# Patient Record
Sex: FEMALE | Race: WHITE | Employment: UNEMPLOYED | URBAN - NONMETROPOLITAN AREA
[De-identification: names, ages, dates, MRNs, and addresses within clinical notes are randomized per-mention and may not be internally consistent; named-entity substitution may affect disease eponyms.]

---

## 2017-03-28 ENCOUNTER — OFFICE VISIT (OUTPATIENT)
Dept: FAMILY MEDICINE CLINIC | Age: 37
End: 2017-03-28

## 2017-03-28 VITALS
WEIGHT: 211 LBS | HEIGHT: 67 IN | SYSTOLIC BLOOD PRESSURE: 154 MMHG | DIASTOLIC BLOOD PRESSURE: 108 MMHG | BODY MASS INDEX: 33.12 KG/M2 | HEART RATE: 72 BPM

## 2017-03-28 DIAGNOSIS — S39.012A LUMBAR STRAIN, INITIAL ENCOUNTER: Primary | ICD-10-CM

## 2017-03-28 PROCEDURE — 99213 OFFICE O/P EST LOW 20 MIN: CPT | Performed by: FAMILY MEDICINE

## 2017-03-28 RX ORDER — ORPHENADRINE CITRATE 100 MG/1
100 TABLET, EXTENDED RELEASE ORAL 2 TIMES DAILY
Qty: 30 TABLET | Refills: 1 | Status: SHIPPED | OUTPATIENT
Start: 2017-03-28 | End: 2018-09-13 | Stop reason: ALTCHOICE

## 2017-03-28 RX ORDER — PREDNISONE 10 MG/1
TABLET ORAL
Qty: 30 TABLET | Refills: 0 | Status: SHIPPED | OUTPATIENT
Start: 2017-03-28 | End: 2017-10-26 | Stop reason: ALTCHOICE

## 2017-03-28 ASSESSMENT — ENCOUNTER SYMPTOMS
GASTROINTESTINAL NEGATIVE: 1
RESPIRATORY NEGATIVE: 1
SHORTNESS OF BREATH: 0
BACK PAIN: 1

## 2017-10-26 ENCOUNTER — OFFICE VISIT (OUTPATIENT)
Dept: FAMILY MEDICINE CLINIC | Age: 37
End: 2017-10-26
Payer: COMMERCIAL

## 2017-10-26 ENCOUNTER — HOSPITAL ENCOUNTER (OUTPATIENT)
Age: 37
Discharge: HOME | End: 2017-10-26
Payer: COMMERCIAL

## 2017-10-26 VITALS
WEIGHT: 190.4 LBS | SYSTOLIC BLOOD PRESSURE: 172 MMHG | HEART RATE: 92 BPM | DIASTOLIC BLOOD PRESSURE: 100 MMHG | HEIGHT: 67 IN | BODY MASS INDEX: 29.88 KG/M2

## 2017-10-26 DIAGNOSIS — R63.4 WEIGHT LOSS: Primary | ICD-10-CM

## 2017-10-26 DIAGNOSIS — R53.83 FATIGUE, UNSPECIFIED TYPE: ICD-10-CM

## 2017-10-26 DIAGNOSIS — R63.4 WEIGHT LOSS: ICD-10-CM

## 2017-10-26 DIAGNOSIS — R25.1 TREMORS OF NERVOUS SYSTEM: ICD-10-CM

## 2017-10-26 LAB
ALBUMIN SERPL-MCNC: 4.8 G/DL (ref 3.5–5.1)
ALP BLD-CCNC: 104 U/L (ref 38–126)
ALT SERPL-CCNC: 49 U/L (ref 11–66)
ANION GAP SERPL CALCULATED.3IONS-SCNC: 19 MEQ/L (ref 8–16)
AST SERPL-CCNC: 44 U/L (ref 5–40)
BASOPHILS # BLD: 0.4 %
BASOPHILS ABSOLUTE: 0 THOU/MM3 (ref 0–0.1)
BILIRUB SERPL-MCNC: 0.7 MG/DL (ref 0.3–1.2)
BUN BLDV-MCNC: 8 MG/DL (ref 7–22)
CALCIUM SERPL-MCNC: 9.8 MG/DL (ref 8.5–10.5)
CHLORIDE BLD-SCNC: 96 MEQ/L (ref 98–111)
CO2: 24 MEQ/L (ref 23–33)
CREAT SERPL-MCNC: 0.7 MG/DL (ref 0.4–1.2)
EOSINOPHIL # BLD: 1 %
EOSINOPHILS ABSOLUTE: 0.1 THOU/MM3 (ref 0–0.4)
GFR SERPL CREATININE-BSD FRML MDRD: > 90 ML/MIN/1.73M2
GLUCOSE BLD-MCNC: 90 MG/DL (ref 70–108)
HCT VFR BLD CALC: 47.3 % (ref 37–47)
HEMOGLOBIN: 16.1 GM/DL (ref 12–16)
LYMPHOCYTES # BLD: 21 %
LYMPHOCYTES ABSOLUTE: 1.9 THOU/MM3 (ref 1–4.8)
MCH RBC QN AUTO: 31.6 PG (ref 27–31)
MCHC RBC AUTO-ENTMCNC: 34 GM/DL (ref 33–37)
MCV RBC AUTO: 92.8 FL (ref 81–99)
MONOCYTES # BLD: 6 %
MONOCYTES ABSOLUTE: 0.5 THOU/MM3 (ref 0.4–1.3)
NUCLEATED RED BLOOD CELLS: 0 /100 WBC
PDW BLD-RTO: 12.6 % (ref 11.5–14.5)
PLATELET # BLD: 260 THOU/MM3 (ref 130–400)
PMV BLD AUTO: 8.6 MCM (ref 7.4–10.4)
POTASSIUM SERPL-SCNC: 4.5 MEQ/L (ref 3.5–5.2)
RBC # BLD: 5.09 MILL/MM3 (ref 4.2–5.4)
SEG NEUTROPHILS: 71.6 %
SEGMENTED NEUTROPHILS ABSOLUTE COUNT: 6.4 THOU/MM3 (ref 1.8–7.7)
SODIUM BLD-SCNC: 139 MEQ/L (ref 135–145)
T3 TOTAL: 130 NG/DL (ref 72–181)
TOTAL PROTEIN: 7.4 G/DL (ref 6.1–8)
TSH SERPL DL<=0.05 MIU/L-ACNC: 3.17 UIU/ML (ref 0.4–4.2)
WBC # BLD: 9 THOU/MM3 (ref 4.8–10.8)

## 2017-10-26 PROCEDURE — 84436 ASSAY OF TOTAL THYROXINE: CPT

## 2017-10-26 PROCEDURE — 99213 OFFICE O/P EST LOW 20 MIN: CPT | Performed by: FAMILY MEDICINE

## 2017-10-26 PROCEDURE — 80050 GENERAL HEALTH PANEL: CPT

## 2017-10-26 PROCEDURE — 36415 COLL VENOUS BLD VENIPUNCTURE: CPT

## 2017-10-26 PROCEDURE — 84480 ASSAY TRIIODOTHYRONINE (T3): CPT

## 2017-10-26 RX ORDER — ASPIRIN/CALCIUM/MAG/ALUMINUM 325 MG
1 TABLET ORAL DAILY
COMMUNITY
End: 2020-12-16

## 2017-10-26 ASSESSMENT — PATIENT HEALTH QUESTIONNAIRE - PHQ9
2. FEELING DOWN, DEPRESSED OR HOPELESS: 1
SUM OF ALL RESPONSES TO PHQ9 QUESTIONS 1 & 2: 1
1. LITTLE INTEREST OR PLEASURE IN DOING THINGS: 0
SUM OF ALL RESPONSES TO PHQ QUESTIONS 1-9: 1

## 2017-10-27 ENCOUNTER — TELEPHONE (OUTPATIENT)
Dept: FAMILY MEDICINE CLINIC | Age: 37
End: 2017-10-27

## 2017-10-27 DIAGNOSIS — R25.1 TREMORS OF NERVOUS SYSTEM: ICD-10-CM

## 2017-10-27 DIAGNOSIS — R63.4 WEIGHT LOSS: Primary | ICD-10-CM

## 2017-10-27 DIAGNOSIS — R58 ECCHYMOSIS: ICD-10-CM

## 2017-10-27 LAB — THYROXINE (T4): 6.8 UG/DL (ref 4.5–12)

## 2017-10-27 NOTE — PROGRESS NOTES
Name:  Kassidy Durán  :    1980    Chief Complaint   Patient presents with    Weight Loss    Fatigue     several weeks    Menstrual Problem     irregular periods       HPI:  Weeks of multiple symptoms. Weight loss for no reason. Tremors. Mild diarrhea. Psych is good. Menses longer, but regular. No fever. Physical Exam:      BP (!) 172/100   Pulse 92   Ht 5' 7\" (1.702 m)   Wt 190 lb 6.4 oz (86.4 kg)   LMP 10/10/2017 (Within Days)   BMI 29.82 kg/m²     Not ill. Weight down. Fine tremor. Lungs are clear. Heart in RRR with no murmur. Neck with normal thyroid. Abd is soft and non tender. Assessment/Plan:      Looks like thyroid disease. Check labs. Kelsey Jones was seen today for weight loss, fatigue and menstrual problem. Diagnoses and all orders for this visit:    Weight loss  -     CBC Auto Differential; Future  -     Comprehensive Metabolic Panel; Future  -     TSH without Reflex; Future  -     T4; Future  -     T3; Future    Tremors of nervous system  -     CBC Auto Differential; Future  -     Comprehensive Metabolic Panel; Future  -     TSH without Reflex; Future  -     T4; Future  -     T3; Future    Fatigue, unspecified type  -     CBC Auto Differential; Future  -     Comprehensive Metabolic Panel; Future  -     TSH without Reflex;  Future  -     T4; Future  -     T3; Future

## 2017-10-27 NOTE — TELEPHONE ENCOUNTER
Patient notified. I will run the orders over to the [facility]. She will stop by Monday for the results of the previous labs.

## 2017-10-30 ENCOUNTER — HOSPITAL ENCOUNTER (OUTPATIENT)
Age: 37
Discharge: HOME | End: 2017-10-30
Payer: COMMERCIAL

## 2017-10-30 DIAGNOSIS — R25.1 TREMORS OF NERVOUS SYSTEM: ICD-10-CM

## 2017-10-30 DIAGNOSIS — R63.4 WEIGHT LOSS: ICD-10-CM

## 2017-10-30 DIAGNOSIS — R58 ECCHYMOSIS: ICD-10-CM

## 2017-10-30 LAB
CORTISOL COLLECTION INFO: NORMAL
CORTISOL: 6.04 UG/DL

## 2017-10-30 PROCEDURE — 82024 ASSAY OF ACTH: CPT

## 2017-10-30 PROCEDURE — 82533 TOTAL CORTISOL: CPT

## 2017-10-30 PROCEDURE — 83525 ASSAY OF INSULIN: CPT

## 2017-10-30 PROCEDURE — 36415 COLL VENOUS BLD VENIPUNCTURE: CPT

## 2017-10-30 PROCEDURE — 82384 ASSAY THREE CATECHOLAMINES: CPT

## 2017-10-31 LAB — INSULIN, RANDOM OR FASTING: NORMAL

## 2017-11-01 LAB — ACTH: 11 PG/ML (ref 6–58)

## 2017-11-03 LAB — CATECHOLAMINES FRACT FREE PLASMA: NORMAL

## 2017-11-06 ENCOUNTER — TELEPHONE (OUTPATIENT)
Dept: FAMILY MEDICINE CLINIC | Age: 37
End: 2017-11-06

## 2017-11-06 NOTE — TELEPHONE ENCOUNTER
Spoke with patient regarding the labs done  Patient wants to wait on the Toprol and will call the office back when she decides what she would like to do for the symptoms

## 2017-11-06 NOTE — TELEPHONE ENCOUNTER
----- Message from Helena Pearce MD sent at 11/4/2017  5:45 AM EDT -----  Results do not show a clear cause for how she feels.   We could try Toprol if we did not already

## 2017-11-06 NOTE — TELEPHONE ENCOUNTER
----- Message from Debby Thornton MD sent at 11/4/2017  5:45 AM EDT -----  Results do not show a clear cause for how she feels.   We could try Toprol if we did not already

## 2018-06-21 ENCOUNTER — OFFICE VISIT (OUTPATIENT)
Dept: FAMILY MEDICINE CLINIC | Age: 38
End: 2018-06-21
Payer: COMMERCIAL

## 2018-06-21 VITALS
DIASTOLIC BLOOD PRESSURE: 86 MMHG | SYSTOLIC BLOOD PRESSURE: 138 MMHG | HEART RATE: 76 BPM | BODY MASS INDEX: 27.21 KG/M2 | WEIGHT: 173.4 LBS | HEIGHT: 67 IN

## 2018-06-21 DIAGNOSIS — K29.50 CHRONIC GASTRITIS WITHOUT BLEEDING, UNSPECIFIED GASTRITIS TYPE: ICD-10-CM

## 2018-06-21 DIAGNOSIS — I10 ESSENTIAL HYPERTENSION: Primary | Chronic | ICD-10-CM

## 2018-06-21 PROCEDURE — 99213 OFFICE O/P EST LOW 20 MIN: CPT | Performed by: FAMILY MEDICINE

## 2018-06-21 RX ORDER — LISINOPRIL 5 MG/1
5 TABLET ORAL DAILY
COMMUNITY
End: 2018-06-21 | Stop reason: SDUPTHER

## 2018-06-21 RX ORDER — PANTOPRAZOLE SODIUM 40 MG/1
40 TABLET, DELAYED RELEASE ORAL DAILY
Qty: 30 TABLET | Refills: 3 | Status: SHIPPED | OUTPATIENT
Start: 2018-06-21 | End: 2018-06-26 | Stop reason: SINTOL

## 2018-06-21 RX ORDER — LISINOPRIL 5 MG/1
5 TABLET ORAL DAILY
Qty: 90 TABLET | Refills: 1 | Status: SHIPPED | OUTPATIENT
Start: 2018-06-21 | End: 2018-09-13 | Stop reason: SDUPTHER

## 2018-06-22 ASSESSMENT — ENCOUNTER SYMPTOMS
ORTHOPNEA: 0
GASTROINTESTINAL NEGATIVE: 1
RESPIRATORY NEGATIVE: 1
SHORTNESS OF BREATH: 0

## 2018-06-26 ENCOUNTER — TELEPHONE (OUTPATIENT)
Dept: FAMILY MEDICINE CLINIC | Age: 38
End: 2018-06-26

## 2018-07-31 ENCOUNTER — TELEPHONE (OUTPATIENT)
Dept: FAMILY MEDICINE CLINIC | Age: 38
End: 2018-07-31

## 2018-07-31 NOTE — TELEPHONE ENCOUNTER
Patient states [facility] doesn't have her generic prevacid solutab that she has been rx'd. She is willing to go to another pharmacy but she is unaware who else would have. She is willing to go to [facility] or any [place] pharmacy. Please advise patient when complete. Thanks!

## 2018-07-31 NOTE — TELEPHONE ENCOUNTER
Spoke with the patient regarding the patient needing to check the pharmacy to see who has the Prevacid in stock   Patient will call back and let us know

## 2018-09-13 ENCOUNTER — OFFICE VISIT (OUTPATIENT)
Dept: FAMILY MEDICINE CLINIC | Age: 38
End: 2018-09-13
Payer: COMMERCIAL

## 2018-09-13 VITALS
DIASTOLIC BLOOD PRESSURE: 62 MMHG | HEART RATE: 92 BPM | WEIGHT: 172 LBS | SYSTOLIC BLOOD PRESSURE: 122 MMHG | BODY MASS INDEX: 27 KG/M2 | HEIGHT: 67 IN

## 2018-09-13 DIAGNOSIS — I10 ESSENTIAL HYPERTENSION: Chronic | ICD-10-CM

## 2018-09-13 PROCEDURE — 99213 OFFICE O/P EST LOW 20 MIN: CPT | Performed by: FAMILY MEDICINE

## 2018-09-13 RX ORDER — DESVENLAFAXINE 50 MG/1
50 TABLET, EXTENDED RELEASE ORAL DAILY
Qty: 90 TABLET | Refills: 0 | Status: SHIPPED | OUTPATIENT
Start: 2018-09-13 | End: 2018-12-13 | Stop reason: SDUPTHER

## 2018-09-13 RX ORDER — LISINOPRIL 5 MG/1
5 TABLET ORAL DAILY
Qty: 90 TABLET | Refills: 1 | Status: SHIPPED | OUTPATIENT
Start: 2018-09-13 | End: 2019-03-21 | Stop reason: SDUPTHER

## 2018-09-13 RX ORDER — FLUOXETINE HYDROCHLORIDE 20 MG/1
20 CAPSULE ORAL DAILY
Qty: 90 CAPSULE | Refills: 0 | Status: SHIPPED | OUTPATIENT
Start: 2018-09-13 | End: 2018-12-13 | Stop reason: SDUPTHER

## 2018-09-13 ASSESSMENT — PATIENT HEALTH QUESTIONNAIRE - PHQ9
SUM OF ALL RESPONSES TO PHQ QUESTIONS 1-9: 0
SUM OF ALL RESPONSES TO PHQ9 QUESTIONS 1 & 2: 0
1. LITTLE INTEREST OR PLEASURE IN DOING THINGS: 0
2. FEELING DOWN, DEPRESSED OR HOPELESS: 0
SUM OF ALL RESPONSES TO PHQ QUESTIONS 1-9: 0

## 2018-09-13 ASSESSMENT — ENCOUNTER SYMPTOMS
RESPIRATORY NEGATIVE: 1
ORTHOPNEA: 0
GASTROINTESTINAL NEGATIVE: 1
SHORTNESS OF BREATH: 0

## 2018-12-13 RX ORDER — FLUOXETINE HYDROCHLORIDE 20 MG/1
CAPSULE ORAL
Qty: 90 CAPSULE | Refills: 0 | Status: SHIPPED | OUTPATIENT
Start: 2018-12-13 | End: 2019-03-12 | Stop reason: SDUPTHER

## 2018-12-13 RX ORDER — DESVENLAFAXINE 50 MG/1
TABLET, EXTENDED RELEASE ORAL
Qty: 90 TABLET | Refills: 0 | Status: SHIPPED | OUTPATIENT
Start: 2018-12-13 | End: 2019-03-12 | Stop reason: SDUPTHER

## 2019-03-12 RX ORDER — FLUOXETINE HYDROCHLORIDE 20 MG/1
CAPSULE ORAL
Qty: 90 CAPSULE | Refills: 0 | Status: SHIPPED | OUTPATIENT
Start: 2019-03-12 | End: 2019-03-21 | Stop reason: SDUPTHER

## 2019-03-12 RX ORDER — DESVENLAFAXINE 50 MG/1
TABLET, EXTENDED RELEASE ORAL
Qty: 90 TABLET | Refills: 0 | Status: SHIPPED | OUTPATIENT
Start: 2019-03-12 | End: 2019-03-21 | Stop reason: SDUPTHER

## 2019-03-21 ENCOUNTER — OFFICE VISIT (OUTPATIENT)
Dept: FAMILY MEDICINE CLINIC | Age: 39
End: 2019-03-21
Payer: COMMERCIAL

## 2019-03-21 VITALS
SYSTOLIC BLOOD PRESSURE: 124 MMHG | WEIGHT: 182.8 LBS | DIASTOLIC BLOOD PRESSURE: 76 MMHG | HEART RATE: 84 BPM | HEIGHT: 67 IN | BODY MASS INDEX: 28.69 KG/M2

## 2019-03-21 DIAGNOSIS — Z00.00 WELL ADULT HEALTH CHECK: Primary | ICD-10-CM

## 2019-03-21 DIAGNOSIS — I10 ESSENTIAL HYPERTENSION: Chronic | ICD-10-CM

## 2019-03-21 PROCEDURE — 99213 OFFICE O/P EST LOW 20 MIN: CPT | Performed by: FAMILY MEDICINE

## 2019-03-21 RX ORDER — LISINOPRIL 5 MG/1
5 TABLET ORAL DAILY
Qty: 90 TABLET | Refills: 1 | Status: SHIPPED | OUTPATIENT
Start: 2019-03-21 | End: 2019-09-20 | Stop reason: SDUPTHER

## 2019-03-21 RX ORDER — DESVENLAFAXINE 50 MG/1
50 TABLET, EXTENDED RELEASE ORAL DAILY
Qty: 90 TABLET | Refills: 1 | Status: SHIPPED | OUTPATIENT
Start: 2019-03-21 | End: 2019-09-20 | Stop reason: SDUPTHER

## 2019-03-21 RX ORDER — FLUOXETINE HYDROCHLORIDE 20 MG/1
20 CAPSULE ORAL DAILY
Qty: 90 CAPSULE | Refills: 1 | Status: SHIPPED | OUTPATIENT
Start: 2019-03-21 | End: 2019-09-20 | Stop reason: SDUPTHER

## 2019-03-21 ASSESSMENT — ENCOUNTER SYMPTOMS
GASTROINTESTINAL NEGATIVE: 1
ORTHOPNEA: 0
RESPIRATORY NEGATIVE: 1

## 2019-03-27 ENCOUNTER — TELEPHONE (OUTPATIENT)
Dept: FAMILY MEDICINE CLINIC | Age: 39
End: 2019-03-27

## 2019-03-27 DIAGNOSIS — F43.0 ACUTE REACTION TO SITUATIONAL STRESS: Primary | ICD-10-CM

## 2019-03-28 RX ORDER — ALPRAZOLAM 0.5 MG/1
0.5 TABLET ORAL 3 TIMES DAILY PRN
Qty: 20 TABLET | Refills: 0 | Status: SHIPPED | OUTPATIENT
Start: 2019-03-28 | End: 2019-07-23 | Stop reason: SDUPTHER

## 2019-05-20 ENCOUNTER — OFFICE VISIT (OUTPATIENT)
Dept: FAMILY MEDICINE CLINIC | Age: 39
End: 2019-05-20
Payer: COMMERCIAL

## 2019-05-20 ENCOUNTER — HOSPITAL ENCOUNTER (OUTPATIENT)
Dept: GENERAL RADIOLOGY | Age: 39
Discharge: HOME | End: 2019-05-20
Payer: COMMERCIAL

## 2019-05-20 VITALS
HEIGHT: 67 IN | SYSTOLIC BLOOD PRESSURE: 144 MMHG | DIASTOLIC BLOOD PRESSURE: 74 MMHG | HEART RATE: 80 BPM | WEIGHT: 186.6 LBS | BODY MASS INDEX: 29.29 KG/M2

## 2019-05-20 DIAGNOSIS — R55 SYNCOPE, UNSPECIFIED SYNCOPE TYPE: ICD-10-CM

## 2019-05-20 DIAGNOSIS — R55 SYNCOPE, UNSPECIFIED SYNCOPE TYPE: Primary | ICD-10-CM

## 2019-05-20 PROCEDURE — 93226 XTRNL ECG REC<48 HR SCAN A/R: CPT

## 2019-05-20 PROCEDURE — 99213 OFFICE O/P EST LOW 20 MIN: CPT | Performed by: FAMILY MEDICINE

## 2019-05-20 RX ORDER — HYDROXYZINE PAMOATE 25 MG/1
25 CAPSULE ORAL 3 TIMES DAILY PRN
COMMUNITY
End: 2019-05-20 | Stop reason: SDUPTHER

## 2019-05-20 RX ORDER — HYDROXYZINE PAMOATE 25 MG/1
25 CAPSULE ORAL 3 TIMES DAILY PRN
Qty: 100 CAPSULE | Refills: 3 | Status: SHIPPED | OUTPATIENT
Start: 2019-05-20 | End: 2019-09-16 | Stop reason: CLARIF

## 2019-05-20 RX ORDER — CEPHALEXIN 500 MG/1
1 CAPSULE ORAL EVERY 6 HOURS
Refills: 0 | COMMUNITY
Start: 2019-05-11 | End: 2019-07-01 | Stop reason: ALTCHOICE

## 2019-05-20 RX ORDER — IBUPROFEN 600 MG/1
600 TABLET ORAL
COMMUNITY
Start: 2019-01-30 | End: 2019-09-03 | Stop reason: ALTCHOICE

## 2019-05-25 LAB
ACQUISITION DURATION: NORMAL S
AVERAGE HEART RATE: 89 BPM
HOOKUP DATE: NORMAL
HOOKUP TIME: NORMAL
MAX HEART RATE TIME/DATE: NORMAL
MAX HEART RATE: 146 BPM
MIN HEART RATE TIME/DATE: NORMAL
MIN HEART RATE: 64 BPM
NUMBER OF QRS COMPLEXES: NORMAL
NUMBER OF SUPRAVENTRICULAR BEATS IN RUNS: 0
NUMBER OF SUPRAVENTRICULAR COUPLETS: 0
NUMBER OF SUPRAVENTRICULAR ECTOPICS: 0
NUMBER OF SUPRAVENTRICULAR ISOLATED BEATS: 0
NUMBER OF SUPRAVENTRICULAR RUNS: 0
NUMBER OF VENTRICULAR BEATS IN RUNS: 0
NUMBER OF VENTRICULAR BIGEMINAL CYCLES: 0
NUMBER OF VENTRICULAR COUPLETS: 0
NUMBER OF VENTRICULAR ECTOPICS: 3
NUMBER OF VENTRICULAR ISOLATED BEATS: 3
NUMBER OF VENTRICULAR RUNS: 0

## 2019-05-28 ENCOUNTER — TELEPHONE (OUTPATIENT)
Dept: FAMILY MEDICINE CLINIC | Age: 39
End: 2019-05-28

## 2019-06-10 DIAGNOSIS — R55 SYNCOPE, UNSPECIFIED SYNCOPE TYPE: ICD-10-CM

## 2019-06-13 ENCOUNTER — TELEPHONE (OUTPATIENT)
Dept: FAMILY MEDICINE CLINIC | Age: 39
End: 2019-06-13

## 2019-06-13 NOTE — TELEPHONE ENCOUNTER
Patient called in for the EEG results (done at [facility]). These were given and  Now patient wants to know what next. She says no more syncopal episodes. We are to call her back.

## 2019-07-01 ENCOUNTER — OFFICE VISIT (OUTPATIENT)
Dept: FAMILY MEDICINE CLINIC | Age: 39
End: 2019-07-01
Payer: COMMERCIAL

## 2019-07-01 VITALS
HEART RATE: 84 BPM | DIASTOLIC BLOOD PRESSURE: 86 MMHG | BODY MASS INDEX: 29.35 KG/M2 | SYSTOLIC BLOOD PRESSURE: 134 MMHG | HEIGHT: 67 IN | WEIGHT: 187 LBS

## 2019-07-01 DIAGNOSIS — R55 NEAR SYNCOPE: Primary | ICD-10-CM

## 2019-07-01 PROCEDURE — 99213 OFFICE O/P EST LOW 20 MIN: CPT | Performed by: FAMILY MEDICINE

## 2019-07-05 ENCOUNTER — HOSPITAL ENCOUNTER (OUTPATIENT)
Dept: NON INVASIVE DIAGNOSTICS | Age: 39
Discharge: HOME | End: 2019-07-05
Payer: COMMERCIAL

## 2019-07-05 DIAGNOSIS — R55 NEAR SYNCOPE: ICD-10-CM

## 2019-07-05 LAB
LV EF: 63 %
LVEF MODALITY: NORMAL

## 2019-07-05 PROCEDURE — 93306 TTE W/DOPPLER COMPLETE: CPT

## 2019-07-08 ENCOUNTER — TELEPHONE (OUTPATIENT)
Dept: FAMILY MEDICINE CLINIC | Age: 39
End: 2019-07-08

## 2019-07-11 ENCOUNTER — HOSPITAL ENCOUNTER (OUTPATIENT)
Age: 39
Discharge: HOME | End: 2019-07-11
Payer: COMMERCIAL

## 2019-07-11 DIAGNOSIS — R55 NEAR SYNCOPE: ICD-10-CM

## 2019-07-11 DIAGNOSIS — Z00.00 WELL ADULT HEALTH CHECK: ICD-10-CM

## 2019-07-11 LAB
ALBUMIN SERPL-MCNC: 4.3 G/DL (ref 3.5–5.1)
ALP BLD-CCNC: 77 U/L (ref 38–126)
ALT SERPL-CCNC: 20 U/L (ref 11–66)
ANION GAP SERPL CALCULATED.3IONS-SCNC: 16 MEQ/L (ref 8–16)
AST SERPL-CCNC: 30 U/L (ref 5–40)
BILIRUB SERPL-MCNC: 0.2 MG/DL (ref 0.3–1.2)
BUN BLDV-MCNC: 9 MG/DL (ref 7–22)
CALCIUM SERPL-MCNC: 9 MG/DL (ref 8.5–10.5)
CHLORIDE BLD-SCNC: 100 MEQ/L (ref 98–111)
CHOLESTEROL, TOTAL: 229 MG/DL (ref 100–199)
CO2: 22 MEQ/L (ref 23–33)
CREAT SERPL-MCNC: 0.5 MG/DL (ref 0.4–1.2)
GFR SERPL CREATININE-BSD FRML MDRD: > 90 ML/MIN/1.73M2
GLUCOSE BLD-MCNC: 80 MG/DL (ref 70–108)
HDLC SERPL-MCNC: 52 MG/DL
LDL CHOLESTEROL CALCULATED: ABNORMAL MG/DL
POTASSIUM SERPL-SCNC: 4.1 MEQ/L (ref 3.5–5.2)
SODIUM BLD-SCNC: 138 MEQ/L (ref 135–145)
TOTAL PROTEIN: 6.6 G/DL (ref 6.1–8)
TRIGL SERPL-MCNC: 731 MG/DL (ref 0–199)
TSH SERPL DL<=0.05 MIU/L-ACNC: 2.37 UIU/ML (ref 0.4–4.2)

## 2019-07-11 PROCEDURE — 80053 COMPREHEN METABOLIC PANEL: CPT

## 2019-07-11 PROCEDURE — 36415 COLL VENOUS BLD VENIPUNCTURE: CPT

## 2019-07-11 PROCEDURE — 84443 ASSAY THYROID STIM HORMONE: CPT

## 2019-07-11 PROCEDURE — 80061 LIPID PANEL: CPT

## 2019-07-15 ENCOUNTER — TELEPHONE (OUTPATIENT)
Dept: FAMILY MEDICINE CLINIC | Age: 39
End: 2019-07-15

## 2019-07-23 ENCOUNTER — TELEPHONE (OUTPATIENT)
Dept: FAMILY MEDICINE CLINIC | Age: 39
End: 2019-07-23

## 2019-07-23 DIAGNOSIS — F43.0 ACUTE REACTION TO SITUATIONAL STRESS: ICD-10-CM

## 2019-07-23 RX ORDER — ALPRAZOLAM 0.5 MG/1
0.5 TABLET ORAL 3 TIMES DAILY PRN
Qty: 20 TABLET | Refills: 0 | Status: SHIPPED | OUTPATIENT
Start: 2019-07-23 | End: 2019-10-31 | Stop reason: SDUPTHER

## 2019-08-27 LAB
ALBUMIN SERPL-MCNC: NORMAL G/DL
ALP BLD-CCNC: NORMAL U/L
ALT SERPL-CCNC: NORMAL U/L
ANION GAP SERPL CALCULATED.3IONS-SCNC: NORMAL MMOL/L
AST SERPL-CCNC: NORMAL U/L
BASOPHILS ABSOLUTE: NORMAL /ΜL
BASOPHILS RELATIVE PERCENT: NORMAL %
BILIRUB SERPL-MCNC: NORMAL MG/DL (ref 0.1–1.4)
BUN BLDV-MCNC: NORMAL MG/DL
CALCIUM SERPL-MCNC: NORMAL MG/DL
CHLORIDE BLD-SCNC: NORMAL MMOL/L
CO2: NORMAL MMOL/L
CREAT SERPL-MCNC: NORMAL MG/DL
EOSINOPHILS ABSOLUTE: NORMAL /ΜL
EOSINOPHILS RELATIVE PERCENT: NORMAL %
GFR CALCULATED: NORMAL
GLUCOSE BLD-MCNC: NORMAL MG/DL
HCT VFR BLD CALC: NORMAL % (ref 36–46)
HEMOGLOBIN: NORMAL G/DL (ref 12–16)
LYMPHOCYTES ABSOLUTE: NORMAL /ΜL
LYMPHOCYTES RELATIVE PERCENT: NORMAL %
MCH RBC QN AUTO: NORMAL PG
MCHC RBC AUTO-ENTMCNC: NORMAL G/DL
MCV RBC AUTO: NORMAL FL
MONOCYTES ABSOLUTE: NORMAL /ΜL
MONOCYTES RELATIVE PERCENT: NORMAL %
NEUTROPHILS ABSOLUTE: NORMAL /ΜL
NEUTROPHILS RELATIVE PERCENT: NORMAL %
PDW BLD-RTO: NORMAL %
PLATELET # BLD: NORMAL K/ΜL
PMV BLD AUTO: NORMAL FL
POTASSIUM SERPL-SCNC: NORMAL MMOL/L
RBC # BLD: NORMAL 10^6/ΜL
SODIUM BLD-SCNC: NORMAL MMOL/L
TOTAL PROTEIN: NORMAL
WBC # BLD: NORMAL 10^3/ML

## 2019-09-03 ENCOUNTER — OFFICE VISIT (OUTPATIENT)
Dept: FAMILY MEDICINE CLINIC | Age: 39
End: 2019-09-03
Payer: COMMERCIAL

## 2019-09-03 VITALS
BODY MASS INDEX: 29.35 KG/M2 | SYSTOLIC BLOOD PRESSURE: 122 MMHG | HEIGHT: 67 IN | HEART RATE: 88 BPM | WEIGHT: 187 LBS | DIASTOLIC BLOOD PRESSURE: 82 MMHG

## 2019-09-03 DIAGNOSIS — R74.8 ELEVATED LIVER ENZYMES: Primary | ICD-10-CM

## 2019-09-03 PROCEDURE — 99213 OFFICE O/P EST LOW 20 MIN: CPT | Performed by: FAMILY MEDICINE

## 2019-09-04 NOTE — PROGRESS NOTES
Name:  Radha Kincaid  :    1980    Chief Complaint   Patient presents with    Other     [facility] ER 19-go over test results       HPI:  [facility] was to [facility] [place] [facility] after feeling very weak and dizzy. Sweats and chills. She admits to drinking about a 1/2 gal of 190 proof alcohol daily. She has not tried to quit and is not sure about withdrawal.  Treated for dehydration after lab testing done. Has bee well since. Physical Exam:      /82 (Site: Left Upper Arm, Position: Sitting, Cuff Size: Medium Adult)   Pulse 88   Ht 5' 7\" (1.702 m)   Wt 187 lb (84.8 kg)   BMI 29.29 kg/m²     Not ill. Review of testing shows very mild LFT elevation at about 2-3 times normal.  Psych is good without depression or anxiety. Neuro normal.  Neck normal.   Lungs are clear. Heart in RRR with no murmur. No edema. Assessment/Plan:      Elevated LFT  Alcoholism    We will repeat labs and also get a BAL. Ar[place]us Ring We talked about quitting drinking and she is not interested. Worried about withdrawal.  Strong FHx of Alcoholism including death related. [facility] was seen today for other. Diagnoses and all orders for this visit:    Elevated liver enzymes  -     Lipid Panel; Future  -     Hepatic Function Panel; Future  -     Ethanol;  Future

## 2019-09-16 DIAGNOSIS — F41.9 ANXIETY: Primary | ICD-10-CM

## 2019-09-16 RX ORDER — HYDROXYZINE HYDROCHLORIDE 25 MG/1
25 TABLET, FILM COATED ORAL 3 TIMES DAILY PRN
COMMUNITY
End: 2019-09-16 | Stop reason: SDUPTHER

## 2019-09-16 RX ORDER — HYDROXYZINE HYDROCHLORIDE 25 MG/1
25 TABLET, FILM COATED ORAL 3 TIMES DAILY PRN
Qty: 100 TABLET | Refills: 3 | Status: SHIPPED | OUTPATIENT
Start: 2019-09-16 | End: 2019-10-31 | Stop reason: SDUPTHER

## 2019-09-16 RX ORDER — HYDROXYZINE HYDROCHLORIDE 25 MG/1
25 TABLET, FILM COATED ORAL 3 TIMES DAILY PRN
Qty: 42 TABLET | Refills: 0 | Status: SHIPPED | OUTPATIENT
Start: 2019-09-16 | End: 2019-09-20 | Stop reason: ALTCHOICE

## 2019-09-17 ENCOUNTER — TELEPHONE (OUTPATIENT)
Dept: FAMILY MEDICINE CLINIC | Age: 39
End: 2019-09-17

## 2019-09-17 ENCOUNTER — HOSPITAL ENCOUNTER (OUTPATIENT)
Age: 39
Discharge: HOME | End: 2019-09-17
Payer: COMMERCIAL

## 2019-09-17 DIAGNOSIS — R74.8 ELEVATED LIVER ENZYMES: ICD-10-CM

## 2019-09-17 LAB
ALBUMIN SERPL-MCNC: 4.6 G/DL (ref 3.5–5.1)
ALP BLD-CCNC: 93 U/L (ref 38–126)
ALT SERPL-CCNC: 43 U/L (ref 11–66)
AST SERPL-CCNC: 54 U/L (ref 5–40)
BILIRUB SERPL-MCNC: 0.5 MG/DL (ref 0.3–1.2)
BILIRUBIN DIRECT: < 0.2 MG/DL (ref 0–0.3)
CHOLESTEROL, TOTAL: 209 MG/DL (ref 100–199)
ETHYL ALCOHOL, SERUM: < 0.01 %
HDLC SERPL-MCNC: 65 MG/DL
LDL CHOLESTEROL CALCULATED: 91 MG/DL
TOTAL PROTEIN: 7.1 G/DL (ref 6.1–8)
TRIGL SERPL-MCNC: 267 MG/DL (ref 0–199)

## 2019-09-17 PROCEDURE — 36415 COLL VENOUS BLD VENIPUNCTURE: CPT

## 2019-09-17 PROCEDURE — G0480 DRUG TEST DEF 1-7 CLASSES: HCPCS

## 2019-09-17 PROCEDURE — 80076 HEPATIC FUNCTION PANEL: CPT

## 2019-09-17 PROCEDURE — 80061 LIPID PANEL: CPT

## 2019-09-17 NOTE — TELEPHONE ENCOUNTER
From yesterday's note:  Second: She states that she has been having a Menstrual spotting on and off since August 24th. She is having labs done for you on Friday and wonders if she should have other labs drawn with this. .    We are to call her back      I think she had her labs drawn already today, so I guess this is not needed.

## 2019-09-18 ENCOUNTER — TELEPHONE (OUTPATIENT)
Dept: FAMILY MEDICINE CLINIC | Age: 39
End: 2019-09-18

## 2019-09-18 NOTE — TELEPHONE ENCOUNTER
----- Message from Edson Sheriff MD sent at 9/18/2019  6:09 AM EDT -----  Alcohol level is zero. Liver testing almost normal now.

## 2019-09-20 ENCOUNTER — OFFICE VISIT (OUTPATIENT)
Dept: FAMILY MEDICINE CLINIC | Age: 39
End: 2019-09-20
Payer: COMMERCIAL

## 2019-09-20 VITALS
WEIGHT: 189.2 LBS | DIASTOLIC BLOOD PRESSURE: 78 MMHG | HEIGHT: 67 IN | BODY MASS INDEX: 29.7 KG/M2 | SYSTOLIC BLOOD PRESSURE: 122 MMHG | HEART RATE: 80 BPM

## 2019-09-20 DIAGNOSIS — I10 ESSENTIAL HYPERTENSION: Primary | Chronic | ICD-10-CM

## 2019-09-20 DIAGNOSIS — N92.0 MENORRHAGIA WITH REGULAR CYCLE: ICD-10-CM

## 2019-09-20 PROCEDURE — 99213 OFFICE O/P EST LOW 20 MIN: CPT | Performed by: FAMILY MEDICINE

## 2019-09-20 RX ORDER — LISINOPRIL 5 MG/1
5 TABLET ORAL DAILY
Qty: 90 TABLET | Refills: 1 | Status: SHIPPED | OUTPATIENT
Start: 2019-09-20 | End: 2019-10-31 | Stop reason: DRUGHIGH

## 2019-09-20 RX ORDER — MEDROXYPROGESTERONE ACETATE 10 MG/1
10 TABLET ORAL DAILY
Qty: 10 TABLET | Refills: 0 | Status: SHIPPED | OUTPATIENT
Start: 2019-09-20 | End: 2019-10-01 | Stop reason: ALTCHOICE

## 2019-09-20 RX ORDER — DESVENLAFAXINE 50 MG/1
50 TABLET, EXTENDED RELEASE ORAL DAILY
Qty: 90 TABLET | Refills: 1 | Status: SHIPPED | OUTPATIENT
Start: 2019-09-20 | End: 2020-02-10 | Stop reason: SDUPTHER

## 2019-09-20 RX ORDER — FLUOXETINE HYDROCHLORIDE 20 MG/1
20 CAPSULE ORAL DAILY
Qty: 90 CAPSULE | Refills: 1 | Status: SHIPPED | OUTPATIENT
Start: 2019-09-20 | End: 2020-03-17 | Stop reason: SDUPTHER

## 2019-09-20 ASSESSMENT — ENCOUNTER SYMPTOMS
ORTHOPNEA: 0
GASTROINTESTINAL NEGATIVE: 1
RESPIRATORY NEGATIVE: 1

## 2019-09-20 NOTE — PROGRESS NOTES
[facility]ESSION[facility]  [facility]  [address] [facility]  Dept: [phone]  Dept Fax: [phone]: [phone]    Visit Date: 9/20/2019    Kostas Maldonado is a 44 y. o.female who presents today for:   Chief Complaint   Patient presents with    6 Month Follow-Up     Recent labs    HPI:      Annalisa bray her labs done after not drinking for over 12 hours. No withdrawal.  ETOH was zero. Liver enzymes and triglycerides better. Psych meds have been good. This menstrual cycle has been very heavy and lasting long. UCG was negative at ER visit. No dizziness or weakness. Hypertension   This is a chronic problem. The current episode started more than 1 year ago. The problem has been resolved since onset. The problem is controlled. Pertinent negatives include no chest pain, orthopnea or palpitations. Risk factors for coronary artery disease include family history and stress. Past treatments include ACE inhibitors. The current treatment provides significant improvement. Compliance problems include psychosocial issues. There is no history of kidney disease, CAD/MI or CVA. There is no history of chronic renal disease or a thyroid problem.        Current Outpatient Medications   Medication Sig Dispense Refill    medroxyPROGESTERone (PROVERA) 10 MG tablet Take 1 tablet by mouth daily for 10 days 10 tablet 0    lansoprazole (PREVACID SOLUTAB) 30 MG disintegrating tablet Take 1 tablet by mouth daily 90 tablet 1    desvenlafaxine succinate (PRISTIQ) 50 MG TB24 extended release tablet Take 1 tablet by mouth daily 90 tablet 1    FLUoxetine (PROZAC) 20 MG capsule Take 1 capsule by mouth daily 90 capsule 1    lisinopril (PRINIVIL;ZESTRIL) 5 MG tablet Take 1 tablet by mouth daily 90 tablet 1    hydrOXYzine (ATARAX) 25 MG tablet Take 1 tablet by mouth 3 times daily as needed for Anxiety 100 tablet 3    Cetirizine HCl (ZYRTEC PO) Take 1 tablet by mouth

## 2019-09-30 ENCOUNTER — TELEPHONE (OUTPATIENT)
Dept: FAMILY MEDICINE CLINIC | Age: 39
End: 2019-09-30

## 2019-09-30 RX ORDER — NORGESTIMATE AND ETHINYL ESTRADIOL 0.25-0.035
KIT ORAL
Qty: 1 PACKET | Refills: 0 | Status: CANCELLED | OUTPATIENT
Start: 2019-09-30

## 2019-09-30 NOTE — TELEPHONE ENCOUNTER
We could try stopping the Provera and use Birth   Control pill and take 2 a day. Pended.    Pharmacy pending

## 2019-09-30 NOTE — TELEPHONE ENCOUNTER
Spoke with Glenda Shi- she is currently in the ER and states that they are going to refer her to GYN.

## 2019-10-01 ENCOUNTER — TELEPHONE (OUTPATIENT)
Dept: FAMILY MEDICINE CLINIC | Age: 39
End: 2019-10-01

## 2019-10-01 RX ORDER — NORGESTIMATE AND ETHINYL ESTRADIOL 0.25-0.035
1 KIT ORAL 2 TIMES DAILY
Qty: 1 PACKET | Refills: 0 | Status: SHIPPED | OUTPATIENT
Start: 2019-10-01 | End: 2020-12-16

## 2019-10-31 ENCOUNTER — OFFICE VISIT (OUTPATIENT)
Dept: FAMILY MEDICINE CLINIC | Age: 39
End: 2019-10-31
Payer: COMMERCIAL

## 2019-10-31 VITALS — SYSTOLIC BLOOD PRESSURE: 154 MMHG | WEIGHT: 188.2 LBS | DIASTOLIC BLOOD PRESSURE: 90 MMHG | BODY MASS INDEX: 29.48 KG/M2

## 2019-10-31 DIAGNOSIS — F43.0 ACUTE REACTION TO SITUATIONAL STRESS: ICD-10-CM

## 2019-10-31 DIAGNOSIS — I10 ESSENTIAL HYPERTENSION: Primary | ICD-10-CM

## 2019-10-31 DIAGNOSIS — F41.9 ANXIETY: ICD-10-CM

## 2019-10-31 PROCEDURE — 99213 OFFICE O/P EST LOW 20 MIN: CPT | Performed by: FAMILY MEDICINE

## 2019-10-31 RX ORDER — LISINOPRIL AND HYDROCHLOROTHIAZIDE 12.5; 1 MG/1; MG/1
1 TABLET ORAL DAILY
Qty: 90 TABLET | Refills: 1 | Status: SHIPPED | OUTPATIENT
Start: 2019-10-31 | End: 2020-02-25 | Stop reason: SDUPTHER

## 2019-10-31 RX ORDER — ALPRAZOLAM 0.5 MG/1
1 TABLET ORAL 3 TIMES DAILY PRN
COMMUNITY
Start: 2019-07-23 | End: 2020-10-16 | Stop reason: SDUPTHER

## 2019-10-31 RX ORDER — HYDROXYZINE HYDROCHLORIDE 25 MG/1
25 TABLET, FILM COATED ORAL 3 TIMES DAILY PRN
Qty: 100 TABLET | Refills: 3 | Status: SHIPPED | OUTPATIENT
Start: 2019-10-31 | End: 2020-02-04 | Stop reason: SDUPTHER

## 2019-10-31 RX ORDER — ALPRAZOLAM 0.5 MG/1
0.5 TABLET ORAL 3 TIMES DAILY PRN
Qty: 20 TABLET | Refills: 0 | Status: SHIPPED | OUTPATIENT
Start: 2019-10-31 | End: 2020-02-04 | Stop reason: SDUPTHER

## 2019-11-05 ENCOUNTER — TELEPHONE (OUTPATIENT)
Dept: FAMILY MEDICINE CLINIC | Age: 39
End: 2019-11-05

## 2020-02-04 ENCOUNTER — OFFICE VISIT (OUTPATIENT)
Dept: FAMILY MEDICINE CLINIC | Age: 40
End: 2020-02-04
Payer: COMMERCIAL

## 2020-02-04 VITALS
HEIGHT: 67 IN | DIASTOLIC BLOOD PRESSURE: 82 MMHG | WEIGHT: 190.4 LBS | HEART RATE: 82 BPM | SYSTOLIC BLOOD PRESSURE: 126 MMHG | BODY MASS INDEX: 29.88 KG/M2

## 2020-02-04 PROCEDURE — 99213 OFFICE O/P EST LOW 20 MIN: CPT | Performed by: FAMILY MEDICINE

## 2020-02-04 RX ORDER — HYDROXYZINE HYDROCHLORIDE 25 MG/1
25 TABLET, FILM COATED ORAL 4 TIMES DAILY PRN
Qty: 360 TABLET | Refills: 3 | Status: SHIPPED | OUTPATIENT
Start: 2020-02-04 | End: 2021-03-17 | Stop reason: SDUPTHER

## 2020-02-04 RX ORDER — ALPRAZOLAM 0.5 MG/1
0.5 TABLET ORAL 3 TIMES DAILY PRN
Qty: 20 TABLET | Refills: 0 | Status: SHIPPED | OUTPATIENT
Start: 2020-02-04 | End: 2020-03-05

## 2020-02-05 NOTE — PROGRESS NOTES
Name:  Leona German  :    1980    Chief Complaint   Patient presents with    Anxiety    Depression       HPI:  Isidro Velazquez continues to struggle with psych issues. Drinking 4 shots of 190 proof liquor nightly. No clear withdrawal.  More anxiety and panic. Not leaving house much. Previously unhappy with psychiatric care. More periods of depression and crying. No manic episodes. Very anxious about any change of meds. Physical Exam:      /82 (Site: Left Upper Arm, Position: Sitting, Cuff Size: Large Adult)   Pulse 82   Ht 5' 7\" (1.702 m)   Wt 190 lb 6.4 oz (86.4 kg)   BMI 29.82 kg/m²     Tearful. Interactive and logical.  Thyroid is normal.   Lungs are clear. Heart in RRR with no murmur. Tremors as usual.  Ambulatory. Assessment/Plan:      Unclear if some issue may be withdrawal.  Depression and anxiety and panic. We ill increase Pristiq to 100mg  Refill rescue Xanax with education for use. Isidro Velazquez was seen today for anxiety and depression. Diagnoses and all orders for this visit:    Acute reaction to situational stress  -     ALPRAZolam (XANAX) 0.5 MG tablet; Take 1 tablet by mouth 3 times daily as needed for Sleep or Anxiety for up to 30 days. Anxiety  -     hydrOXYzine (ATARAX) 25 MG tablet;  Take 1 tablet by mouth 4 times daily as needed for Anxiety

## 2020-02-10 RX ORDER — DESVENLAFAXINE 100 MG/1
100 TABLET, EXTENDED RELEASE ORAL DAILY
Qty: 90 TABLET | Refills: 3 | Status: SHIPPED | OUTPATIENT
Start: 2020-02-10 | End: 2020-10-16 | Stop reason: SDUPTHER

## 2020-02-10 NOTE — TELEPHONE ENCOUNTER
Rachid Barrios called and says she only has 6 day worth of Pristiq 50 mg ER since it was upped to 100mg. She says it is helping some what. . [facility]     Pili Harrison called requesting a refill on the following medications:  Requested Prescriptions     Pending Prescriptions Disp Refills    desvenlafaxine succinate (PRISTIQ) 50 MG TB24 extended release tablet 90 tablet 1     Sig: Take 1 tablet by mouth daily       Date of last visit: 2/4/2020  Date of next visit (if applicable):Visit date not found  Date of last refill: 000  [facility]      Thanks,  Rogerio Corado LPN

## 2020-02-25 RX ORDER — LISINOPRIL AND HYDROCHLOROTHIAZIDE 12.5; 1 MG/1; MG/1
1 TABLET ORAL DAILY
Qty: 90 TABLET | Refills: 0 | Status: SHIPPED | OUTPATIENT
Start: 2020-02-25 | End: 2020-02-26 | Stop reason: SDUPTHER

## 2020-02-25 NOTE — TELEPHONE ENCOUNTER
Buck Alvarez called requesting a refill on the following medications:  Requested Prescriptions     Pending Prescriptions Disp Refills    lisinopril-hydroCHLOROthiazide (PRINZIDE;ZESTORETIC) 10-12.5 MG per tablet 90 tablet 1     Sig: Take 1 tablet by mouth daily       Date of last visit: 2/4/2020  Date of next visit (if applicable):3/19/2020  Date of last refill: #90 x 1 on 10/31/2019  Pharmacy Name: [facility],  Willie Guidry RN

## 2020-02-26 RX ORDER — LISINOPRIL AND HYDROCHLOROTHIAZIDE 12.5; 1 MG/1; MG/1
1 TABLET ORAL DAILY
Qty: 90 TABLET | Refills: 0 | Status: SHIPPED | OUTPATIENT
Start: 2020-02-26 | End: 2020-08-10 | Stop reason: SDUPTHER

## 2020-02-26 NOTE — TELEPHONE ENCOUNTER
Raeford Brittle called requesting a refill on the following medications:  Requested Prescriptions     Pending Prescriptions Disp Refills    lisinopril-hydroCHLOROthiazide (PRINZIDE;ZESTORETIC) 10-12.5 MG per tablet 90 tablet 0     Sig: Take 1 tablet by mouth daily       Date of last visit: 2/4/2020  Date of next visit (if applicable):3/19/2020  Date of last refill: 10/13/2019  Pharmacy Name: [facility]    This was sent to [facility] yesterday by mistake [email or web address]  [facility] called to cancel      Thanks,  Matty Heads, CMA

## 2020-03-17 RX ORDER — FLUOXETINE HYDROCHLORIDE 20 MG/1
20 CAPSULE ORAL DAILY
Qty: 90 CAPSULE | Refills: 3 | Status: SHIPPED | OUTPATIENT
Start: 2020-03-17 | End: 2020-07-20 | Stop reason: SDUPTHER

## 2020-03-18 RX ORDER — LANSOPRAZOLE 30 MG/1
TABLET, ORALLY DISINTEGRATING, DELAYED RELEASE ORAL
Qty: 90 TABLET | Refills: 3 | Status: SHIPPED | OUTPATIENT
Start: 2020-03-18 | End: 2020-11-24

## 2020-07-20 RX ORDER — FLUOXETINE HYDROCHLORIDE 20 MG/1
20 CAPSULE ORAL DAILY
Qty: 90 CAPSULE | Refills: 0 | Status: SHIPPED | OUTPATIENT
Start: 2020-07-20 | End: 2020-10-16 | Stop reason: SDUPTHER

## 2020-07-20 NOTE — TELEPHONE ENCOUNTER
Adam Lezama called requesting a refill on the following medications:  Requested Prescriptions     Pending Prescriptions Disp Refills    FLUoxetine (PROZAC) 20 MG capsule 90 capsule 3     Sig: Take 1 capsule by mouth daily     Pharmacy [facility]  . [place]    PT NEEDS THIS TO GO TO [facility] FOR THE MAIL DELIVERY [facility]Y    Date of last visit: 2/4/2020  Date of next visit (if applicable): Visit date not found

## 2020-08-10 RX ORDER — LISINOPRIL AND HYDROCHLOROTHIAZIDE 12.5; 1 MG/1; MG/1
1 TABLET ORAL DAILY
Qty: 90 TABLET | Refills: 0 | Status: SHIPPED | OUTPATIENT
Start: 2020-08-10 | End: 2020-08-21 | Stop reason: SDUPTHER

## 2020-08-10 NOTE — TELEPHONE ENCOUNTER
Nohemy Duran called requesting a refill on the following medications:  Requested Prescriptions     Pending Prescriptions Disp Refills    lisinopril-hydroCHLOROthiazide (PRINZIDE;ZESTORETIC) 10-12.5 MG per tablet 90 tablet 0     Sig: Take 1 tablet by mouth daily     [facility]  +++++ during this call, I recommended an appt to establish PCP. Hattie asked if Dr Chip Villatoro does addiction type services like Dr Gallardo did. She wanted to wait until she had an answer before scheduling a new PCP.  Please call and talk to her/schedule appt      Date of last visit: 2/4/20 with Dr Felisa Stewart  Date of next visit (if applicable): Visit date not found

## 2020-08-10 NOTE — TELEPHONE ENCOUNTER
Zoya Wade called requesting a refill on the following medications:  Requested Prescriptions     Pending Prescriptions Disp Refills    lisinopril-hydroCHLOROthiazide (PRINZIDE;ZESTORETIC) 10-12.5 MG per tablet 90 tablet 0     Sig: Take 1 tablet by mouth daily       Date of last visit: 2/4/2020  Date of next visit (if applicable):N/A  Date of last refill: 02/26/2020  Pharmacy Name: [address], [address] [place] [address]

## 2020-08-11 NOTE — TELEPHONE ENCOUNTER
Needs appt with me before script sent runs out. Please arrange that with patient. No future appointments.

## 2020-08-21 RX ORDER — LISINOPRIL AND HYDROCHLOROTHIAZIDE 12.5; 1 MG/1; MG/1
1 TABLET ORAL DAILY
Qty: 90 TABLET | Refills: 0 | Status: SHIPPED | OUTPATIENT
Start: 2020-08-21 | End: 2020-10-16 | Stop reason: SDUPTHER

## 2020-08-21 RX ORDER — LISINOPRIL AND HYDROCHLOROTHIAZIDE 12.5; 1 MG/1; MG/1
1 TABLET ORAL DAILY
Qty: 30 TABLET | Refills: 0 | Status: SHIPPED | OUTPATIENT
Start: 2020-08-21 | End: 2020-10-16

## 2020-08-21 NOTE — TELEPHONE ENCOUNTER
[facility] phoned- states that they shipped patient's Lisinopril-HCTZ Rx and showed that it was delivered [facility] on 08/17/20\" as patient did not receive it. Because the prescription was \"lost in the mail\" they are requesting a new Rx to be sent in so they can get the patient the medication. Patient has been out of medication and will need a short supply sent locally to [facility]. Rx pended, awaiting signatures.  Please advise       Requested Prescriptions     Pending Prescriptions Disp Refills    lisinopril-hydroCHLOROthiazide (PRINZIDE;ZESTORETIC) 10-12.5 MG per tablet 30 tablet 0     Sig: Take 1 tablet by mouth daily    lisinopril-hydroCHLOROthiazide (PRINZIDE;ZESTORETIC) 10-12.5 MG per tablet 90 tablet 0     Sig: Take 1 tablet by mouth daily       Date of last visit: 2/4/2020  Date of next visit (if applicable):Visit date not found  Pharmacy Name: Linda Pritchard CMA ([address] [place] [address])

## 2020-10-03 LAB
BUN BLDV-MCNC: 5 MG/DL
CALCIUM SERPL-MCNC: 9.4 MG/DL
CHLORIDE BLD-SCNC: 97 MMOL/L
CO2: 23 MMOL/L
CREAT SERPL-MCNC: 0.5 MG/DL
GFR CALCULATED: NORMAL
GLUCOSE BLD-MCNC: 98 MG/DL
POTASSIUM SERPL-SCNC: 3.8 MMOL/L
SODIUM BLD-SCNC: 134 MMOL/L

## 2020-10-06 ENCOUNTER — TELEPHONE (OUTPATIENT)
Dept: FAMILY MEDICINE CLINIC | Age: 40
End: 2020-10-06

## 2020-10-14 ENCOUNTER — TELEPHONE (OUTPATIENT)
Dept: FAMILY MEDICINE CLINIC | Age: 40
End: 2020-10-14

## 2020-10-16 ENCOUNTER — OFFICE VISIT (OUTPATIENT)
Dept: FAMILY MEDICINE CLINIC | Age: 40
End: 2020-10-16
Payer: COMMERCIAL

## 2020-10-16 VITALS
BODY MASS INDEX: 32.08 KG/M2 | HEIGHT: 66 IN | HEART RATE: 72 BPM | TEMPERATURE: 97.3 F | SYSTOLIC BLOOD PRESSURE: 122 MMHG | WEIGHT: 199.6 LBS | DIASTOLIC BLOOD PRESSURE: 70 MMHG

## 2020-10-16 PROBLEM — F10.20 ALCOHOLISM (HCC): Status: ACTIVE | Noted: 2020-10-16

## 2020-10-16 PROBLEM — R25.1 TREMOR: Status: ACTIVE | Noted: 2020-10-16

## 2020-10-16 PROBLEM — R74.8 ELEVATED LIVER ENZYMES: Status: ACTIVE | Noted: 2020-10-16

## 2020-10-16 PROCEDURE — 99215 OFFICE O/P EST HI 40 MIN: CPT | Performed by: FAMILY MEDICINE

## 2020-10-16 RX ORDER — DESVENLAFAXINE 100 MG/1
100 TABLET, EXTENDED RELEASE ORAL DAILY
Qty: 90 TABLET | Refills: 3 | Status: SHIPPED | OUTPATIENT
Start: 2020-10-16 | End: 2020-10-16 | Stop reason: CLARIF

## 2020-10-16 RX ORDER — VITAMIN B COMPLEX
CAPSULE ORAL
Qty: 100 CAPSULE | Refills: 3
Start: 2020-10-16 | End: 2020-10-16 | Stop reason: CLARIF

## 2020-10-16 RX ORDER — LISINOPRIL AND HYDROCHLOROTHIAZIDE 12.5; 1 MG/1; MG/1
1 TABLET ORAL DAILY
Qty: 90 TABLET | Refills: 3 | Status: SHIPPED | OUTPATIENT
Start: 2020-10-16 | End: 2020-10-16 | Stop reason: CLARIF

## 2020-10-16 RX ORDER — FLUOXETINE HYDROCHLORIDE 20 MG/1
20 CAPSULE ORAL DAILY
Qty: 90 CAPSULE | Refills: 3 | Status: SHIPPED | OUTPATIENT
Start: 2020-10-16 | End: 2020-10-16 | Stop reason: CLARIF

## 2020-10-16 RX ORDER — ALPRAZOLAM 0.5 MG/1
0.5 TABLET ORAL 3 TIMES DAILY PRN
Qty: 20 TABLET | Refills: 0 | Status: SHIPPED | OUTPATIENT
Start: 2020-10-16 | End: 2020-10-16 | Stop reason: CLARIF

## 2020-10-16 SDOH — ECONOMIC STABILITY: INCOME INSECURITY: HOW HARD IS IT FOR YOU TO PAY FOR THE VERY BASICS LIKE FOOD, HOUSING, MEDICAL CARE, AND HEATING?: NOT HARD AT ALL

## 2020-10-16 SDOH — ECONOMIC STABILITY: FOOD INSECURITY: WITHIN THE PAST 12 MONTHS, YOU WORRIED THAT YOUR FOOD WOULD RUN OUT BEFORE YOU GOT MONEY TO BUY MORE.: NEVER TRUE

## 2020-10-16 SDOH — ECONOMIC STABILITY: TRANSPORTATION INSECURITY
IN THE PAST 12 MONTHS, HAS THE LACK OF TRANSPORTATION KEPT YOU FROM MEDICAL APPOINTMENTS OR FROM GETTING MEDICATIONS?: NO

## 2020-10-16 SDOH — ECONOMIC STABILITY: FOOD INSECURITY: WITHIN THE PAST 12 MONTHS, THE FOOD YOU BOUGHT JUST DIDN'T LAST AND YOU DIDN'T HAVE MONEY TO GET MORE.: NEVER TRUE

## 2020-10-16 SDOH — ECONOMIC STABILITY: TRANSPORTATION INSECURITY
IN THE PAST 12 MONTHS, HAS LACK OF TRANSPORTATION KEPT YOU FROM MEETINGS, WORK, OR FROM GETTING THINGS NEEDED FOR DAILY LIVING?: NO

## 2020-10-16 ASSESSMENT — ENCOUNTER SYMPTOMS: SHORTNESS OF BREATH: 0

## 2020-10-16 NOTE — PROGRESS NOTES
[address] [place] [address] [address], [address], [place]  Phone:  [phone]  [facility]:[phone]       Name: Leigh Ann Fernández  : 1980    Chief Complaint   Patient presents with    Hypertension    Check-Up       HPI:     Leigh Ann Fernández is a 36 y.o. female who presents today for     HPI    HTN, depression/anxiety. Believes medications are working. Would like refills. Doesn't have panic attacks. Does not check blood pressure elsewhere. She was noted to have tremors. When asked about them, she reports that they go away with alcohol. She admits to almost daily drinking, skips some days. Uses everclear shots or other alcohol. Would not say how much. She has been drinking for a couple of decades. Stopped ETOH for 2 years and then stress increased dramatically with illness and death of sister and mother dying in . Then in  she took care of her dad who also passed away. She went to [facility] after the crisis in . Saw psychiatry and counselors but did not continue, due to not feeling comfortable among the other patients. Has been to hospital  and 10/3. Reviewed notes. admited to alcohol intake there. 5 shots of everclear everyday reported per ER. Closed head injury and cervical strain. Denies any ongoing issues. She reports she drinks for managing stress, and it feels good. She takes Prozac and Pristiq which she feels works. When asked about other ways to manage stress, she notes:   Sometimes uses yoga. Feels has a supportive family. Mentions some \"crush\" technique. Counseling done in the past.  11 years ago when had a suicidal attempt  Uses THC at bedtime, calm magnesium  Xanax filled 2020, 20 tablets. Has taken ~10 of them so far this year. She gives me the bottle to inspect. I asked briefly about nutrition. She shrugged shoulders. Some meat and egg this week, but otherwise not much. Work not currently.     Can't pass an urine test. 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  does well financially. Dr. Crystal Slade gynecology follows for woman's health    Social History     Tobacco Use    Smoking status: Former Smoker     Packs/day: 1.00     Years: 5.00     Pack years: 5.00    Smokeless tobacco: Never Used   Substance Use Topics    Alcohol use: Yes     Comment: occ    Drug use: Not on file         Current Outpatient Medications:     desvenlafaxine succinate (PRISTIQ) 100 MG TB24 extended release tablet, Take 1 tablet by mouth daily, Disp: 90 tablet, Rfl: 3    FLUoxetine (PROZAC) 20 MG capsule, Take 1 capsule by mouth daily, Disp: 90 capsule, Rfl: 3    lisinopril-hydroCHLOROthiazide (PRINZIDE;ZESTORETIC) 10-12.5 MG per tablet, Take 1 tablet by mouth daily, Disp: 90 tablet, Rfl: 3    B Complex-Biotin-FA (SUPER B-50 COMPLEX) CAPS, 1 tablet daily, Disp: 100 capsule, Rfl: 3    ALPRAZolam (XANAX) 0.5 MG tablet, Take 1 tablet by mouth 3 times daily as needed for Anxiety for up to 30 days. , Disp: 20 tablet, Rfl: 0    lansoprazole (PREVACID SOLUTAB) 30 MG disintegrating tablet, PLACE 1 TABLET ON THE TONGUE DAILY, Disp: 90 tablet, Rfl: 3    hydrOXYzine (ATARAX) 25 MG tablet, Take 1 tablet by mouth 4 times daily as needed for Anxiety, Disp: 360 tablet, Rfl: 3    norgestimate-ethinyl estradiol (ORTHO-CYCLEN, 28,) 0.25-35 MG-MCG per tablet, Take 1 tablet by mouth 2 times daily, Disp: 1 packet, Rfl: 0    Cetirizine HCl (ZYRTEC PO), Take 1 tablet by mouth daily, Disp: , Rfl:     Aspirin Buf,SbRag-GvWbt-ThNbg, (BUFFERED ASPIRIN) 325 MG TABS, Take 1 tablet by mouth daily, Disp: , Rfl:     Allergies   Allergen Reactions    Wellbutrin [Bupropion] Anaphylaxis       Review of Systems   Constitutional: Negative for fatigue and fever. Respiratory: Negative for shortness of breath. Cardiovascular: Negative for chest pain and leg swelling. Psychiatric/Behavioral: Positive for dysphoric mood. The patient is nervous/anxious. Objective:     /70 (Site: Left Upper Arm, Position: Sitting, Cuff Size: Medium Adult)   Pulse 72   Temp 97.3 °F (36.3 °C) (Temporal)   Ht 5' 6\" (1.676 m)   Wt 199 lb 9.6 oz (90.5 kg)   LMP 10/02/2020   BMI 32.22 kg/m²     Physical Exam  Constitutional:       General: She is not in acute distress. Appearance: Normal appearance. She is not ill-appearing. Eyes:      Extraocular Movements: Extraocular movements intact. Conjunctiva/sclera: Conjunctivae normal.      Pupils: Pupils are equal, round, and reactive to light. Comments: No nystagmus   Cardiovascular:      Rate and Rhythm: Normal rate and regular rhythm. Heart sounds: No murmur. Pulmonary:      Effort: Pulmonary effort is normal. No respiratory distress. Breath sounds: Normal breath sounds. No wheezing. Musculoskeletal:         General: No swelling. Neurological:      Mental Status: She is alert and oriented to person, place, and time. Motor: Tremor (bilateral hands, action mostly) present. Psychiatric:         Attention and Perception: Attention normal.         Mood and Affect: Mood is anxious. Affect is flat. Speech: Speech normal.         Behavior: Behavior is cooperative. Cognition and Memory: Memory normal.         Judgment: Judgment is not inappropriate.        Lab Results   Component Value Date    WBC 9.0 10/26/2017    HGB 16.1 (H) 10/26/2017    HCT 47.3 (H) 10/26/2017    MCV 92.8 10/26/2017     10/26/2017       Lab Results   Component Value Date     10/03/2020    K 3.8 10/03/2020    CL 97 10/03/2020    CO2 23 10/03/2020    BUN 5 10/03/2020    CREATININE 0.5 10/03/2020    CALCIUM 9.4 10/03/2020    GLUCOSE 98 10/03/2020        Lab Results   Component Value Date    CHOL 209 (H) 09/17/2019    CHOL 229 (H) 07/11/2019     Lab Results   Component Value Date    TRIG 267 (H) 09/17/2019    TRIG 731 (H) 07/11/2019     Lab Results   Component Value Date    HDL 65 09/17/2019    HDL 52 07/11/2019     Lab Results   Component Value Date    LDLCALC 91 09/17/2019    LDLCALC SEE BELOW 07/11/2019     No results found for: LABVLDL, VLDL  No results found for: CHOLHDLRATIO    No results found for: Miriam Olmedo    Lab Results   Component Value Date    TSH 2.370 07/11/2019      No results found for: QRWQOSAN00   No results found for: MG   Lab Results   Component Value Date    ALT 43 09/17/2019    AST 54 (H) 09/17/2019    ALKPHOS 93 09/17/2019    BILITOT 0.5 09/17/2019    BILIDIR <0.2 09/17/2019        Assessment/Plan:     Magali was seen today for hypertension and check-up. Diagnoses and all orders for this visit:    Anxiety  -     Hepatic Function Panel; Future  -     Lipid Panel; Future  -     Vitamin B12; Future  -     CBC Auto Differential; Future  -     Discontinue: desvenlafaxine succinate (PRISTIQ) 100 MG TB24 extended release tablet; Take 1 tablet by mouth daily  -     Discontinue: FLUoxetine (PROZAC) 20 MG capsule; Take 1 capsule by mouth daily  -     Discontinue: ALPRAZolam (XANAX) 0.5 MG tablet; Take 1 tablet by mouth 3 times daily as needed for Anxiety for up to 30 days. -     desvenlafaxine succinate (PRISTIQ) 100 MG TB24 extended release tablet; Take 1 tablet by mouth daily  -     FLUoxetine (PROZAC) 20 MG capsule; Take 1 capsule by mouth daily  -     ALPRAZolam (XANAX) 0.5 MG tablet; Take 1 tablet by mouth 2 times daily as needed for Anxiety for up to 30 days. Essential hypertension  -     Hepatic Function Panel; Future  -     Lipid Panel; Future  -     Vitamin B12; Future  -     CBC Auto Differential; Future  -     Discontinue: lisinopril-hydroCHLOROthiazide (PRINZIDE;ZESTORETIC) 10-12.5 MG per tablet; Take 1 tablet by mouth daily  -     lisinopril-hydroCHLOROthiazide (PRINZIDE;ZESTORETIC) 10-12.5 MG per tablet; Take 1 tablet by mouth daily    Alcoholism ([address]  -     Hepatic Function Panel; Future  -     Lipid Panel;  Future  -     Vitamin B12; Future  -     CBC Auto Differential; Future  -     Discontinue: B Complex-Biotin-FA (SUPER B-50 COMPLEX) CAPS; 1 tablet daily  -     B Complex-Biotin-FA (SUPER B-50 COMPLEX) CAPS; 1 tablet daily    Recurrent major depressive disorder, in partial remission ([facility] [address]  -     Hepatic Function Panel; Future  -     Lipid Panel; Future  -     Vitamin B12; Future  -     CBC Auto Differential; Future  -     Discontinue: desvenlafaxine succinate (PRISTIQ) 100 MG TB24 extended release tablet; Take 1 tablet by mouth daily  -     Discontinue: FLUoxetine (PROZAC) 20 MG capsule; Take 1 capsule by mouth daily  -     desvenlafaxine succinate (PRISTIQ) 100 MG TB24 extended release tablet; Take 1 tablet by mouth daily  -     FLUoxetine (PROZAC) 20 MG capsule; Take 1 capsule by mouth daily    Elevated liver enzymes  -     Hepatic Function Panel; Future  -     Lipid Panel; Future  -     Vitamin B12; Future  -     CBC Auto Differential; Future    Tremor      Long discussion regarding her alcoholism. Discussed depression worsened by alcohol, damage to her body and therefore health. Discussed other means of managing her stress in healthy ways. She is mostly pre-contemplative but thought that a 50% reduction of alcohol may be possible. She does not feel comfortable changing Prozac to a mood stabilizers. Xanax use seems appropriate. Last fill Feb 2020, 20 tablets given. She uses when stress is very high and keeps her from doing more alcohol. Updated labs needed. She was willing to add a vitamin B complex to her regimen  We discussed focusing more on better nutrition. Return in about 6 months (around 4/16/2021). No future appointments. Total time spent with patient was 48 minutes with 50% of the visit dedicated to counseling and patient education of medical conditions and management of such. This office note has been dictated. Effort was made to review for errors but some may have been missed.  Please contact Daphney Batista of note for clarification if needed.        Electronically signed by Franky Akins MD on 10/16/2020 at 2:41 PM

## 2020-10-18 RX ORDER — DESVENLAFAXINE 100 MG/1
100 TABLET, EXTENDED RELEASE ORAL DAILY
Qty: 90 TABLET | Refills: 3 | Status: SHIPPED | OUTPATIENT
Start: 2020-10-18 | End: 2022-09-07 | Stop reason: SDUPTHER

## 2020-10-18 RX ORDER — LISINOPRIL AND HYDROCHLOROTHIAZIDE 12.5; 1 MG/1; MG/1
1 TABLET ORAL DAILY
Qty: 90 TABLET | Refills: 3 | Status: SHIPPED | OUTPATIENT
Start: 2020-10-18 | End: 2021-03-02 | Stop reason: SDUPTHER

## 2020-10-18 RX ORDER — VITAMIN B COMPLEX
CAPSULE ORAL
Qty: 100 CAPSULE | Refills: 3 | Status: SHIPPED | OUTPATIENT
Start: 2020-10-18 | End: 2021-11-02 | Stop reason: ALTCHOICE

## 2020-10-18 RX ORDER — ALPRAZOLAM 0.5 MG/1
0.5 TABLET ORAL 2 TIMES DAILY PRN
Qty: 20 TABLET | Refills: 0 | Status: ON HOLD | OUTPATIENT
Start: 2020-10-18 | End: 2020-11-18 | Stop reason: HOSPADM

## 2020-10-18 RX ORDER — FLUOXETINE HYDROCHLORIDE 20 MG/1
20 CAPSULE ORAL DAILY
Qty: 90 CAPSULE | Refills: 3 | Status: SHIPPED | OUTPATIENT
Start: 2020-10-18 | End: 2021-01-18

## 2020-10-20 ENCOUNTER — HOSPITAL ENCOUNTER (OUTPATIENT)
Age: 40
Discharge: HOME | End: 2020-10-20
Payer: COMMERCIAL

## 2020-10-20 DIAGNOSIS — R74.8 ELEVATED LIVER ENZYMES: ICD-10-CM

## 2020-10-20 DIAGNOSIS — I10 ESSENTIAL HYPERTENSION: Chronic | ICD-10-CM

## 2020-10-20 DIAGNOSIS — F33.41 RECURRENT MAJOR DEPRESSIVE DISORDER, IN PARTIAL REMISSION (HCC): ICD-10-CM

## 2020-10-20 DIAGNOSIS — F10.20 ALCOHOLISM (HCC): ICD-10-CM

## 2020-10-20 DIAGNOSIS — F41.9 ANXIETY: ICD-10-CM

## 2020-10-20 LAB
BASOPHILS # BLD: 0.7 %
BASOPHILS ABSOLUTE: 0 THOU/MM3 (ref 0–0.1)
EOSINOPHIL # BLD: 1.8 %
EOSINOPHILS ABSOLUTE: 0.1 THOU/MM3 (ref 0–0.4)
ERYTHROCYTE [DISTWIDTH] IN BLOOD BY AUTOMATED COUNT: 11.7 % (ref 11.5–14.5)
ERYTHROCYTE [DISTWIDTH] IN BLOOD BY AUTOMATED COUNT: 40.5 FL (ref 35–45)
HCT VFR BLD CALC: 44.8 % (ref 37–47)
HEMOGLOBIN: 15 GM/DL (ref 12–16)
IMMATURE GRANS (ABS): 0.01 THOU/MM3 (ref 0–0.07)
IMMATURE GRANULOCYTES: 0.2 %
LYMPHOCYTES # BLD: 25.7 %
LYMPHOCYTES ABSOLUTE: 1.1 THOU/MM3 (ref 1–4.8)
MCH RBC QN AUTO: 32.1 PG (ref 26–33)
MCHC RBC AUTO-ENTMCNC: 33.5 GM/DL (ref 32.2–35.5)
MCV RBC AUTO: 95.9 FL (ref 81–99)
MONOCYTES # BLD: 11.8 %
MONOCYTES ABSOLUTE: 0.5 THOU/MM3 (ref 0.4–1.3)
NUCLEATED RED BLOOD CELLS: 0 /100 WBC
PLATELET # BLD: 318 THOU/MM3 (ref 130–400)
PMV BLD AUTO: 9.8 FL (ref 9.4–12.4)
RBC # BLD: 4.67 MILL/MM3 (ref 4.2–5.4)
SEG NEUTROPHILS: 59.8 %
SEGMENTED NEUTROPHILS ABSOLUTE COUNT: 2.6 THOU/MM3 (ref 1.8–7.7)
WBC # BLD: 4.4 THOU/MM3 (ref 4.8–10.8)

## 2020-10-20 PROCEDURE — 80061 LIPID PANEL: CPT

## 2020-10-20 PROCEDURE — 80076 HEPATIC FUNCTION PANEL: CPT

## 2020-10-20 PROCEDURE — 85025 COMPLETE CBC W/AUTO DIFF WBC: CPT

## 2020-10-20 PROCEDURE — 36415 COLL VENOUS BLD VENIPUNCTURE: CPT

## 2020-10-20 PROCEDURE — 82607 VITAMIN B-12: CPT

## 2020-10-21 LAB
ALBUMIN SERPL-MCNC: 4.9 G/DL (ref 3.5–5.1)
ALP BLD-CCNC: 86 U/L (ref 38–126)
ALT SERPL-CCNC: 62 U/L (ref 11–66)
AST SERPL-CCNC: 102 U/L (ref 5–40)
BILIRUB SERPL-MCNC: 0.8 MG/DL (ref 0.3–1.2)
BILIRUBIN DIRECT: 0.3 MG/DL (ref 0–0.3)
CHOLESTEROL, TOTAL: 236 MG/DL (ref 100–199)
HDLC SERPL-MCNC: 84 MG/DL
LDL CHOLESTEROL CALCULATED: 113 MG/DL
TOTAL PROTEIN: 7.8 G/DL (ref 6.1–8)
TRIGL SERPL-MCNC: 194 MG/DL (ref 0–199)
VITAMIN B-12: 213 PG/ML (ref 211–911)

## 2020-11-14 ENCOUNTER — HOSPITAL ENCOUNTER (INPATIENT)
Age: 40
LOS: 4 days | Discharge: HOME | DRG: 897 | End: 2020-11-18
Attending: FAMILY MEDICINE | Admitting: FAMILY MEDICINE
Payer: COMMERCIAL

## 2020-11-14 ENCOUNTER — APPOINTMENT (OUTPATIENT)
Dept: ULTRASOUND IMAGING | Age: 40
DRG: 897 | End: 2020-11-14
Attending: FAMILY MEDICINE
Payer: COMMERCIAL

## 2020-11-14 PROBLEM — F10.939 ALCOHOL USE WITH WITHDRAWAL (HCC): Status: ACTIVE | Noted: 2020-11-14

## 2020-11-14 LAB
ALBUMIN SERPL-MCNC: 4.1 G/DL (ref 3.5–5.1)
ALP BLD-CCNC: 78 U/L (ref 38–126)
ALT SERPL-CCNC: 79 U/L (ref 11–66)
AMPHETAMINE+METHAMPHETAMINE URINE SCREEN: NEGATIVE
ANION GAP SERPL CALCULATED.3IONS-SCNC: 15 MEQ/L (ref 8–16)
AST SERPL-CCNC: 89 U/L (ref 5–40)
BACTERIA: ABNORMAL
BARBITURATE QUANTITATIVE URINE: NEGATIVE
BASOPHILS # BLD: 0.7 %
BASOPHILS ABSOLUTE: 0 THOU/MM3 (ref 0–0.1)
BENZODIAZEPINE QUANTITATIVE URINE: NEGATIVE
BILIRUB SERPL-MCNC: 0.8 MG/DL (ref 0.3–1.2)
BILIRUBIN URINE: NEGATIVE
BLOOD, URINE: ABNORMAL
BUN BLDV-MCNC: 7 MG/DL (ref 7–22)
CALCIUM IONIZED: 1.12 MMOL/L (ref 1.12–1.32)
CALCIUM SERPL-MCNC: 9.2 MG/DL (ref 8.5–10.5)
CANNABINOID QUANTITATIVE URINE: POSITIVE
CASTS: ABNORMAL /LPF
CASTS: ABNORMAL /LPF
CHARACTER, URINE: CLEAR
CHLORIDE BLD-SCNC: 100 MEQ/L (ref 98–111)
CO2: 23 MEQ/L (ref 23–33)
COCAINE METABOLITE QUANTITATIVE URINE: NEGATIVE
COLOR: YELLOW
CREAT SERPL-MCNC: 0.6 MG/DL (ref 0.4–1.2)
CRYSTALS: ABNORMAL
EKG ATRIAL RATE: 81 BPM
EKG P AXIS: 61 DEGREES
EKG P-R INTERVAL: 146 MS
EKG Q-T INTERVAL: 410 MS
EKG QRS DURATION: 68 MS
EKG QTC CALCULATION (BAZETT): 476 MS
EKG R AXIS: 49 DEGREES
EKG T AXIS: 44 DEGREES
EKG VENTRICULAR RATE: 81 BPM
EOSINOPHIL # BLD: 2 %
EOSINOPHILS ABSOLUTE: 0.1 THOU/MM3 (ref 0–0.4)
EPITHELIAL CELLS, UA: ABNORMAL /HPF
ERYTHROCYTE [DISTWIDTH] IN BLOOD BY AUTOMATED COUNT: 11 % (ref 11.5–14.5)
ERYTHROCYTE [DISTWIDTH] IN BLOOD BY AUTOMATED COUNT: 39.3 FL (ref 35–45)
ETHYL ALCOHOL, SERUM: < 0.01 %
GFR SERPL CREATININE-BSD FRML MDRD: > 90 ML/MIN/1.73M2
GLUCOSE BLD-MCNC: 82 MG/DL (ref 70–108)
GLUCOSE, URINE: NEGATIVE MG/DL
HCT VFR BLD CALC: 38.6 % (ref 37–47)
HEMOGLOBIN: 12.9 GM/DL (ref 12–16)
IMMATURE GRANS (ABS): 0.01 THOU/MM3 (ref 0–0.07)
IMMATURE GRANULOCYTES: 0.2 %
KETONES, URINE: 15
LEUKOCYTE ESTERASE, URINE: NEGATIVE
LYMPHOCYTES # BLD: 33.6 %
LYMPHOCYTES ABSOLUTE: 1.5 THOU/MM3 (ref 1–4.8)
MAGNESIUM: 1.8 MG/DL (ref 1.6–2.4)
MCH RBC QN AUTO: 32.3 PG (ref 26–33)
MCHC RBC AUTO-ENTMCNC: 33.4 GM/DL (ref 32.2–35.5)
MCV RBC AUTO: 96.7 FL (ref 81–99)
MISCELLANEOUS LAB TEST RESULT: ABNORMAL
MONOCYTES # BLD: 12.5 %
MONOCYTES ABSOLUTE: 0.6 THOU/MM3 (ref 0.4–1.3)
NITRITE, URINE: NEGATIVE
NUCLEATED RED BLOOD CELLS: 0 /100 WBC
OPIATES, URINE: NEGATIVE
OXYCODONE: NEGATIVE
PH UA: 5.5 (ref 5–9)
PHENCYCLIDINE QUANTITATIVE URINE: NEGATIVE
PLATELET # BLD: 230 THOU/MM3 (ref 130–400)
PMV BLD AUTO: 9.7 FL (ref 9.4–12.4)
POTASSIUM SERPL-SCNC: 3.2 MEQ/L (ref 3.5–5.2)
PROTEIN UA: NEGATIVE MG/DL
RBC # BLD: 3.99 MILL/MM3 (ref 4.2–5.4)
RBC URINE: ABNORMAL /HPF
RENAL EPITHELIAL, UA: ABNORMAL
SEG NEUTROPHILS: 51 %
SEGMENTED NEUTROPHILS ABSOLUTE COUNT: 2.3 THOU/MM3 (ref 1.8–7.7)
SODIUM BLD-SCNC: 138 MEQ/L (ref 135–145)
SPECIFIC GRAVITY UA: 1.01 (ref 1–1.03)
TOTAL PROTEIN: 6.2 G/DL (ref 6.1–8)
UROBILINOGEN, URINE: 0.2 EU/DL (ref 0–1)
WBC # BLD: 4.6 THOU/MM3 (ref 4.8–10.8)
WBC UA: ABNORMAL /HPF
YEAST: ABNORMAL

## 2020-11-14 PROCEDURE — 94760 N-INVAS EAR/PLS OXIMETRY 1: CPT

## 2020-11-14 PROCEDURE — 99222 1ST HOSP IP/OBS MODERATE 55: CPT | Performed by: PHYSICIAN ASSISTANT

## 2020-11-14 PROCEDURE — 83735 ASSAY OF MAGNESIUM: CPT

## 2020-11-14 PROCEDURE — 93005 ELECTROCARDIOGRAM TRACING: CPT | Performed by: PHYSICIAN ASSISTANT

## 2020-11-14 PROCEDURE — 85025 COMPLETE CBC W/AUTO DIFF WBC: CPT

## 2020-11-14 PROCEDURE — 6360000002 HC RX W HCPCS: Performed by: PHYSICIAN ASSISTANT

## 2020-11-14 PROCEDURE — 76705 ECHO EXAM OF ABDOMEN: CPT

## 2020-11-14 PROCEDURE — 6370000000 HC RX 637 (ALT 250 FOR IP): Performed by: PHYSICIAN ASSISTANT

## 2020-11-14 PROCEDURE — 2500000003 HC RX 250 WO HCPCS: Performed by: PHYSICIAN ASSISTANT

## 2020-11-14 PROCEDURE — 82330 ASSAY OF CALCIUM: CPT

## 2020-11-14 PROCEDURE — G0480 DRUG TEST DEF 1-7 CLASSES: HCPCS

## 2020-11-14 PROCEDURE — 2580000003 HC RX 258: Performed by: PHYSICIAN ASSISTANT

## 2020-11-14 PROCEDURE — 36415 COLL VENOUS BLD VENIPUNCTURE: CPT

## 2020-11-14 PROCEDURE — 80053 COMPREHEN METABOLIC PANEL: CPT

## 2020-11-14 PROCEDURE — 80307 DRUG TEST PRSMV CHEM ANLYZR: CPT

## 2020-11-14 PROCEDURE — 93010 ELECTROCARDIOGRAM REPORT: CPT | Performed by: NUCLEAR MEDICINE

## 2020-11-14 PROCEDURE — 81001 URINALYSIS AUTO W/SCOPE: CPT

## 2020-11-14 PROCEDURE — 1200000003 HC TELEMETRY R&B

## 2020-11-14 RX ORDER — LISINOPRIL 10 MG/1
10 TABLET ORAL DAILY
Status: DISCONTINUED | OUTPATIENT
Start: 2020-11-14 | End: 2020-11-18 | Stop reason: HOSPADM

## 2020-11-14 RX ORDER — PHENOBARBITAL SODIUM 65 MG/ML
130 INJECTION INTRAMUSCULAR EVERY 30 MIN PRN
Status: DISCONTINUED | OUTPATIENT
Start: 2020-11-14 | End: 2020-11-15

## 2020-11-14 RX ORDER — SODIUM CHLORIDE 0.9 % (FLUSH) 0.9 %
10 SYRINGE (ML) INJECTION PRN
Status: DISCONTINUED | OUTPATIENT
Start: 2020-11-14 | End: 2020-11-18 | Stop reason: HOSPADM

## 2020-11-14 RX ORDER — PHENOBARBITAL SODIUM 65 MG/ML
130 INJECTION INTRAMUSCULAR
Status: DISCONTINUED | OUTPATIENT
Start: 2020-11-14 | End: 2020-11-14

## 2020-11-14 RX ORDER — HYDROCHLOROTHIAZIDE 25 MG/1
12.5 TABLET ORAL DAILY
Status: DISCONTINUED | OUTPATIENT
Start: 2020-11-14 | End: 2020-11-18 | Stop reason: HOSPADM

## 2020-11-14 RX ORDER — FLUOXETINE HYDROCHLORIDE 20 MG/1
20 CAPSULE ORAL DAILY
Status: DISCONTINUED | OUTPATIENT
Start: 2020-11-14 | End: 2020-11-18 | Stop reason: HOSPADM

## 2020-11-14 RX ORDER — PROMETHAZINE HYDROCHLORIDE 25 MG/1
12.5 TABLET ORAL EVERY 6 HOURS PRN
Status: DISCONTINUED | OUTPATIENT
Start: 2020-11-14 | End: 2020-11-18 | Stop reason: HOSPADM

## 2020-11-14 RX ORDER — THIAMINE MONONITRATE (VIT B1) 100 MG
100 TABLET ORAL DAILY
Status: DISCONTINUED | OUTPATIENT
Start: 2020-11-14 | End: 2020-11-14

## 2020-11-14 RX ORDER — 0.9 % SODIUM CHLORIDE 0.9 %
1000 INTRAVENOUS SOLUTION INTRAVENOUS ONCE
Status: COMPLETED | OUTPATIENT
Start: 2020-11-14 | End: 2020-11-14

## 2020-11-14 RX ORDER — MULTIVITAMIN WITH IRON
1 TABLET ORAL DAILY
Status: DISCONTINUED | OUTPATIENT
Start: 2020-11-15 | End: 2020-11-18 | Stop reason: HOSPADM

## 2020-11-14 RX ORDER — LISINOPRIL AND HYDROCHLOROTHIAZIDE 12.5; 1 MG/1; MG/1
1 TABLET ORAL DAILY
Status: DISCONTINUED | OUTPATIENT
Start: 2020-11-14 | End: 2020-11-14

## 2020-11-14 RX ORDER — HYDROXYZINE HYDROCHLORIDE 25 MG/1
25 TABLET, FILM COATED ORAL 4 TIMES DAILY PRN
Status: DISCONTINUED | OUTPATIENT
Start: 2020-11-14 | End: 2020-11-18 | Stop reason: HOSPADM

## 2020-11-14 RX ORDER — PHENOBARBITAL SODIUM 65 MG/ML
260 INJECTION INTRAMUSCULAR
Status: DISCONTINUED | OUTPATIENT
Start: 2020-11-14 | End: 2020-11-15

## 2020-11-14 RX ORDER — FOLIC ACID 1 MG/1
1 TABLET ORAL DAILY
Status: DISCONTINUED | OUTPATIENT
Start: 2020-11-14 | End: 2020-11-14

## 2020-11-14 RX ORDER — MAGNESIUM SULFATE IN WATER 40 MG/ML
2 INJECTION, SOLUTION INTRAVENOUS PRN
Status: DISCONTINUED | OUTPATIENT
Start: 2020-11-14 | End: 2020-11-18 | Stop reason: HOSPADM

## 2020-11-14 RX ORDER — ACETAMINOPHEN 325 MG/1
650 TABLET ORAL EVERY 6 HOURS PRN
Status: DISCONTINUED | OUTPATIENT
Start: 2020-11-14 | End: 2020-11-18 | Stop reason: HOSPADM

## 2020-11-14 RX ORDER — POTASSIUM CHLORIDE 7.45 MG/ML
10 INJECTION INTRAVENOUS PRN
Status: DISCONTINUED | OUTPATIENT
Start: 2020-11-14 | End: 2020-11-18 | Stop reason: HOSPADM

## 2020-11-14 RX ORDER — DESVENLAFAXINE 100 MG/1
100 TABLET, EXTENDED RELEASE ORAL DAILY
Status: DISCONTINUED | OUTPATIENT
Start: 2020-11-14 | End: 2020-11-18 | Stop reason: HOSPADM

## 2020-11-14 RX ORDER — POLYETHYLENE GLYCOL 3350 17 G/17G
17 POWDER, FOR SOLUTION ORAL DAILY PRN
Status: DISCONTINUED | OUTPATIENT
Start: 2020-11-14 | End: 2020-11-18 | Stop reason: HOSPADM

## 2020-11-14 RX ORDER — ACETAMINOPHEN 650 MG/1
650 SUPPOSITORY RECTAL EVERY 6 HOURS PRN
Status: DISCONTINUED | OUTPATIENT
Start: 2020-11-14 | End: 2020-11-18 | Stop reason: HOSPADM

## 2020-11-14 RX ORDER — PANTOPRAZOLE SODIUM 40 MG/1
40 TABLET, DELAYED RELEASE ORAL
Status: DISCONTINUED | OUTPATIENT
Start: 2020-11-14 | End: 2020-11-18 | Stop reason: HOSPADM

## 2020-11-14 RX ORDER — THIAMINE MONONITRATE (VIT B1) 100 MG
100 TABLET ORAL DAILY
Status: DISCONTINUED | OUTPATIENT
Start: 2020-11-15 | End: 2020-11-18 | Stop reason: HOSPADM

## 2020-11-14 RX ORDER — SODIUM CHLORIDE 0.9 % (FLUSH) 0.9 %
10 SYRINGE (ML) INJECTION EVERY 12 HOURS SCHEDULED
Status: DISCONTINUED | OUTPATIENT
Start: 2020-11-14 | End: 2020-11-18 | Stop reason: HOSPADM

## 2020-11-14 RX ORDER — FOLIC ACID 1 MG/1
1 TABLET ORAL DAILY
Status: DISCONTINUED | OUTPATIENT
Start: 2020-11-15 | End: 2020-11-18 | Stop reason: HOSPADM

## 2020-11-14 RX ORDER — PHENOBARBITAL SODIUM 65 MG/ML
260 INJECTION INTRAMUSCULAR
Status: DISCONTINUED | OUTPATIENT
Start: 2020-11-14 | End: 2020-11-14

## 2020-11-14 RX ORDER — MULTIVITAMIN WITH IRON
1 TABLET ORAL DAILY
Status: DISCONTINUED | OUTPATIENT
Start: 2020-11-14 | End: 2020-11-14

## 2020-11-14 RX ORDER — ONDANSETRON 2 MG/ML
4 INJECTION INTRAMUSCULAR; INTRAVENOUS EVERY 6 HOURS PRN
Status: DISCONTINUED | OUTPATIENT
Start: 2020-11-14 | End: 2020-11-18 | Stop reason: HOSPADM

## 2020-11-14 RX ADMIN — SODIUM CHLORIDE, PRESERVATIVE FREE 10 ML: 5 INJECTION INTRAVENOUS at 21:24

## 2020-11-14 RX ADMIN — PHENOBARBITAL SODIUM 130 MG: 65 INJECTION INTRAMUSCULAR; INTRAVENOUS at 17:40

## 2020-11-14 RX ADMIN — FOLIC ACID: 5 INJECTION, SOLUTION INTRAMUSCULAR; INTRAVENOUS; SUBCUTANEOUS at 08:44

## 2020-11-14 RX ADMIN — ENOXAPARIN SODIUM 40 MG: 40 INJECTION SUBCUTANEOUS at 11:01

## 2020-11-14 RX ADMIN — PANTOPRAZOLE SODIUM 40 MG: 40 TABLET, DELAYED RELEASE ORAL at 06:55

## 2020-11-14 RX ADMIN — FLUOXETINE HYDROCHLORIDE 20 MG: 20 CAPSULE ORAL at 13:33

## 2020-11-14 RX ADMIN — DESVENLAFAXINE SUCCINATE 100 MG: 100 TABLET, FILM COATED, EXTENDED RELEASE ORAL at 08:14

## 2020-11-14 RX ADMIN — PHENOBARBITAL SODIUM 130 MG: 65 INJECTION INTRAMUSCULAR; INTRAVENOUS at 13:51

## 2020-11-14 RX ADMIN — ONDANSETRON 4 MG: 2 INJECTION INTRAMUSCULAR; INTRAVENOUS at 23:33

## 2020-11-14 RX ADMIN — ACETAMINOPHEN 650 MG: 325 TABLET ORAL at 07:01

## 2020-11-14 RX ADMIN — PHENOBARBITAL SODIUM 130 MG: 65 INJECTION INTRAMUSCULAR; INTRAVENOUS at 07:58

## 2020-11-14 RX ADMIN — ACETAMINOPHEN 650 MG: 325 TABLET ORAL at 23:33

## 2020-11-14 RX ADMIN — SODIUM CHLORIDE 1000 ML: 9 INJECTION, SOLUTION INTRAVENOUS at 06:55

## 2020-11-14 RX ADMIN — PHENOBARBITAL SODIUM 260 MG: 65 INJECTION INTRAMUSCULAR; INTRAVENOUS at 11:05

## 2020-11-14 ASSESSMENT — PAIN SCALES - GENERAL
PAINLEVEL_OUTOF10: 4
PAINLEVEL_OUTOF10: 3
PAINLEVEL_OUTOF10: 0
PAINLEVEL_OUTOF10: 0

## 2020-11-14 NOTE — PROGRESS NOTES
Utilize [facility] alcohol withdrawal scale (Based on [place] Modified Alcohol Withdrawal Scale). Tabulate score based on classifications including Tremor, Sweating, Hallucination, Orientation, and Agitation. Tremor: 2  Sweatin  Hallucinations: 0  Orientation: 0  Agitation: 0  Total Score: 3    Action perform as described below     Tremor:  No tremor is 0 points. Tremor on movement is 1 point. Tremor at rest is 2 points. Sweating: No Sweat 0 points. Moist is 1 point. Drenching sweats is 2 points. Hallucinations: No present 0 points. Dissuadable is 1 point. Not dissuadable is 2 points. Orientation: Oriented 0 points. Vague/detached 1 point. Disoriented/no contact 2 points. Agitation: Calm 0 points. Anxious 1 point. Panicky 2 points. Check scale every 2 hours. Discontinue scoring with 4 consecutive scorings of 0. Scale 0: No phenobarbital given. Re-assess every 60 minutes as needed. Scale 1-3: Phenobarbital 130 mg IV over 3 minutes. Re-assess every 60 minutes as needed. May administer every 60 minutes to a maximum dose of phenobarbital 1040 mg in 24 hours! Scale 4-8: Phenobarbital 260 mg IV over 5 minutes. Re-assess every 60 minutes as needed. May administer every 60 minutes to a maximum dose of phenobarbital 1040mg in 24 hours! Scale 9-10: Transfer to ICU (if not already in ICU). Administer 10mg/kg phenobarbital IV over 60 minutes. Maximum dose phenobarbital is 1040mg in 24 hours!

## 2020-11-14 NOTE — H&P
Per HPI  SHX:  Former smoker, 1ppd smoker on and off since she was 15. Drug use: marijuana, hx of xanax overdose. See HPI for EtOH use. FHX: Father: EtOH abuse, schizophrenia, CMP. Mother: Breast CA, Siblings: MI  Allergies: Wellbutrin (anaphylaxis)  Medications:       sodium chloride flush  10 mL Intravenous 2 times per day    enoxaparin  40 mg Subcutaneous Daily    thiamine  100 mg Oral Daily    multivitamin  1 tablet Oral Daily    desvenlafaxine succinate  100 mg Oral Daily    FLUoxetine  20 mg Oral Daily    pantoprazole  40 mg Oral QAM AC    lisinopril-hydroCHLOROthiazide  1 tablet Oral Daily    folic acid  1 mg Oral Daily       Vital Signs:   BP (!) 159/107   Pulse 99   Temp 98.2 °F (36.8 °C) (Oral)   Ht 5' 6\" (1.676 m)   BMI 32.22 kg/m²    No intake or output data in the 24 hours ending 11/14/20 0534     General:  White female well groomed, well-nourished, well-developed who appears stated age, in no acute distress lying in bed. Head: Normocephalic and atraumatic. EENT: No exophthalmos noted. No scleral or conjunctiva icterus, injection or pallor noted. Neck: Supple. Trachea midline. No thyromegaly. Thorax/Lungs: Thorax is symmetrical with good expansion. Breath sounds CTA and equal b/l without rales, wheezing, or rhonchi. No retractions or use of abdominal muscles. Cardiac: S1, S2, RRR without murmur, rub, or gallop. No JVD  Abdomen: Abdomen soft, nontender to palpation, without guarding or rigidity. Hypoactive BS. Peripheral Vasculature: Extremities warm, dry without edema, no varicosities or stasis changes, DP pulses 2+ b/l. Brisk capillary refill. Skin:  Skin warm and diaphoretic. No lesions, rash. Psych:  Alert and oriented x3. Affect appropriate  Neurologic: No focal deficits. No Seizures. Data:   Labs: Pending.      EKG:  Reviewed by me: pending    Case and Plan discussed with Chano Montez MD  Electronically signed by Rosario Robles on 11/14/2020 at 5:34 AM

## 2020-11-14 NOTE — PROGRESS NOTES
Utilize [facility] alcohol withdrawal scale (Based on [place] Modified Alcohol Withdrawal Scale). Tabulate score based on classifications including Tremor, Sweating, Hallucination, Orientation, and Agitation.     Tremor: 2  Sweatin  Hallucinations: 0  Orientation: 0  Agitation: 1  Total Score: 3     Action perform as described below      Tremor:  No tremor is 0 points.  Tremor on movement is 1 point.  Tremor at rest is 2 points. Sweating: No Sweat 0 points. Moist is 1 point.  Drenching sweats is 2 points. Hallucinations: No present 0 points. Dissuadable is 1 point. Not dissuadable is 2 points. Orientation: Oriented 0 points. Vague/detached 1 point. Disoriented/no contact 2 points. Agitation: Calm 0 points.  Anxious 1 point. Panicky 2 points.     Check scale every 2 hours.  Discontinue scoring with 4 consecutive scorings of 0. Scale 0: No phenobarbital given.  Re-assess every 60 minutes as needed. Scale 1-3: Phenobarbital 130 mg IV over 3 minutes. Re-assess every 60 minutes as needed.  May administer every 60 minutes to a maximum dose of phenobarbital 1040 mg in 24 hours! Scale 4-8: Phenobarbital 260 mg IV over 5 minutes.  Re-assess every 60 minutes as needed. May administer every 60 minutes to a maximum dose of phenobarbital 1040mg in 24 hours! Scale 9-10: Transfer to ICU (if not already in ICU).  Administer 10mg/kg phenobarbital IV over 60 minutes.  Maximum dose phenobarbital is 1040mg in 24 hours!

## 2020-11-14 NOTE — PROGRESS NOTES
Utilize [facility] alcohol withdrawal scale (Based on [place] Modified Alcohol Withdrawal Scale). Tabulate score based on classifications including Tremor, Sweating, Hallucination, Orientation, and Agitation.     Tremor: 2  Sweatin  Hallucinations: 0  Orientation: 0  Agitation: 0  Total Score: 3     Action perform as described below      Tremor:  No tremor is 0 points.  Tremor on movement is 1 point.  Tremor at rest is 2 points. Sweating: No Sweat 0 points. Moist is 1 point.  Drenching sweats is 2 points. Hallucinations: No present 0 points. Dissuadable is 1 point. Not dissuadable is 2 points. Orientation: Oriented 0 points. Vague/detached 1 point. Disoriented/no contact 2 points. Agitation: Calm 0 points.  Anxious 1 point. Panicky 2 points.     Check scale every 2 hours.  Discontinue scoring with 4 consecutive scorings of 0. Scale 0: No phenobarbital given.  Re-assess every 60 minutes as needed. Scale 1-3: Phenobarbital 130 mg IV over 3 minutes. Re-assess every 60 minutes as needed.  May administer every 60 minutes to a maximum dose of phenobarbital 1040 mg in 24 hours! Scale 4-8: Phenobarbital 260 mg IV over 5 minutes.  Re-assess every 60 minutes as needed. May administer every 60 minutes to a maximum dose of phenobarbital 1040mg in 24 hours! Scale 9-10: Transfer to ICU (if not already in ICU).  Administer 10mg/kg phenobarbital IV over 60 minutes.  Maximum dose phenobarbital is 1040mg in 24 hours!

## 2020-11-14 NOTE — PROGRESS NOTES
Utilize [facility] alcohol withdrawal scale (Based on [place] Modified Alcohol Withdrawal Scale). Tabulate score based on classifications including Tremor, Sweating, Hallucination, Orientation, and Agitation.     Tremor: 2  Sweatin  Hallucinations: 0  Orientation: 0  Agitation: 0  Total Score: 4     Action perform as described below      Tremor:  No tremor is 0 points. Tremor on movement is 1 point. Tremor at rest is 2 points. Sweating: No Sweat 0 points. Moist is 1 point. Drenching sweats is 2 points. Hallucinations: No present 0 points. Dissuadable is 1 point. Not dissuadable is 2 points. Orientation: Oriented 0 points. Vague/detached 1 point. Disoriented/no contact 2 points. Agitation: Calm 0 points. Anxious 1 point. Panicky 2 points.     Check scale every 2 hours. Discontinue scoring with 4 consecutive scorings of 0. Scale 0: No phenobarbital given. Re-assess every 60 minutes as needed. Scale 1-3: Phenobarbital 130 mg IV over 3 minutes. Re-assess every 60 minutes as needed. May administer every 60 minutes to a maximum dose of phenobarbital 1040 mg in 24 hours! Scale 4-8: Phenobarbital 260 mg IV over 5 minutes. Re-assess every 60 minutes as needed. May administer every 60 minutes to a maximum dose of phenobarbital 1040mg in 24 hours! Scale 9-10: Transfer to ICU (if not already in ICU). Administer 10mg/kg phenobarbital IV over 60 minutes.   Maximum dose phenobarbital is 1040mg in 24 hours!

## 2020-11-15 LAB
ANION GAP SERPL CALCULATED.3IONS-SCNC: 12 MEQ/L (ref 8–16)
BUN BLDV-MCNC: 3 MG/DL (ref 7–22)
CALCIUM SERPL-MCNC: 8.4 MG/DL (ref 8.5–10.5)
CHLORIDE BLD-SCNC: 105 MEQ/L (ref 98–111)
CO2: 25 MEQ/L (ref 23–33)
CREAT SERPL-MCNC: 0.5 MG/DL (ref 0.4–1.2)
ERYTHROCYTE [DISTWIDTH] IN BLOOD BY AUTOMATED COUNT: 11 % (ref 11.5–14.5)
ERYTHROCYTE [DISTWIDTH] IN BLOOD BY AUTOMATED COUNT: 38.3 FL (ref 35–45)
GFR SERPL CREATININE-BSD FRML MDRD: > 90 ML/MIN/1.73M2
GLUCOSE BLD-MCNC: 77 MG/DL (ref 70–108)
HCT VFR BLD CALC: 36.5 % (ref 37–47)
HEMOGLOBIN: 12.4 GM/DL (ref 12–16)
MAGNESIUM: 2 MG/DL (ref 1.6–2.4)
MCH RBC QN AUTO: 32.5 PG (ref 26–33)
MCHC RBC AUTO-ENTMCNC: 34 GM/DL (ref 32.2–35.5)
MCV RBC AUTO: 95.8 FL (ref 81–99)
PLATELET # BLD: 206 THOU/MM3 (ref 130–400)
PMV BLD AUTO: 9.9 FL (ref 9.4–12.4)
POTASSIUM SERPL-SCNC: 3.3 MEQ/L (ref 3.5–5.2)
RBC # BLD: 3.81 MILL/MM3 (ref 4.2–5.4)
SODIUM BLD-SCNC: 142 MEQ/L (ref 135–145)
WBC # BLD: 3.8 THOU/MM3 (ref 4.8–10.8)

## 2020-11-15 PROCEDURE — 2580000003 HC RX 258: Performed by: PHYSICIAN ASSISTANT

## 2020-11-15 PROCEDURE — 2580000003 HC RX 258: Performed by: INTERNAL MEDICINE

## 2020-11-15 PROCEDURE — 6360000002 HC RX W HCPCS: Performed by: PHYSICIAN ASSISTANT

## 2020-11-15 PROCEDURE — 6360000002 HC RX W HCPCS: Performed by: INTERNAL MEDICINE

## 2020-11-15 PROCEDURE — 36415 COLL VENOUS BLD VENIPUNCTURE: CPT

## 2020-11-15 PROCEDURE — 83735 ASSAY OF MAGNESIUM: CPT

## 2020-11-15 PROCEDURE — 6370000000 HC RX 637 (ALT 250 FOR IP): Performed by: PHYSICIAN ASSISTANT

## 2020-11-15 PROCEDURE — 99233 SBSQ HOSP IP/OBS HIGH 50: CPT | Performed by: INTERNAL MEDICINE

## 2020-11-15 PROCEDURE — 80048 BASIC METABOLIC PNL TOTAL CA: CPT

## 2020-11-15 PROCEDURE — 1200000003 HC TELEMETRY R&B

## 2020-11-15 PROCEDURE — 85027 COMPLETE CBC AUTOMATED: CPT

## 2020-11-15 RX ORDER — PHENOBARBITAL 32.4 MG/1
32.4 TABLET ORAL 2 TIMES DAILY
Status: COMPLETED | OUTPATIENT
Start: 2020-11-16 | End: 2020-11-17

## 2020-11-15 RX ORDER — PHENOBARBITAL 32.4 MG/1
64.8 TABLET ORAL 2 TIMES DAILY
Status: COMPLETED | OUTPATIENT
Start: 2020-11-16 | End: 2020-11-16

## 2020-11-15 RX ADMIN — THERA TABS 1 TABLET: TAB at 08:49

## 2020-11-15 RX ADMIN — Medication 100 MG: at 08:49

## 2020-11-15 RX ADMIN — SODIUM CHLORIDE, PRESERVATIVE FREE 10 ML: 5 INJECTION INTRAVENOUS at 21:32

## 2020-11-15 RX ADMIN — PHENOBARBITAL SODIUM 237.25 MG: 65 INJECTION INTRAMUSCULAR; INTRAVENOUS at 13:06

## 2020-11-15 RX ADMIN — FLUOXETINE HYDROCHLORIDE 20 MG: 20 CAPSULE ORAL at 08:49

## 2020-11-15 RX ADMIN — PHENOBARBITAL SODIUM 237.25 MG: 65 INJECTION INTRAMUSCULAR; INTRAVENOUS at 21:32

## 2020-11-15 RX ADMIN — SODIUM CHLORIDE, PRESERVATIVE FREE 10 ML: 5 INJECTION INTRAVENOUS at 08:50

## 2020-11-15 RX ADMIN — ENOXAPARIN SODIUM 40 MG: 40 INJECTION SUBCUTANEOUS at 08:51

## 2020-11-15 RX ADMIN — LISINOPRIL 10 MG: 10 TABLET ORAL at 12:03

## 2020-11-15 RX ADMIN — PHENOBARBITAL SODIUM 130 MG: 65 INJECTION INTRAMUSCULAR; INTRAVENOUS at 02:25

## 2020-11-15 RX ADMIN — FOLIC ACID 1 MG: 1 TABLET ORAL at 09:00

## 2020-11-15 RX ADMIN — PANTOPRAZOLE SODIUM 40 MG: 40 TABLET, DELAYED RELEASE ORAL at 06:43

## 2020-11-15 RX ADMIN — HYDROXYZINE HYDROCHLORIDE 25 MG: 25 TABLET ORAL at 08:49

## 2020-11-15 RX ADMIN — DESVENLAFAXINE SUCCINATE 100 MG: 100 TABLET, FILM COATED, EXTENDED RELEASE ORAL at 08:51

## 2020-11-15 RX ADMIN — PHENOBARBITAL SODIUM 237.25 MG: 65 INJECTION INTRAMUSCULAR; INTRAVENOUS at 16:01

## 2020-11-15 ASSESSMENT — PAIN SCALES - GENERAL
PAINLEVEL_OUTOF10: 0

## 2020-11-15 NOTE — PROGRESS NOTES
Utilize [facility] alcohol withdrawal scale (Based on [place] Modified Alcohol Withdrawal Scale). Tabulate score based on classifications including Tremor, Sweating, Hallucination, Orientation, and Agitation.     Tremor: 2  Sweatin  Hallucinations: 0  Orientation: 0  Agitation: 1  Total Score: 3     Action perform as described below      Tremor:  No tremor is 0 points.  Tremor on movement is 1 point.  Tremor at rest is 2 points. Sweating: No Sweat 0 points. Moist is 1 point.  Drenching sweats is 2 points. Hallucinations: No present 0 points. Dissuadable is 1 point. Not dissuadable is 2 points. Orientation: Oriented 0 points. Vague/detached 1 point. Disoriented/no contact 2 points. Agitation: Calm 0 points.  Anxious 1 point. Panicky 2 points.     Check scale every 2 hours.  Discontinue scoring with 4 consecutive scorings of 0. Scale 0: No phenobarbital given.  Re-assess every 60 minutes as needed. Scale 1-3: Phenobarbital 130 mg IV over 3 minutes. Re-assess every 60 minutes as needed.  May administer every 60 minutes to a maximum dose of phenobarbital 1040 mg in 24 hours! Scale 4-8: Phenobarbital 260 mg IV over 5 minutes.  Re-assess every 60 minutes as needed. May administer every 60 minutes to a maximum dose of phenobarbital 1040mg in 24 hours! Scale 9-10: Transfer to ICU (if not already in ICU).  Administer 10mg/kg phenobarbital IV over 60 minutes.  Maximum dose phenobarbital is 1040mg in 24 hours!

## 2020-11-15 NOTE — PROGRESS NOTES
Utilize [facility] alcohol withdrawal scale (Based on [place] Modified Alcohol Withdrawal Scale). Tabulate score based on classifications including Tremor, Sweating, Hallucination, Orientation, and Agitation.     Tremor: 2  Sweatin  Hallucinations: 0  Orientation: 0  Agitation: 1  Total Score: 3     Action perform as described below      Tremor:  No tremor is 0 points.  Tremor on movement is 1 point.  Tremor at rest is 2 points. Sweating: No Sweat 0 points. Moist is 1 point.  Drenching sweats is 2 points. Hallucinations: No present 0 points. Dissuadable is 1 point. Not dissuadable is 2 points. Orientation: Oriented 0 points. Vague/detached 1 point. Disoriented/no contact 2 points. Agitation: Calm 0 points.  Anxious 1 point. Panicky 2 points.     Check scale every 2 hours.  Discontinue scoring with 4 consecutive scorings of 0. Scale 0: No phenobarbital given.  Re-assess every 60 minutes as needed. Scale 1-3: Phenobarbital 130 mg IV over 3 minutes. Re-assess every 60 minutes as needed.  May administer every 60 minutes to a maximum dose of phenobarbital 1040 mg in 24 hours! Scale 4-8: Phenobarbital 260 mg IV over 5 minutes.  Re-assess every 60 minutes as needed. May administer every 60 minutes to a maximum dose of phenobarbital 1040mg in 24 hours! Scale 9-10: Transfer to ICU (if not already in ICU).  Administer 10mg/kg phenobarbital IV over 60 minutes.  Maximum dose phenobarbital is 1040mg in 24 hours!

## 2020-11-15 NOTE — PROGRESS NOTES
PHENobarbital **OR** PHENobarbital IVPB, potassium chloride, magnesium sulfate      Intake/Output Summary (Last 24 hours) at 11/15/2020 0828  Last data filed at 11/15/2020 0641  Gross per 24 hour   Intake 2405.85 ml   Output 1500 ml   Net 905.85 ml     Weight change:       Exam:  /70   Pulse 86   Temp 98.3 °F (36.8 °C) (Oral)   Resp 18   Ht 5' 6\" (1.676 m)   Wt 199 lb 9.6 oz (90.5 kg)   SpO2 97%   BMI 32.22 kg/m²     General appearance: mild distress, well developed, appears stated age. Tearful  Eyes:  Pupils equal, round, and reactive to light. Conjunctivae/corneas clear. HENT: Head normal in appearance. External nares normal.  Oral mucosa moist without lesions. Hearing grossly intact. Neck: Supple, with full range of motion. No jugular venous distention. Trachea midline. Respiratory:  Normal respiratory effort. Clear to auscultation, bilaterally without rales or wheezes or Rhonchi. Cardiovascular: Normal rate, regular rhythm with normal S1/S2 without murmurs. No lower extremity edema. Abdomen: Soft, non-tender, non-distended with normal bowel sounds. Musculoskeletal: Normal range of motion in extremities. There is no joint swelling or tenderness. Skin: Skin color, texture, turgor normal.  No rashes or lesions. Neurologic:  Neurovascularly intact without any focal sensory/motor deficits in the upper and lower extremities. Cranial nerves:  grossly non-focal.  Mild tremor in fingers. Psychiatric: Alert and oriented, thought content appropriate, normal insight. Depressed mood and tearful  Capillary Refill: Brisk,< 3 seconds. Peripheral Pulses: +2 palpable, equal bilaterally.       Labs:   Recent Labs     11/14/20  0504 11/15/20  0624   WBC 4.6* 3.8*   HGB 12.9 12.4   HCT 38.6 36.5*    206     Recent Labs     11/14/20  0504      K 3.2*      CO2 23   BUN 7   CREATININE 0.6   CALCIUM 9.2     Recent Labs     11/14/20  0504   AST 89*   ALT 79*   BILITOT 0.8   ALKPHOS 78 No results for input(s): INR in the last 72 hours. No results for input(s): Mirta Poeens in the last 72 hours. Microbiology:      Urinalysis:      Lab Results   Component Value Date    NITRU NEGATIVE 11/14/2020    WBCUA 0-2 11/14/2020    BACTERIA NONE SEEN 11/14/2020    RBCUA 0-2 11/14/2020    BLOODU MODERATE 11/14/2020    SPECGRAV 1.009 11/14/2020       Radiology:  US LIVER   Final Result   Normal liver ultrasound. **This report has been created using voice recognition software. It may contain minor errors which are inherent in voice recognition technology. **      Final report electronically signed by Dr. Norberto Mckeon on 11/14/2020 7:27 AM          DVT prophylaxis: [x] Lovenox                                  [] SCDs                                 [] SQ Heparin                                 [] Encourage ambulation           [] Already on Anticoagulation     Code Status: Full Code    PT/OT Eval Status:     Diet:   DIET GENERAL;    Fluids: na    Tele:   [x] yes             [] no      Electronically signed by Mendoza Smith DO on 11/15/2020 at 8:28 AM

## 2020-11-16 LAB
ALBUMIN SERPL-MCNC: 3.8 G/DL (ref 3.5–5.1)
ALP BLD-CCNC: 69 U/L (ref 38–126)
ALT SERPL-CCNC: 72 U/L (ref 11–66)
ANION GAP SERPL CALCULATED.3IONS-SCNC: 15 MEQ/L (ref 8–16)
AST SERPL-CCNC: 82 U/L (ref 5–40)
BILIRUB SERPL-MCNC: 0.4 MG/DL (ref 0.3–1.2)
BUN BLDV-MCNC: 3 MG/DL (ref 7–22)
CALCIUM SERPL-MCNC: 8.5 MG/DL (ref 8.5–10.5)
CHLORIDE BLD-SCNC: 105 MEQ/L (ref 98–111)
CO2: 19 MEQ/L (ref 23–33)
CREAT SERPL-MCNC: 0.6 MG/DL (ref 0.4–1.2)
GFR SERPL CREATININE-BSD FRML MDRD: > 90 ML/MIN/1.73M2
GLUCOSE BLD-MCNC: 140 MG/DL (ref 70–108)
MAGNESIUM: 2.1 MG/DL (ref 1.6–2.4)
POTASSIUM SERPL-SCNC: 3.5 MEQ/L (ref 3.5–5.2)
REASON FOR REJECTION: NORMAL
REJECTED TEST: NORMAL
SODIUM BLD-SCNC: 139 MEQ/L (ref 135–145)
TOTAL PROTEIN: 5.6 G/DL (ref 6.1–8)

## 2020-11-16 PROCEDURE — 2580000003 HC RX 258: Performed by: PHYSICIAN ASSISTANT

## 2020-11-16 PROCEDURE — 1200000003 HC TELEMETRY R&B

## 2020-11-16 PROCEDURE — 6370000000 HC RX 637 (ALT 250 FOR IP): Performed by: PHYSICIAN ASSISTANT

## 2020-11-16 PROCEDURE — 36415 COLL VENOUS BLD VENIPUNCTURE: CPT

## 2020-11-16 PROCEDURE — 99232 SBSQ HOSP IP/OBS MODERATE 35: CPT | Performed by: INTERNAL MEDICINE

## 2020-11-16 PROCEDURE — 6360000002 HC RX W HCPCS: Performed by: PHYSICIAN ASSISTANT

## 2020-11-16 PROCEDURE — 80053 COMPREHEN METABOLIC PANEL: CPT

## 2020-11-16 PROCEDURE — 83735 ASSAY OF MAGNESIUM: CPT

## 2020-11-16 PROCEDURE — 6370000000 HC RX 637 (ALT 250 FOR IP): Performed by: INTERNAL MEDICINE

## 2020-11-16 PROCEDURE — 90792 PSYCH DIAG EVAL W/MED SRVCS: CPT | Performed by: PSYCHIATRY & NEUROLOGY

## 2020-11-16 RX ADMIN — THERA TABS 1 TABLET: TAB at 07:59

## 2020-11-16 RX ADMIN — ENOXAPARIN SODIUM 40 MG: 40 INJECTION SUBCUTANEOUS at 07:59

## 2020-11-16 RX ADMIN — SODIUM CHLORIDE, PRESERVATIVE FREE 10 ML: 5 INJECTION INTRAVENOUS at 08:01

## 2020-11-16 RX ADMIN — PHENOBARBITAL 64.8 MG: 32.4 TABLET ORAL at 07:59

## 2020-11-16 RX ADMIN — DESVENLAFAXINE SUCCINATE 100 MG: 100 TABLET, FILM COATED, EXTENDED RELEASE ORAL at 07:59

## 2020-11-16 RX ADMIN — PANTOPRAZOLE SODIUM 40 MG: 40 TABLET, DELAYED RELEASE ORAL at 06:19

## 2020-11-16 RX ADMIN — SODIUM CHLORIDE, PRESERVATIVE FREE 10 ML: 5 INJECTION INTRAVENOUS at 22:09

## 2020-11-16 RX ADMIN — POLYETHYLENE GLYCOL 3350 17 G: 17 POWDER, FOR SOLUTION ORAL at 22:09

## 2020-11-16 RX ADMIN — FLUOXETINE HYDROCHLORIDE 20 MG: 20 CAPSULE ORAL at 07:59

## 2020-11-16 RX ADMIN — LISINOPRIL 10 MG: 10 TABLET ORAL at 07:59

## 2020-11-16 RX ADMIN — PHENOBARBITAL 64.8 MG: 32.4 TABLET ORAL at 02:15

## 2020-11-16 RX ADMIN — FOLIC ACID 1 MG: 1 TABLET ORAL at 07:59

## 2020-11-16 RX ADMIN — PHENOBARBITAL 32.4 MG: 32.4 TABLET ORAL at 21:11

## 2020-11-16 RX ADMIN — Medication 100 MG: at 07:59

## 2020-11-16 ASSESSMENT — PAIN SCALES - GENERAL
PAINLEVEL_OUTOF10: 0

## 2020-11-16 ASSESSMENT — PATIENT HEALTH QUESTIONNAIRE - PHQ9: SUM OF ALL RESPONSES TO PHQ QUESTIONS 1-9: 13

## 2020-11-16 NOTE — CARE COORDINATION
11/16/20, 10:39 AM EST    DISCHARGE PLANNING EVALUATION    SW consult received for consideration for rehab. SW spoke with Patient and Patient was seen by addiction services. SW reviewed resources with Patient and she denied any questions at this time. Patient stated that she would review information. Patient does see Dr. Cecilia Lim at [facility]. Additional information provided to Patient regarding outpatient treatment.

## 2020-11-16 NOTE — CARE COORDINATION
DISASTER CHARTING    11/16/20, 1:18 PM EST    DISCHARGE ONGOING EVALUATION:     [facility] day: 2  Location: [facility]27/027-A Reason for admit: Alcohol use with withdrawal [F10.939]   Barriers to Discharge: Day 3 of detox, on phenobarb protocol. PCP: Roberta Ghotra MD  Patient Goals/Plan/Treatment Preferences: Plans return home, has resources, and follows Dr. Junior Albert at [facility].
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
## 2020-11-16 NOTE — CONSULTS
Brief Intervention and Referral to Treatment Summary    Patient was provided PHQ-9, AUDIT and DAST Screening:      PHQ-9 Score: n/a  AUDIT Score:  28  DAST Score:  15    Patients substance use is considered     Dependent    Patients depression is considered: Moderate    Brief Education Was Provided    Patient was receptive    Brief Intervention Is Provided (Only for AUDIT or DAST)     Patient reports readiness to decrease and/or stop use and a plan was discussed     Recommendations/Referrals for Brief and/or Specialized Treatment Provided to Patient     Pt reports drinking 5 alexis or so shots of EverClear a night. Endorsed feeling depressed and passive suicidal ideation. Currently on psych meds by PCP, was seeing Dr. Brenton Apgar in [place]. Resources provided.

## 2020-11-16 NOTE — PLAN OF CARE
Problem: DISCHARGE BARRIERS  Goal: Patient's continuum of care needs are met  Outcome: Ongoing  Note: Patient return home at discharge, resources provided. See  notes 11/16/2020.

## 2020-11-16 NOTE — PROGRESS NOTES
Hospitalist Progress Note    Patient:  Hiral Melgar      Unit/Bed:6K-27/027-A    YOB: 1980    MRN: [id number]       Acct: [de-identified]     PCP: Abe Mortimer, MD    Date of Admission: 11/14/2020      Assessment/Plan:  Active Hospital Problems    Diagnosis Date Noted    Alcohol use with withdrawal [F10.939] 11/14/2020       1. Acute EtOH Withdrawal: Last drink 17:30 11/13/20. 4-5 glasses of everclear. EtOH level neg on arrival.  No prior seizure/hallucinations. Never been hospitalized. Reports prior tremors. a. PAWSS score 7 high risk for complicated withdrawal.   b. Reports mild auditory hallucinations overnight x1  c. Pheno prn stopped, started on pheno taper  d. Fall/seizure precautions, daily thiamine / MTV / folate  e. Addiction services, psych  f. She states that she feels as if she is doing much better than yesterday. We will continue phenobarb taper  2. Mild Hypokalemia: likely 2/2 diarrhea, poor intake. Replacement ordered. Monitor Mag.   3. Mild hepatocellular transaminitis (AST 89, ALT 79): likely 2/2 EtOH use. Normal Liver US. Trend. 4. Mild Leukocytopenia, persistent (WBC 4.6):  ? 2/2 EtOH. No fevers or sx of URI/UTI. No lymphopenia  5. Essential Hypertension, uncontrolled: hold HCTZ for volume resuscitiation, Resume ACEi. 6. Bipolar, unclear type: resume prozac and desvenlafaxine (unclear why on SSRI and SNRI). Psych is consulted. Has seen Dr. Todd Rosneberg in past.    a. Seems more depressed at this time. 7. Hx of Suicide attempts: in 2010 per patient. Denies SI/HI currently. 8. Obesity: BMI 32.  on weight loss.        Expected discharge date:  tbd    Disposition: tbd         [] Home       [] TCU       [] Rehab       [] Psych       [] SNF       [] [place]       [] Other-    --------------------------------------------    Chief Complaint: EtOH withdrawal    Hospital Course: Per HPI, \"Hattie Blakely is a 36year old white female former smoker with PMH of HTN, Bipolar who presented to [facility] via direct transfer from [facility] for EtOH withdrawal on 11/14/20. Last drink 17:30 11/13/20, she drinks 4-5 glasses of Everclear daily for the past 2 years but has been drinking since she was 12. She has been to an [place] [address] meeting once. Never been hospitalized for withdrawal, she blacks out when she drinks. She states she starts to withdrawal every morning with HA, diaphoresis and then will drink. No hx of seizures/hallucinations. States she woke up on the kitchen floor a few days ago and had urinated, but she was home alone so she is unsure if she had a seizure. Associated current N/D, HA, diaphoresis, chills. Denies vomiting.      On arrival vitals showed: HR 99, /107, RR 14, and afebrile. k 3.2, mg 1.8, AST 89, ALT 79, etoh neg, wbc 4.6\"    Subjective (past 24 hours): Today she denies any tremors and feels as if she is doing much better compared to yesterday. She has an anxious affect during her interview. Denies any hallucinations at this time.     Medications:  Reviewed    Infusion Medications   Scheduled Medications    PHENobarbital  32.4 mg Oral BID    sodium chloride flush  10 mL Intravenous 2 times per day    enoxaparin  40 mg Subcutaneous Daily    desvenlafaxine succinate  100 mg Oral Daily    FLUoxetine  20 mg Oral Daily    pantoprazole  40 mg Oral QAM AC    lisinopril  10 mg Oral Daily    And    [Held by provider] hydroCHLOROthiazide  12.5 mg Oral Daily    thiamine  100 mg Oral Daily    multivitamin  1 tablet Oral Daily    folic acid  1 mg Oral Daily     PRN Meds: sodium chloride flush, acetaminophen **OR** acetaminophen, polyethylene glycol, promethazine **OR** ondansetron, hydrOXYzine, potassium chloride, magnesium sulfate      Intake/Output Summary (Last 24 hours) at 11/16/2020 1637  Last data filed at 11/16/2020 1543  Gross per 24 hour   Intake 1047.37 ml   Output 0 ml   Net 1047.37 ml     Weight change:       Exam:  BP (!) 121/58   Pulse 92   Temp 98.3 °F (36.8 °C) (Oral)   Resp 16   Ht 5' 6\" (1.676 m)   Wt 202 lb 3.2 oz (91.7 kg)   SpO2 99%   BMI 32.64 kg/m²     General appearance: mild distress, well developed, appears stated age. Tearful  Eyes:  Pupils equal, round, and reactive to light. Conjunctivae/corneas clear. HENT: Head normal in appearance. External nares normal.  Oral mucosa moist without lesions. Hearing grossly intact. Neck: Supple, with full range of motion. No jugular venous distention. Trachea midline. Respiratory:  Normal respiratory effort. Clear to auscultation, bilaterally without rales or wheezes or Rhonchi. Cardiovascular: Normal rate, regular rhythm with normal S1/S2 without murmurs. No lower extremity edema. Abdomen: Soft, non-tender, non-distended with normal bowel sounds. Musculoskeletal: Normal range of motion in extremities. There is no joint swelling or tenderness. Skin: Skin color, texture, turgor normal.  No rashes or lesions. Neurologic:  Neurovascularly intact without any focal sensory/motor deficits in the upper and lower extremities. Cranial nerves:  grossly non-focal.  Mild tremor in fingers. Psychiatric: Alert and oriented, thought content appropriate, normal insight. Appears to have an anxious affect  Capillary Refill: Brisk,< 3 seconds. Peripheral Pulses: +2 palpable, equal bilaterally. Labs:   Recent Labs     11/14/20  0504 11/15/20  0624   WBC 4.6* 3.8*   HGB 12.9 12.4   HCT 38.6 36.5*    206     Recent Labs     11/14/20  0504 11/15/20  0624 11/16/20  0919    142 139   K 3.2* 3.3* 3.5    105 105   CO2 23 25 19*   BUN 7 3* 3*   CREATININE 0.6 0.5 0.6   CALCIUM 9.2 8.4* 8.5     Recent Labs     11/14/20  0504 11/16/20  0919   AST 89* 82*   ALT 79* 72*   BILITOT 0.8 0.4   ALKPHOS 78 69     No results for input(s): INR in the last 72 hours. No results for input(s): Vikash Tone in the last 72 hours.     Microbiology:      Urinalysis:      Lab Results Component Value Date    NITRU NEGATIVE 11/14/2020    WBCUA 0-2 11/14/2020    BACTERIA NONE SEEN 11/14/2020    RBCUA 0-2 11/14/2020    BLOODU MODERATE 11/14/2020    SPECGRAV 1.009 11/14/2020       Radiology:  US LIVER   Final Result   Normal liver ultrasound. **This report has been created using voice recognition software. It may contain minor errors which are inherent in voice recognition technology. **      Final report electronically signed by Dr. Luzmaria Mcnair on 11/14/2020 7:27 AM          DVT prophylaxis: [x] Lovenox                                  [] SCDs                                 [] SQ Heparin                                 [] Encourage ambulation           [] Already on Anticoagulation     Code Status: Full Code    PT/OT Eval Status:     Diet:   DIET GENERAL;    Fluids: na    Tele:   [x] yes             [] no      Electronically signed by Cody Sheikh MD on 11/16/2020 at 4:37 PM

## 2020-11-17 PROCEDURE — 6370000000 HC RX 637 (ALT 250 FOR IP): Performed by: PHYSICIAN ASSISTANT

## 2020-11-17 PROCEDURE — 6370000000 HC RX 637 (ALT 250 FOR IP): Performed by: INTERNAL MEDICINE

## 2020-11-17 PROCEDURE — 6360000002 HC RX W HCPCS: Performed by: PHYSICIAN ASSISTANT

## 2020-11-17 PROCEDURE — 1200000003 HC TELEMETRY R&B

## 2020-11-17 PROCEDURE — 2580000003 HC RX 258: Performed by: PHYSICIAN ASSISTANT

## 2020-11-17 PROCEDURE — 99232 SBSQ HOSP IP/OBS MODERATE 35: CPT | Performed by: INTERNAL MEDICINE

## 2020-11-17 RX ORDER — TRAZODONE HYDROCHLORIDE 50 MG/1
50 TABLET ORAL NIGHTLY
Status: DISCONTINUED | OUTPATIENT
Start: 2020-11-17 | End: 2020-11-18 | Stop reason: HOSPADM

## 2020-11-17 RX ADMIN — FLUOXETINE HYDROCHLORIDE 20 MG: 20 CAPSULE ORAL at 09:57

## 2020-11-17 RX ADMIN — ENOXAPARIN SODIUM 40 MG: 40 INJECTION SUBCUTANEOUS at 09:58

## 2020-11-17 RX ADMIN — PHENOBARBITAL 32.4 MG: 32.4 TABLET ORAL at 20:54

## 2020-11-17 RX ADMIN — DESVENLAFAXINE SUCCINATE 100 MG: 100 TABLET, FILM COATED, EXTENDED RELEASE ORAL at 09:57

## 2020-11-17 RX ADMIN — FOLIC ACID 1 MG: 1 TABLET ORAL at 09:57

## 2020-11-17 RX ADMIN — PANTOPRAZOLE SODIUM 40 MG: 40 TABLET, DELAYED RELEASE ORAL at 06:20

## 2020-11-17 RX ADMIN — SODIUM CHLORIDE, PRESERVATIVE FREE 10 ML: 5 INJECTION INTRAVENOUS at 20:56

## 2020-11-17 RX ADMIN — THERA TABS 1 TABLET: TAB at 09:57

## 2020-11-17 RX ADMIN — POLYETHYLENE GLYCOL 3350 17 G: 17 POWDER, FOR SOLUTION ORAL at 20:55

## 2020-11-17 RX ADMIN — HYDROXYZINE HYDROCHLORIDE 25 MG: 25 TABLET ORAL at 03:45

## 2020-11-17 RX ADMIN — LISINOPRIL 10 MG: 10 TABLET ORAL at 09:57

## 2020-11-17 RX ADMIN — SODIUM CHLORIDE, PRESERVATIVE FREE 10 ML: 5 INJECTION INTRAVENOUS at 09:58

## 2020-11-17 RX ADMIN — Medication 100 MG: at 09:57

## 2020-11-17 RX ADMIN — TRAZODONE HYDROCHLORIDE 50 MG: 50 TABLET ORAL at 23:36

## 2020-11-17 RX ADMIN — PHENOBARBITAL 32.4 MG: 32.4 TABLET ORAL at 09:57

## 2020-11-17 ASSESSMENT — PAIN SCALES - GENERAL
PAINLEVEL_OUTOF10: 0

## 2020-11-17 NOTE — CARE COORDINATION
11/17/20, 1:46 PM EST    Patient goals/plan/ treatment preferences discussed by  and . Patient goals/plan/ treatment preferences reviewed with patient/ family. Patient/ family verbalize understanding of discharge plan and are in agreement with goal/plan/treatment preferences. Understanding was demonstrated using the teach back method. AVS provided by RN at time of discharge, which includes all necessary medical information pertaining to the patients current course of illness, treatment, post-discharge goals of care, and treatment preferences.

## 2020-11-17 NOTE — PROGRESS NOTES
Hospitalist Progress Note    Patient:  Harris Hale      Unit/Bed:6K-27/027-A    YOB: 1980    MRN: [id number]       Acct: [de-identified]     PCP: Maurisio Astudillo MD    Date of Admission: 11/14/2020      Assessment/Plan:  Active Hospital Problems    Diagnosis Date Noted    Alcohol use with withdrawal [F10.939] 11/14/2020       1. Acute EtOH Withdrawal: Last drink 17:30 11/13/20. 4-5 glasses of everclear. EtOH level neg on arrival.  No prior seizure/hallucinations. Never been hospitalized. Reports prior tremors. a. PAWSS score 7 high risk for complicated withdrawal.   b. Reports mild auditory hallucinations overnight x1  c. Pheno prn stopped, started on pheno taper  d. Fall/seizure precautions, daily thiamine / MTV / folate  e. Addiction services, psych  f. She states that she feels as if she is doing much better than yesterday. We will continue phenobarb taper  2. Mild Hypokalemia: likely 2/2 diarrhea, poor intake. Replacement ordered. Monitor Mag.   3. Mild hepatocellular transaminitis (AST 89, ALT 79): likely 2/2 EtOH use. Normal Liver US. Trend. 4. Mild Leukocytopenia, persistent (WBC 4.6):  ? 2/2 EtOH. No fevers or sx of URI/UTI. No lymphopenia  5. Essential Hypertension, uncontrolled: hold HCTZ for volume resuscitiation, Resume ACEi. 6. Bipolar, unclear type: resume prozac and desvenlafaxine (unclear why on SSRI and SNRI). Psych is consulted. Has seen Dr. Francesco Guzman in past.    a. Seems more depressed at this time. 7. Hx of Suicide attempts: in 2010 per patient. Denies SI/HI currently. 8. Obesity: BMI 32.  on weight loss.        Expected discharge date:  tbd    Disposition: tbd         [] Home       [] TCU       [] Rehab       [] Psych       [] SNF       [] [place]       [] Other-    --------------------------------------------    Chief Complaint: EtOH withdrawal    Hospital Course: Per HPI, \"Hattie Anderson is a 36year old white female former smoker with PMH of HTN, Bipolar who presented to [address] [place] [address] via direct transfer from [facility] for EtOH withdrawal on 11/14/20. Last drink 17:30 11/13/20, she drinks 4-5 glasses of Everclear daily for the past 2 years but has been drinking since she was 12. She has been to an [place] [id number] meeting once. Never been hospitalized for withdrawal, she blacks out when she drinks. She states she starts to withdrawal every morning with HA, diaphoresis and then will drink. No hx of seizures/hallucinations. States she woke up on the kitchen floor a few days ago and had urinated, but she was home alone so she is unsure if she had a seizure. Associated current N/D, HA, diaphoresis, chills. Denies vomiting.      On arrival vitals showed: HR 99, /107, RR 14, and afebrile. k 3.2, mg 1.8, AST 89, ALT 79, etoh neg, wbc 4.6\"    Subjective (past 24 hours):     No major changes to the plan today. Patient is very anxious. Given that this could be related to her alcohol withdrawal we will continue to taper and continue to monitor her for any signs of severe alcohol withdrawal.  Suspect she could possibly be discharged tomorrow. Unless, psychiatry thinks that she needs monitoring on 4E.   At this time patient is not suicidal.    Medications:  Reviewed    Infusion Medications   Scheduled Medications    traZODone  50 mg Oral Nightly    PHENobarbital  32.4 mg Oral BID    sodium chloride flush  10 mL Intravenous 2 times per day    enoxaparin  40 mg Subcutaneous Daily    desvenlafaxine succinate  100 mg Oral Daily    FLUoxetine  20 mg Oral Daily    pantoprazole  40 mg Oral QAM AC    lisinopril  10 mg Oral Daily    And    [Held by provider] hydroCHLOROthiazide  12.5 mg Oral Daily    thiamine  100 mg Oral Daily    multivitamin  1 tablet Oral Daily    folic acid  1 mg Oral Daily     PRN Meds: sodium chloride flush, acetaminophen **OR** acetaminophen, polyethylene glycol, promethazine **OR** ondansetron, hydrOXYzine, potassium chloride, magnesium sulfate      Intake/Output Summary (Last 24 hours) at 11/17/2020 1832  Last data filed at 11/17/2020 0315  Gross per 24 hour   Intake 410 ml   Output 0 ml   Net 410 ml     Weight change: 6.4 oz (0.181 kg)      Exam:  /72   Pulse 92   Temp 98.3 °F (36.8 °C) (Oral)   Resp 16   Ht 5' 6\" (1.676 m)   Wt 202 lb 9.6 oz (91.9 kg)   SpO2 96%   BMI 32.70 kg/m²     General appearance: mild distress, well developed, appears stated age. Tearful  Eyes:  Pupils equal, round, and reactive to light. Conjunctivae/corneas clear. HENT: Head normal in appearance. External nares normal.  Oral mucosa moist without lesions. Hearing grossly intact. Neck: Supple, with full range of motion. No jugular venous distention. Trachea midline. Respiratory:  Normal respiratory effort. Clear to auscultation, bilaterally without rales or wheezes or Rhonchi. Cardiovascular: Normal rate, regular rhythm with normal S1/S2 without murmurs. No lower extremity edema. Abdomen: Soft, non-tender, non-distended with normal bowel sounds. Musculoskeletal: Normal range of motion in extremities. There is no joint swelling or tenderness. Skin: Skin color, texture, turgor normal.  No rashes or lesions. Neurologic:  Neurovascularly intact without any focal sensory/motor deficits in the upper and lower extremities. Cranial nerves:  grossly non-focal.  Mild tremor in fingers. Psychiatric: Alert and oriented, thought content appropriate, normal insight. Appears to have an anxious affect  Capillary Refill: Brisk,< 3 seconds. Peripheral Pulses: +2 palpable, equal bilaterally.       Labs:   Recent Labs     11/15/20  0624   WBC 3.8*   HGB 12.4   HCT 36.5*        Recent Labs     11/15/20  0624 11/16/20  0919    139   K 3.3* 3.5    105   CO2 25 19*   BUN 3* 3*   CREATININE 0.5 0.6   CALCIUM 8.4* 8.5     Recent Labs     11/16/20  0919   AST 82*   ALT 72*   BILITOT 0.4   ALKPHOS 69     No results for input(s): INR in the last 72 hours. No results for input(s): Aniceto Kat in the last 72 hours. Microbiology:      Urinalysis:      Lab Results   Component Value Date    NITRU NEGATIVE 11/14/2020    WBCUA 0-2 11/14/2020    BACTERIA NONE SEEN 11/14/2020    RBCUA 0-2 11/14/2020    BLOODU MODERATE 11/14/2020    SPECGRAV 1.009 11/14/2020       Radiology:  US LIVER   Final Result   Normal liver ultrasound. **This report has been created using voice recognition software. It may contain minor errors which are inherent in voice recognition technology. **      Final report electronically signed by Dr. Nikki Oro on 11/14/2020 7:27 AM          DVT prophylaxis: [x] Lovenox                                  [] SCDs                                 [] SQ Heparin                                 [] Encourage ambulation           [] Already on Anticoagulation     Code Status: Full Code    PT/OT Eval Status:     Diet:   DIET GENERAL;    Fluids: na    Tele:   [x] yes             [] no      Electronically signed by Francisco J Ricci MD on 11/17/2020 at 6:32 PM

## 2020-11-18 VITALS
WEIGHT: 203.2 LBS | HEART RATE: 88 BPM | HEIGHT: 66 IN | BODY MASS INDEX: 32.66 KG/M2 | DIASTOLIC BLOOD PRESSURE: 70 MMHG | SYSTOLIC BLOOD PRESSURE: 106 MMHG | RESPIRATION RATE: 18 BRPM | OXYGEN SATURATION: 98 % | TEMPERATURE: 97.8 F

## 2020-11-18 PROCEDURE — 6370000000 HC RX 637 (ALT 250 FOR IP): Performed by: PHYSICIAN ASSISTANT

## 2020-11-18 PROCEDURE — 2580000003 HC RX 258: Performed by: PHYSICIAN ASSISTANT

## 2020-11-18 PROCEDURE — 99239 HOSP IP/OBS DSCHRG MGMT >30: CPT | Performed by: INTERNAL MEDICINE

## 2020-11-18 RX ORDER — LANOLIN ALCOHOL/MO/W.PET/CERES
100 CREAM (GRAM) TOPICAL DAILY
Qty: 30 TABLET | Refills: 3 | Status: SHIPPED | OUTPATIENT
Start: 2020-11-19 | End: 2020-11-24 | Stop reason: SDUPTHER

## 2020-11-18 RX ORDER — MULTIVITAMIN WITH IRON
1 TABLET ORAL DAILY
Qty: 30 TABLET | Refills: 1 | Status: SHIPPED | OUTPATIENT
Start: 2020-11-19 | End: 2020-11-25 | Stop reason: SDUPTHER

## 2020-11-18 RX ORDER — TRAZODONE HYDROCHLORIDE 50 MG/1
50 TABLET ORAL NIGHTLY
Qty: 30 TABLET | Refills: 0 | Status: SHIPPED | OUTPATIENT
Start: 2020-11-18 | End: 2020-11-24 | Stop reason: SDUPTHER

## 2020-11-18 RX ORDER — FOLIC ACID 1 MG/1
1 TABLET ORAL DAILY
Qty: 30 TABLET | Refills: 3 | Status: SHIPPED | OUTPATIENT
Start: 2020-11-19 | End: 2020-11-24 | Stop reason: SDUPTHER

## 2020-11-18 RX ADMIN — DESVENLAFAXINE SUCCINATE 100 MG: 100 TABLET, FILM COATED, EXTENDED RELEASE ORAL at 08:52

## 2020-11-18 RX ADMIN — FLUOXETINE HYDROCHLORIDE 20 MG: 20 CAPSULE ORAL at 08:52

## 2020-11-18 RX ADMIN — POLYETHYLENE GLYCOL 3350 17 G: 17 POWDER, FOR SOLUTION ORAL at 11:28

## 2020-11-18 RX ADMIN — LISINOPRIL 10 MG: 10 TABLET ORAL at 08:52

## 2020-11-18 RX ADMIN — PANTOPRAZOLE SODIUM 40 MG: 40 TABLET, DELAYED RELEASE ORAL at 06:06

## 2020-11-18 RX ADMIN — Medication 100 MG: at 08:52

## 2020-11-18 RX ADMIN — FOLIC ACID 1 MG: 1 TABLET ORAL at 08:52

## 2020-11-18 RX ADMIN — THERA TABS 1 TABLET: TAB at 08:52

## 2020-11-18 RX ADMIN — SODIUM CHLORIDE, PRESERVATIVE FREE 10 ML: 5 INJECTION INTRAVENOUS at 08:52

## 2020-11-18 ASSESSMENT — PAIN SCALES - GENERAL
PAINLEVEL_OUTOF10: 0

## 2020-11-18 NOTE — PROGRESS NOTES
Utilize [facility] alcohol withdrawal scale (Based on [place] Modified Alcohol Withdrawal Scale). Tabulate score based on classifications including Tremor, Sweating, Hallucination, Orientation, and Agitation. Tremor: 0  Sweatin  Hallucinations: 0  Orientation: 0  Agitation: 0  Total Score: 0  Action perform as described below     Tremor:  No tremor is 0 points. Tremor on movement is 1 point. Tremor at rest is 2 points. Sweating: No Sweat 0 points. Moist is 1 point. Drenching sweats is 2 points. Hallucinations: No present 0 points. Dissuadable is 1 point. Not dissuadable is 2 points. Orientation: Oriented 0 points. Vague/detached 1 point. Disoriented/no contact 2 points. Agitation: Calm 0 points. Anxious 1 point. Panicky 2 points. Check scale every 2 hours. Discontinue scoring with 4 consecutive scorings of 0. Scale 0: No phenobarbital given. Re-assess every 60 minutes as needed. Scale 1-3: Phenobarbital 130 mg IV over 3 minutes. Re-assess every 60 minutes as needed. May administer every 60 minutes to a maximum dose of phenobarbital 1040 mg in 24 hours! Scale 4-8: Phenobarbital 260 mg IV over 5 minutes. Re-assess every 60 minutes as needed. May administer every 60 minutes to a maximum dose of phenobarbital 1040mg in 24 hours! Scale 9-10: Transfer to ICU (if not already in ICU). Administer 10mg/kg phenobarbital IV over 60 minutes. Maximum dose phenobarbital is 1040mg in 24 hours!

## 2020-11-18 NOTE — PROGRESS NOTES
Discharge teaching and instructions for diagnosis/procedure of alcohol withdrawal completed with patient using teachback method. AVS reviewed. Printed prescriptions given to patient. Patient voiced understanding regarding prescriptions, follow up appointments, and care of self at home. Discharged ambulatory to  independent living per family.

## 2020-11-18 NOTE — CARE COORDINATION
11/18/20, 2:51 PM EST    Patient goals/plan/ treatment preferences discussed by  and . Patient goals/plan/ treatment preferences reviewed with patient/ family. Patient/ family verbalize understanding of discharge plan and are in agreement with goal/plan/treatment preferences. Understanding was demonstrated using the teach back method. AVS provided by RN at time of discharge, which includes all necessary medical information pertaining to the patients current course of illness, treatment, post-discharge goals of care, and treatment preferences. Discharge home, denied needs. Resources provided. Follows with Dr. Ольга Grimes.

## 2020-11-19 ENCOUNTER — TELEPHONE (OUTPATIENT)
Dept: FAMILY MEDICINE CLINIC | Age: 40
End: 2020-11-19

## 2020-11-19 NOTE — TELEPHONE ENCOUNTER
Trev Hernandez was in the hospital from 11/13 until 11/18 for detox. They gave her an Rx for Trazadone 50mg to help her sleep. It did not help. She wants to know if she can try 100mg to see if that would help her sleep. She tried the Trazadone with Atarax and she slept until 1 pm today.

## 2020-11-19 NOTE — DISCHARGE SUMMARY
Hospital Medicine Discharge Summary      Patient Identification:   Luann Gifford   : 1980  MRN: [id number]   Account: [de-identified]      Patient's PCP: Roberta Ghotra MD    Admit Date: 2020     Discharge Date: 2020      Admitting Physician: Chano Montez MD     Discharge Physician: Lucio Storey MD     Discharge Diagnoses: Active Hospital Problems    Diagnosis Date Noted    Alcohol use with withdrawal [F10.939] 2020       The patient was seen and examined on day of discharge and this discharge summary is in conjunction with any daily progress note from day of discharge. Hospital Course:   Per HPI, \"Hattie Gutierres is a 36year old white female former smoker with PMH of HTN, Leandro Mcburney presented to [address] [place] [address] via direct transfer from [facility] for EtOH withdrawal on 20. Last drink 17:30 20, she drinks 4-5 glasses of Everclear daily for the past 2 years but has been drinking since she was 12. She has been to an [place] [address] meeting once. Never been hospitalized for withdrawal, she blacks out when she drinks. She states she starts to withdrawal every morning with HA, diaphoresis and then will drink. No hx of seizures/hallucinations. States she woke up on the kitchen floor a few days ago and had urinated, but she was home alone so she is unsure if she had a seizure. Associated current N/D, HA, diaphoresis, chills. Denies vomiting.      On arrival vitals showed: HR 99, /107, RR 14, and afebrile. k 3.2, mg 1.8, AST 89, ALT 79, etoh neg, wbc 4.6\"    Patient was treated with phenobarbital taper after she developed delirium early in her stay. She improved nicely. She was also seen by psychiatry. She denied SI/HI/AVH.       Exam:     Vitals:  Vitals:    20 0334 20 0408 20 0845 20 1115   BP: (!) 113/55 110/71 101/66 106/70   Pulse: 93 86 89 88   Resp:    Temp: 97.9 °F (36.6 °C)  98.1 °F (36.7 °C) 97.8 °F (36.6 °C)   TempSrc: Oral  Oral Oral SpO2: 96%  97% 98%   Weight: 203 lb 3.2 oz (92.2 kg)      Height:         Weight: Weight: 203 lb 3.2 oz (92.2 kg)     24 hour intake/output:No intake or output data in the 24 hours ending 11/19/20 1509      General appearance:  No apparent distress, appears stated age and cooperative. HEENT:  Normal cephalic, atraumatic without obvious deformity. Pupils equal, round, and reactive to light. Extra ocular muscles intact. Conjunctivae/corneas clear. Neck: Supple, with full range of motion. No jugular venous distention. Trachea midline. Respiratory:  Normal respiratory effort. Clear to auscultation, bilaterally without Rales/Wheezes/Rhonchi. Cardiovascular:  Regular rate and rhythm with normal S1/S2 without murmurs, rubs or gallops. Abdomen: Soft, non-tender, non-distended with normal bowel sounds. Musculoskeletal:  No clubbing, cyanosis or edema bilaterally. Full range of motion without deformity. Skin: Skin color, texture, turgor normal.  No rashes or lesions. Neurologic:  Neurovascularly intact without any focal sensory/motor deficits. Cranial nerves: II-XII intact, grossly non-focal.  Psychiatric:  Alert and oriented, thought content appropriate, normal insight  Capillary Refill: Brisk,< 3 seconds   Peripheral Pulses: +2 palpable, equal bilaterally       Labs: For convenience and continuity at follow-up the following most recent labs are provided:      CBC:    Lab Results   Component Value Date    WBC 3.8 11/15/2020    HGB 12.4 11/15/2020    HCT 36.5 11/15/2020     11/15/2020       Renal:    Lab Results   Component Value Date     11/16/2020    K 3.5 11/16/2020     11/16/2020    CO2 19 11/16/2020    BUN 3 11/16/2020    CREATININE 0.6 11/16/2020    CALCIUM 8.5 11/16/2020         Significant Diagnostic Studies    Radiology:   US LIVER   Final Result   Normal liver ultrasound. **This report has been created using voice recognition software.   It may contain minor errors which are inherent in voice recognition technology. **      Final report electronically signed by Dr. Nikki Oro on 11/14/2020 7:27 AM             Consults:     IP CONSULT TO SOCIAL WORK  IP CONSULT TO PSYCHIATRY  IP CONSULT TO ADDICTION SERVICES  IP CONSULT TO PHARMACY    Disposition: Home  Condition at Discharge: Stable    Code Status:  Prior     Patient Instructions:    Discharge lab work: none  Activity: activity as tolerated  Diet: No diet orders on file      Follow-up visits:   No follow-up provider specified.        Discharge Medications:      Beryl Moore   [facility] Medication Instructions AQA:[id number]    Printed on:11/19/20 5776   Medication Information                      Aspirin Buf,FuQpu-MtClh-XiHsz, (BUFFERED ASPIRIN) 325 MG TABS  Take 1 tablet by mouth daily             B Complex-Biotin-FA (SUPER B-50 COMPLEX) CAPS  1 tablet daily             Cetirizine HCl (ZYRTEC PO)  Take 1 tablet by mouth daily             desvenlafaxine succinate (PRISTIQ) 100 MG TB24 extended release tablet  Take 1 tablet by mouth daily             FLUoxetine (PROZAC) 20 MG capsule  Take 1 capsule by mouth daily             folic acid (FOLVITE) 1 MG tablet  Take 1 tablet by mouth daily             hydrOXYzine (ATARAX) 25 MG tablet  Take 1 tablet by mouth 4 times daily as needed for Anxiety             lansoprazole (PREVACID SOLUTAB) 30 MG disintegrating tablet  PLACE 1 TABLET ON THE TONGUE DAILY             lisinopril-hydroCHLOROthiazide (PRINZIDE;ZESTORETIC) 10-12.5 MG per tablet  Take 1 tablet by mouth daily             Multiple Vitamin (MULTIVITAMIN) TABS tablet  Take 1 tablet by mouth daily             norgestimate-ethinyl estradiol (ORTHO-CYCLEN, 28,) 0.25-35 MG-MCG per tablet  Take 1 tablet by mouth 2 times daily             traZODone (DESYREL) 50 MG tablet  Take 1 tablet by mouth nightly             vitamin B-1 100 MG tablet  Take 1 tablet by mouth daily                 Time Spent on discharge is more than 30 minutes in the examination, evaluation, counseling and review of medications and discharge plan. Signed: Thank you Jayme Naylor MD for the opportunity to be involved in this patient's care.     Electronically signed by Yaa Castellanos MD on 11/19/2020 at 3:09 PM

## 2020-11-24 ENCOUNTER — VIRTUAL VISIT (OUTPATIENT)
Dept: FAMILY MEDICINE CLINIC | Age: 40
End: 2020-11-24
Payer: COMMERCIAL

## 2020-11-24 PROCEDURE — 99214 OFFICE O/P EST MOD 30 MIN: CPT | Performed by: FAMILY MEDICINE

## 2020-11-24 RX ORDER — FOLIC ACID 1 MG/1
1 TABLET ORAL DAILY
Qty: 90 TABLET | Refills: 1 | Status: SHIPPED | OUTPATIENT
Start: 2020-11-24 | End: 2021-05-06

## 2020-11-24 RX ORDER — LANOLIN ALCOHOL/MO/W.PET/CERES
100 CREAM (GRAM) TOPICAL DAILY
Qty: 90 TABLET | Refills: 1 | Status: SHIPPED | OUTPATIENT
Start: 2020-11-24 | End: 2021-05-06

## 2020-11-24 RX ORDER — TRAZODONE HYDROCHLORIDE 100 MG/1
100 TABLET ORAL NIGHTLY
Qty: 90 TABLET | Refills: 3 | Status: SHIPPED | OUTPATIENT
Start: 2020-11-24 | End: 2021-01-14

## 2020-11-24 ASSESSMENT — ENCOUNTER SYMPTOMS
NAUSEA: 0
COUGH: 1
SINUS PRESSURE: 1
DIARRHEA: 0
SHORTNESS OF BREATH: 0

## 2020-11-24 NOTE — PROGRESS NOTES
2020    TELEHEALTH EVALUATION -- Audio/Visual (During BVQUW-95 public health emergency)    HPI:    Lara Bloch (:  1980) has requested an audio/video evaluation for the following concern(s):    Patient hospitalized  to  for detox and alcoholism. Last drink 2020. Adjusting still and figuring out support/therapy session. Some communication issues. Feeling okay but hard time sleeping. Odd dreams but not nightmares or night terrors. Trazadone helping sleep -- about 8-10 hours, interrupted with dreams and dose 100 mg now. She is also on prozac and pristiq which she believes has helped her significantly. No signs of serotonin syndrome. Waking up tired. Off and on coughing spells, dry cough; several days. Was in hospital but no COVID19 direct exposure to her knowledge. Blames on weather/cold house. No fever. Has chills off and on -- mild but doesn't feel ill. Some headaches, functions well. Slight sinus trouble currently, leading to some headaches. Minimal congestion. Brain fog at times. Taking Vitamin B complex and added vitamin Thiamine and folic acid. Would like this refilled as only has 30 days. Eating better but gets craving -- mostly vegetarian but does eat eggs. Cravings mac-n-cheese and sometimes less than healthy snacks. Starting to do dark chocolate. Yesterday finally connected to therapist, started 17 week outpatient rehab. Intensive program at [facility]. Dallin Santos, therapist, will look into coverage as insurance may not pay. --  night, online but she didn't know,   -- 3 x a week will be meeting group once  -- one time a week with therapist    Started digestive enzymes for her stomach. Got off prevacid. Review of Systems   Constitutional: Negative for fatigue and fever. HENT: Positive for sinus pressure. Respiratory: Positive for cough. Negative for shortness of breath. Cardiovascular: Negative for chest pain and leg swelling. Appears well-developed and well-nourished [x] No apparent distress      [] Abnormal-   Mental status  [x] Alert and awake  [x] Oriented to person/place/time [x]Able to follow commands      Eyes:  EOM    []  Normal  [] Abnormal-  Sclera  [x]  Normal  [] Abnormal -         Discharge []  None visible  [] Abnormal -    HENT:   [x] Normocephalic, atraumatic. [] Abnormal   [] Mouth/Throat: Mucous membranes are moist.     External Ears [x] Normal  [] Abnormal-     Neck: [x] No visualized mass     Pulmonary/Chest: [x] Respiratory effort normal.  [x] No visualized signs of difficulty breathing or respiratory distress        [] Abnormal-      Musculoskeletal:   [] Normal gait with no signs of ataxia         [x] Normal range of motion of neck        [] Abnormal-       Neurological:        [] No Facial Asymmetry (Cranial nerve 7 motor function) (limited exam to video visit)          [] No gaze palsy        [] Abnormal-         Skin:        [x] No significant exanthematous lesions or discoloration noted on facial skin         [] Abnormal-            Psychiatric:       [x] Appropriate Affect , teary eyed when discussing sensitive things        [] Abnormal-       ASSESSMENT/PLAN:  Diagnoses and all orders for this visit:    Recurrent major depressive disorder, in partial remission ([facility] [address]    Alcoholism ([facility] [address]  -     traZODone (DESYREL) 100 MG tablet; Take 1 tablet by mouth nightly  -     thiamine 100 MG tablet; Take 1 tablet by mouth daily  -     folic acid (FOLVITE) 1 MG tablet; Take 1 tablet by mouth daily    Anxiety    continue current therapy  -- doubt need of prozac along with Pristiq. Patient prefers not to change  -- trazadone dose increased  Encouraged healthy eating -- details/tips given for fueling body and brain    Counseling given  She will continue with therapist and group sessions. Hopefully insurance will cover    Return in about 2 months (around 1/24/2021).     Jeff Holguin is a 36 y.o. female being evaluated by

## 2020-11-25 RX ORDER — MULTIVITAMIN WITH IRON
1 TABLET ORAL DAILY
Qty: 90 TABLET | Refills: 3 | Status: SHIPPED | OUTPATIENT
Start: 2020-11-25 | End: 2021-11-02

## 2020-11-25 NOTE — TELEPHONE ENCOUNTER
Patient said that when she had her appointment yesterday 11/24/2020 with Dr. Carlin Sadler she forgot to request a [facility]     Nataliia Dyer called requesting a refill on the following medications:  Requested Prescriptions     Pending Prescriptions Disp Refills    Multiple Vitamin (MULTIVITAMIN) TABS tablet 30 tablet 1     Sig: Take 1 tablet by mouth daily     Pharmacy [facility]:  [email or web address]    Date of last visit: 11/24/2020  Date of next visit (if applicable): 8/40/2748

## 2020-12-02 ENCOUNTER — TELEPHONE (OUTPATIENT)
Dept: FAMILY MEDICINE CLINIC | Age: 40
End: 2020-12-02

## 2020-12-02 RX ORDER — TRAZODONE HYDROCHLORIDE 100 MG/1
100 TABLET ORAL NIGHTLY
Qty: 30 TABLET | Refills: 0 | Status: SHIPPED | OUTPATIENT
Start: 2020-12-02 | End: 2020-12-16 | Stop reason: SDUPTHER

## 2020-12-02 NOTE — TELEPHONE ENCOUNTER
Nannetteange Deweyjadon 's Trazadone was ordered thru [address] It has been shipped but she only has enough for tonight. Would you order just enough for a few days until her mail order gets here. Would like sent to [facility]. Thank you.

## 2020-12-16 ENCOUNTER — OFFICE VISIT (OUTPATIENT)
Dept: FAMILY MEDICINE CLINIC | Age: 40
End: 2020-12-16
Payer: COMMERCIAL

## 2020-12-16 VITALS
SYSTOLIC BLOOD PRESSURE: 117 MMHG | TEMPERATURE: 97.7 F | HEART RATE: 96 BPM | BODY MASS INDEX: 32.83 KG/M2 | WEIGHT: 203.4 LBS | OXYGEN SATURATION: 91 % | DIASTOLIC BLOOD PRESSURE: 76 MMHG

## 2020-12-16 PROCEDURE — 99213 OFFICE O/P EST LOW 20 MIN: CPT | Performed by: FAMILY MEDICINE

## 2020-12-16 RX ORDER — ASPIRIN 81 MG/1
81 TABLET ORAL DAILY
COMMUNITY

## 2020-12-16 RX ORDER — LAMOTRIGINE 25 MG/1
TABLET ORAL
Qty: 60 TABLET | Refills: 3 | Status: SHIPPED | OUTPATIENT
Start: 2020-12-16 | End: 2021-03-17 | Stop reason: SINTOL

## 2020-12-16 NOTE — PROGRESS NOTES
[address] [place] [address] Rd, [address], [place]  Phone:  [phone]  WHE:[phone]       Name: Nataliia Dyer  : 1980    Chief Complaint   Patient presents with    Anxiety     recently quit drinking        HPI:     Nataliia Dyer is a 36 y.o. female who presents today for     HPI    Off alcohol -- since [facility] November. Sleep -- better with trazodone  Still bothered by irritability, mood swings. Many things out of her control with things that make her irritable, but having hard time controlling her feeling. Therapy -- has individual counselor but group therapy did not go well. The group was addressing issues that she is not dealing with (as in how to fill out a resume) and there was communication trouble between members. One program was $7K which she can't afford. not doing IOP.  has appt. will be doing drug tested. Eating well but sometimes adds junk carbs. Somewhat active with house activity.     Current Outpatient Medications:     aspirin 81 MG EC tablet, Take 81 mg by mouth daily, Disp: , Rfl:     Multiple Vitamin (MULTIVITAMIN) TABS tablet, Take 1 tablet by mouth daily, Disp: 90 tablet, Rfl: 3    traZODone (DESYREL) 100 MG tablet, Take 1 tablet by mouth nightly, Disp: 90 tablet, Rfl: 3    thiamine 100 MG tablet, Take 1 tablet by mouth daily, Disp: 90 tablet, Rfl: 1    folic acid (FOLVITE) 1 MG tablet, Take 1 tablet by mouth daily, Disp: 90 tablet, Rfl: 1    B Complex-Biotin-FA (SUPER B-50 COMPLEX) CAPS, 1 tablet daily, Disp: 100 capsule, Rfl: 3    desvenlafaxine succinate (PRISTIQ) 100 MG TB24 extended release tablet, Take 1 tablet by mouth daily, Disp: 90 tablet, Rfl: 3    FLUoxetine (PROZAC) 20 MG capsule, Take 1 capsule by mouth daily, Disp: 90 capsule, Rfl: 3    lisinopril-hydroCHLOROthiazide (PRINZIDE;ZESTORETIC) 10-12.5 MG per tablet, Take 1 tablet by mouth daily, Disp: 90 tablet, Rfl: 3   hydrOXYzine (ATARAX) 25 MG tablet, Take 1 tablet by mouth 4 times daily as needed for Anxiety, Disp: 360 tablet, Rfl: 3    Cetirizine HCl (ZYRTEC PO), Take 1 tablet by mouth daily, Disp: , Rfl:     Allergies   Allergen Reactions    Wellbutrin [Bupropion] Anaphylaxis       Review of Systems   Constitutional: Negative for activity change, fatigue and fever. Respiratory: Negative for shortness of breath. Cardiovascular: Negative for chest pain and leg swelling. Neurological: Negative for headaches. Psychiatric/Behavioral: Positive for dysphoric mood. The patient is nervous/anxious. Objective:     /76 (Site: Right Upper Arm)   Pulse 96   Temp 97.7 °F (36.5 °C)   Wt 203 lb 6.4 oz (92.3 kg)   SpO2 91%   BMI 32.83 kg/m²   Wt Readings from Last 3 Encounters:   12/16/20 203 lb 6.4 oz (92.3 kg)   11/18/20 203 lb 3.2 oz (92.2 kg)   10/16/20 199 lb 9.6 oz (90.5 kg)       Physical Exam  Gen: NAD, AAO x 3, coherent, pleasant  CTAB. RRR  Psych: anxious, irritable. No SI/HI. Good judgement  Assessment/Plan:     Severiano Gear was seen today for anxiety. Diagnoses and all orders for this visit:    Alcoholism ([facility] [address]  -     lamoTRIgine (LAMICTAL) 25 MG tablet; Take 1 tablet nightly for 2 weeks and increase to 2 at night    Poor concentration  -     lamoTRIgine (LAMICTAL) 25 MG tablet; Take 1 tablet nightly for 2 weeks and increase to 2 at night    Anxiety  -     lamoTRIgine (LAMICTAL) 25 MG tablet; Take 1 tablet nightly for 2 weeks and increase to 2 at night    we discussed options  Already on two serotonin based medications   Will use mood stablizer. Provided information for [facility] to see if would be a good avenue for ongoing therapy/support. Return in about 2 months (around 2/16/2021).     Future Appointments   Date Time Provider Luc Mahoney   4/16/2021  2:00 PM Forrest Beard MD [address] [place] [address] This office note has been dictated. Effort was made to review for errors but some may have been missed. Please contact Loree Love of note for clarification if needed.        Electronically signed by Leonard Moore MD on 12/16/2020 at 11:39 AM

## 2020-12-19 ASSESSMENT — ENCOUNTER SYMPTOMS: SHORTNESS OF BREATH: 0

## 2020-12-28 ENCOUNTER — TELEPHONE (OUTPATIENT)
Dept: FAMILY MEDICINE CLINIC | Age: 40
End: 2020-12-28

## 2020-12-28 NOTE — TELEPHONE ENCOUNTER
Hattie left voicemail stating she started a new medication on 12/16/2020, Lamictal, she is having some symptoms. She is wondering if they would be related to the new medication. She is experiencing terrible PMS, her cycle is out of whack, she started her period but she has two days of birthcontrol pills left before she starts the placebo pills. Please advise.

## 2021-01-11 ENCOUNTER — TELEPHONE (OUTPATIENT)
Dept: FAMILY MEDICINE CLINIC | Age: 41
End: 2021-01-11

## 2021-01-11 NOTE — TELEPHONE ENCOUNTER
Pt is calling in stating the trazodone is causing edema in her hands and feet. She would like to stop the trazodone but is wondering if she should wean off.

## 2021-01-14 ENCOUNTER — HOSPITAL ENCOUNTER (OUTPATIENT)
Age: 41
Discharge: HOME | End: 2021-01-14
Payer: COMMERCIAL

## 2021-01-14 ENCOUNTER — OFFICE VISIT (OUTPATIENT)
Dept: FAMILY MEDICINE CLINIC | Age: 41
End: 2021-01-14
Payer: COMMERCIAL

## 2021-01-14 VITALS — HEART RATE: 94 BPM | SYSTOLIC BLOOD PRESSURE: 150 MMHG | OXYGEN SATURATION: 97 % | DIASTOLIC BLOOD PRESSURE: 80 MMHG

## 2021-01-14 DIAGNOSIS — F33.41 RECURRENT MAJOR DEPRESSIVE DISORDER, IN PARTIAL REMISSION (HCC): ICD-10-CM

## 2021-01-14 DIAGNOSIS — E53.8 B12 DEFICIENCY: ICD-10-CM

## 2021-01-14 DIAGNOSIS — F33.41 RECURRENT MAJOR DEPRESSIVE DISORDER, IN PARTIAL REMISSION (HCC): Primary | ICD-10-CM

## 2021-01-14 DIAGNOSIS — F10.20 ALCOHOLISM (HCC): ICD-10-CM

## 2021-01-14 DIAGNOSIS — R53.83 OTHER FATIGUE: ICD-10-CM

## 2021-01-14 DIAGNOSIS — F51.04 PSYCHOPHYSIOLOGICAL INSOMNIA: ICD-10-CM

## 2021-01-14 DIAGNOSIS — I10 ESSENTIAL HYPERTENSION: Chronic | ICD-10-CM

## 2021-01-14 LAB
ALBUMIN SERPL-MCNC: 4.4 G/DL (ref 3.5–5.1)
ALP BLD-CCNC: 46 U/L (ref 38–126)
ALT SERPL-CCNC: 14 U/L (ref 11–66)
ANION GAP SERPL CALCULATED.3IONS-SCNC: 12 MEQ/L (ref 8–16)
AST SERPL-CCNC: 18 U/L (ref 5–40)
BASOPHILS # BLD: 0.5 %
BASOPHILS ABSOLUTE: 0 THOU/MM3 (ref 0–0.1)
BILIRUB SERPL-MCNC: 0.3 MG/DL (ref 0.3–1.2)
BUN BLDV-MCNC: 9 MG/DL (ref 7–22)
CALCIUM SERPL-MCNC: 9.9 MG/DL (ref 8.5–10.5)
CHLORIDE BLD-SCNC: 101 MEQ/L (ref 98–111)
CO2: 25 MEQ/L (ref 23–33)
CREAT SERPL-MCNC: 0.7 MG/DL (ref 0.4–1.2)
EOSINOPHIL # BLD: 0.8 %
EOSINOPHILS ABSOLUTE: 0.1 THOU/MM3 (ref 0–0.4)
ERYTHROCYTE [DISTWIDTH] IN BLOOD BY AUTOMATED COUNT: 11.7 % (ref 11.5–14.5)
ERYTHROCYTE [DISTWIDTH] IN BLOOD BY AUTOMATED COUNT: 40.2 FL (ref 35–45)
GFR SERPL CREATININE-BSD FRML MDRD: > 90 ML/MIN/1.73M2
GLUCOSE BLD-MCNC: 82 MG/DL (ref 70–108)
HCT VFR BLD CALC: 41.4 % (ref 37–47)
HEMOGLOBIN: 13.3 GM/DL (ref 12–16)
IMMATURE GRANS (ABS): 0.02 THOU/MM3 (ref 0–0.07)
IMMATURE GRANULOCYTES: 0.2 %
LYMPHOCYTES # BLD: 26 %
LYMPHOCYTES ABSOLUTE: 2.2 THOU/MM3 (ref 1–4.8)
MCH RBC QN AUTO: 30.2 PG (ref 26–33)
MCHC RBC AUTO-ENTMCNC: 32.1 GM/DL (ref 32.2–35.5)
MCV RBC AUTO: 94.1 FL (ref 81–99)
MONOCYTES # BLD: 6.7 %
MONOCYTES ABSOLUTE: 0.6 THOU/MM3 (ref 0.4–1.3)
NUCLEATED RED BLOOD CELLS: 0 /100 WBC
PLATELET # BLD: 342 THOU/MM3 (ref 130–400)
PMV BLD AUTO: 10.8 FL (ref 9.4–12.4)
POTASSIUM SERPL-SCNC: 3.8 MEQ/L (ref 3.5–5.2)
RBC # BLD: 4.4 MILL/MM3 (ref 4.2–5.4)
SEG NEUTROPHILS: 65.8 %
SEGMENTED NEUTROPHILS ABSOLUTE COUNT: 5.7 THOU/MM3 (ref 1.8–7.7)
SODIUM BLD-SCNC: 138 MEQ/L (ref 135–145)
TOTAL PROTEIN: 7.1 G/DL (ref 6.1–8)
WBC # BLD: 8.6 THOU/MM3 (ref 4.8–10.8)

## 2021-01-14 PROCEDURE — 80053 COMPREHEN METABOLIC PANEL: CPT

## 2021-01-14 PROCEDURE — 85025 COMPLETE CBC W/AUTO DIFF WBC: CPT

## 2021-01-14 PROCEDURE — 82607 VITAMIN B-12: CPT

## 2021-01-14 PROCEDURE — 82306 VITAMIN D 25 HYDROXY: CPT

## 2021-01-14 PROCEDURE — 99215 OFFICE O/P EST HI 40 MIN: CPT | Performed by: FAMILY MEDICINE

## 2021-01-14 PROCEDURE — 36415 COLL VENOUS BLD VENIPUNCTURE: CPT

## 2021-01-14 RX ORDER — VIT C/B6/B5/MAGNESIUM/HERB 173 50-5-6-5MG
CAPSULE ORAL
COMMUNITY
End: 2022-09-07

## 2021-01-14 RX ORDER — MAGNESIUM 30 MG
30 TABLET ORAL 2 TIMES DAILY
COMMUNITY
End: 2022-09-07

## 2021-01-14 RX ORDER — LISINOPRIL AND HYDROCHLOROTHIAZIDE 12.5; 1 MG/1; MG/1
TABLET ORAL
Qty: 90 TABLET | Refills: 3 | OUTPATIENT
Start: 2021-01-14

## 2021-01-14 RX ORDER — MULTIVIT-MIN/IRON/FOLIC ACID/K 18-600-40
CAPSULE ORAL
COMMUNITY
End: 2022-09-07

## 2021-01-14 RX ORDER — MULTIVIT WITH MINERALS/LUTEIN
250 TABLET ORAL DAILY
COMMUNITY
End: 2022-09-07

## 2021-01-14 RX ORDER — MIRTAZAPINE 15 MG/1
15 TABLET, FILM COATED ORAL NIGHTLY
Qty: 30 TABLET | Refills: 5 | Status: SHIPPED | OUTPATIENT
Start: 2021-01-14 | End: 2021-01-18

## 2021-01-15 LAB
VITAMIN B-12: 386 PG/ML (ref 211–911)
VITAMIN D 25-HYDROXY: 59 NG/ML (ref 30–100)

## 2021-01-18 ENCOUNTER — TELEPHONE (OUTPATIENT)
Dept: FAMILY MEDICINE CLINIC | Age: 41
End: 2021-01-18

## 2021-01-18 DIAGNOSIS — F51.04 PSYCHOPHYSIOLOGICAL INSOMNIA: Primary | ICD-10-CM

## 2021-01-18 RX ORDER — DOXEPIN HYDROCHLORIDE 25 MG/1
25-50 CAPSULE ORAL NIGHTLY
Qty: 60 CAPSULE | Refills: 3 | Status: SHIPPED | OUTPATIENT
Start: 2021-01-18 | End: 2021-03-17

## 2021-01-18 NOTE — TELEPHONE ENCOUNTER
Pt states she is eating too much and sleeping too much since starting Remeron. Wants to D/C and try different Rx. If OK to RA in [place] [phone] please.

## 2021-01-18 NOTE — TELEPHONE ENCOUNTER
Spoke with Marston Bence- discussed Dr. Jose Peterson directive. Agreeable to plan. Will call us if she has any problems with the Doxepin.

## 2021-01-20 ENCOUNTER — TELEPHONE (OUTPATIENT)
Dept: FAMILY MEDICINE CLINIC | Age: 41
End: 2021-01-20

## 2021-01-20 NOTE — TELEPHONE ENCOUNTER
Hattie phoned- states that after she discussed switching from Remeron to Doxepin, she decided she would like to see if she can handle being off of those types of medications for now, so she has not been taking either one of them. She said so far she is doing good. I advised Emily Molina that if she felt that she was struggling or needed anything to call us immediately. Hattie verbalized understanding.

## 2021-02-04 DIAGNOSIS — I10 ESSENTIAL HYPERTENSION: Chronic | ICD-10-CM

## 2021-02-05 RX ORDER — LISINOPRIL AND HYDROCHLOROTHIAZIDE 12.5; 1 MG/1; MG/1
1 TABLET ORAL DAILY
Qty: 90 TABLET | Refills: 3 | OUTPATIENT
Start: 2021-02-05

## 2021-02-18 DIAGNOSIS — I10 ESSENTIAL HYPERTENSION: Chronic | ICD-10-CM

## 2021-02-18 NOTE — TELEPHONE ENCOUNTER
Xavi Prescott called requesting a refill on the following medications:  Requested Prescriptions     Pending Prescriptions Disp Refills    lisinopril-hydroCHLOROthiazide (PRINZIDE;ZESTORETIC) 10-12.5 MG per tablet 90 tablet 3     Sig: Take 1 tablet by mouth daily     Pharmacy verified:  [email or web address]      Date of last visit: 1/14/2021  Date of next visit (if applicable): 1/37/1844

## 2021-02-19 RX ORDER — LISINOPRIL AND HYDROCHLOROTHIAZIDE 12.5; 1 MG/1; MG/1
1 TABLET ORAL DAILY
Qty: 90 TABLET | Refills: 3 | OUTPATIENT
Start: 2021-02-19

## 2021-02-26 DIAGNOSIS — I10 ESSENTIAL HYPERTENSION: Chronic | ICD-10-CM

## 2021-02-26 RX ORDER — LISINOPRIL AND HYDROCHLOROTHIAZIDE 12.5; 1 MG/1; MG/1
1 TABLET ORAL DAILY
Qty: 90 TABLET | Refills: 3 | OUTPATIENT
Start: 2021-02-26

## 2021-03-02 ENCOUNTER — TELEPHONE (OUTPATIENT)
Dept: FAMILY MEDICINE CLINIC | Age: 41
End: 2021-03-02

## 2021-03-02 DIAGNOSIS — I10 ESSENTIAL HYPERTENSION: Chronic | ICD-10-CM

## 2021-03-02 RX ORDER — LISINOPRIL AND HYDROCHLOROTHIAZIDE 12.5; 1 MG/1; MG/1
1 TABLET ORAL DAILY
Qty: 90 TABLET | Refills: 0 | Status: SHIPPED | OUTPATIENT
Start: 2021-03-02 | End: 2021-03-17 | Stop reason: SDUPTHER

## 2021-03-02 NOTE — TELEPHONE ENCOUNTER
That makes no sense. Sent a full year 10/18/2021 with 90 tabs/RF3. I see she requested medication 3 times in Feb.   Did her insurance change?

## 2021-03-02 NOTE — PROGRESS NOTES
We have been unable to reach [facility] to understand the problem. Since she is out, I sent 90 tabs of lisinopril/hctz to [facility] so she can get restarted. We apologize for the confusion but we'll need to clarify with [facility] what is going on.

## 2021-03-17 ENCOUNTER — HOSPITAL ENCOUNTER (OUTPATIENT)
Age: 41
Discharge: HOME | End: 2021-03-17
Payer: COMMERCIAL

## 2021-03-17 ENCOUNTER — OFFICE VISIT (OUTPATIENT)
Dept: FAMILY MEDICINE CLINIC | Age: 41
End: 2021-03-17
Payer: COMMERCIAL

## 2021-03-17 VITALS
HEART RATE: 102 BPM | TEMPERATURE: 98.2 F | SYSTOLIC BLOOD PRESSURE: 126 MMHG | DIASTOLIC BLOOD PRESSURE: 80 MMHG | WEIGHT: 212 LBS | BODY MASS INDEX: 34.07 KG/M2 | RESPIRATION RATE: 14 BRPM | OXYGEN SATURATION: 98 % | HEIGHT: 66 IN

## 2021-03-17 DIAGNOSIS — F51.04 PSYCHOPHYSIOLOGICAL INSOMNIA: ICD-10-CM

## 2021-03-17 DIAGNOSIS — E53.8 B12 DEFICIENCY: ICD-10-CM

## 2021-03-17 DIAGNOSIS — F33.41 RECURRENT MAJOR DEPRESSIVE DISORDER, IN PARTIAL REMISSION (HCC): ICD-10-CM

## 2021-03-17 DIAGNOSIS — I10 ESSENTIAL HYPERTENSION: Chronic | ICD-10-CM

## 2021-03-17 DIAGNOSIS — F41.9 ANXIETY: ICD-10-CM

## 2021-03-17 DIAGNOSIS — J30.1 SEASONAL ALLERGIC RHINITIS DUE TO POLLEN: ICD-10-CM

## 2021-03-17 DIAGNOSIS — F43.10 PTSD (POST-TRAUMATIC STRESS DISORDER): Primary | ICD-10-CM

## 2021-03-17 PROCEDURE — 99214 OFFICE O/P EST MOD 30 MIN: CPT | Performed by: FAMILY MEDICINE

## 2021-03-17 PROCEDURE — 36415 COLL VENOUS BLD VENIPUNCTURE: CPT

## 2021-03-17 PROCEDURE — 82607 VITAMIN B-12: CPT

## 2021-03-17 RX ORDER — FLUTICASONE PROPIONATE 50 MCG
1 SPRAY, SUSPENSION (ML) NASAL DAILY
Qty: 1 BOTTLE | Refills: 5 | Status: SHIPPED | OUTPATIENT
Start: 2021-03-17 | End: 2021-12-01 | Stop reason: SDUPTHER

## 2021-03-17 RX ORDER — ALPRAZOLAM 0.5 MG/1
0.5 TABLET ORAL 3 TIMES DAILY PRN
COMMUNITY
End: 2021-03-31 | Stop reason: SDUPTHER

## 2021-03-17 RX ORDER — LISINOPRIL AND HYDROCHLOROTHIAZIDE 12.5; 1 MG/1; MG/1
1 TABLET ORAL DAILY
Qty: 90 TABLET | Refills: 3 | Status: SHIPPED | OUTPATIENT
Start: 2021-03-17 | End: 2021-07-26 | Stop reason: SDUPTHER

## 2021-03-17 RX ORDER — HYDROXYZINE 50 MG/1
75 TABLET, FILM COATED ORAL NIGHTLY
Qty: 135 TABLET | Refills: 3 | Status: SHIPPED | OUTPATIENT
Start: 2021-03-17 | End: 2021-03-29 | Stop reason: SDUPTHER

## 2021-03-17 NOTE — PROGRESS NOTES
[address] [place] [address] Rd, [address], [place]  Phone:  [phone]  GSS:[phone]       Name: Srikanth Quezada  : 1980    Chief Complaint   Patient presents with    Follow-up    Depression     Patient's therapist is recommending patient follow with psychiatrist for medication adjustments       HPI:     Srikanth Quezada is a 39 y.o. female who presents today for     HPI    She continues off alcohol and learning to live without better. However still not sleeping well. Has night terrors at times. She is not sure of trigger. Had panic attack that lasted off and on for 2 days -- left over xanax taken as she was desperate. No alcohol taken. She mentioned this with therapist.  He would like her to work with psychiatrist which I think is a great idea. Has 12 tablets of xanax left. Filled 10/2020, 20 given. lamictal not really helping. She has feared increasing as she believes it messes up with her OCP. We decide to stop this as she can't get benefit from such. Doxepin tried did not help  She would prefer not using risperdal or ability. Using [facility] for sleep of and on. Denies using other drugs. UDS 2020 shows +THC o/w neg. UDS  neg. She is considering getting a [facility] medical card. Willing to try something else for sleep. In review of tiffanie symptoms ongoing for years and emotional trauma from the past, she is displaying signs of post-traumatic stress disorder. Alcoholism was her previous method of dealing with this. She would like to address in a healthier way. Connected to therapist.    Mild b12 deficiency previously. Also allergies starting again. Needs flonase. Current Outpatient Medications:     ALPRAZolam (XANAX) 0.5 MG tablet, Take 0.5 mg by mouth 3 times daily as needed for Sleep or Anxiety. , Disp: , Rfl:     hydrOXYzine (ATARAX) 50 MG tablet, Take 1.5 tablets by mouth nightly, Disp: 135 tablet, Rfl: 3    lisinopril-hydroCHLOROthiazide (PRINZIDE;ZESTORETIC) 10-12.5 MG per tablet, Take 1 tablet by mouth daily, Disp: 90 tablet, Rfl: 3    fluticasone (FLONASE) 50 MCG/ACT nasal spray, 1 spray by Each Nostril route daily, Disp: 1 Bottle, Rfl: 5    Cholecalciferol (VITAMIN D) 50 MCG (2000 UT) CAPS capsule, Take by mouth, Disp: , Rfl:     Milk Thistle 1000 MG CAPS, Take by mouth, Disp: , Rfl:     magnesium 30 MG tablet, Take 30 mg by mouth 2 times daily, Disp: , Rfl:     Lactobacillus (PROBIOTIC ACIDOPHILUS PO), Take by mouth, Disp: , Rfl:     OREGANO PO, Take by mouth, Disp: , Rfl:     Garlic 10 MG CAPS, Take by mouth, Disp: , Rfl:     Turmeric 500 MG CAPS, Take by mouth, Disp: , Rfl:     Ascorbic Acid (VITAMIN C) 250 MG tablet, Take 250 mg by mouth daily, Disp: , Rfl:     DIGESTIVE ENZYMES PO, Take by mouth, Disp: , Rfl:     aspirin 81 MG EC tablet, Take 81 mg by mouth daily, Disp: , Rfl:     Multiple Vitamin (MULTIVITAMIN) TABS tablet, Take 1 tablet by mouth daily, Disp: 90 tablet, Rfl: 3    thiamine 100 MG tablet, Take 1 tablet by mouth daily, Disp: 90 tablet, Rfl: 1    folic acid (FOLVITE) 1 MG tablet, Take 1 tablet by mouth daily, Disp: 90 tablet, Rfl: 1    B Complex-Biotin-FA (SUPER B-50 COMPLEX) CAPS, 1 tablet daily, Disp: 100 capsule, Rfl: 3    desvenlafaxine succinate (PRISTIQ) 100 MG TB24 extended release tablet, Take 1 tablet by mouth daily, Disp: 90 tablet, Rfl: 3    Cetirizine HCl (ZYRTEC PO), Take 1 tablet by mouth daily, Disp: , Rfl:     Allergies   Allergen Reactions    Wellbutrin [Bupropion] Anaphylaxis    Trazodone And Nefazodone      Water retention, aches/pains, slight cough       Review of Systems   Constitutional: Negative for fatigue and fever. HENT: Negative for congestion. Respiratory: Negative for shortness of breath. Cardiovascular: Negative for chest pain and leg swelling. Gastrointestinal: Negative for abdominal pain. Musculoskeletal: Negative for arthralgias. Skin: Negative for rash. Psychiatric/Behavioral: Positive for dysphoric mood (depression still but better than it was) and sleep disturbance. The patient is nervous/anxious. Objective:     /80 (Site: Left Upper Arm, Position: Sitting, Cuff Size: Medium Adult)   Pulse 102   Temp 98.2 °F (36.8 °C) (Temporal)   Resp 14   Ht 5' 6\" (1.676 m)   Wt 212 lb (96.2 kg)   SpO2 98%   BMI 34.22 kg/m²     Physical Exam  Constitutional:       General: She is not in acute distress. Appearance: Normal appearance. She is not ill-appearing. Cardiovascular:      Rate and Rhythm: Normal rate and regular rhythm. Heart sounds: No murmur. Pulmonary:      Effort: Pulmonary effort is normal. No respiratory distress. Breath sounds: Normal breath sounds. No wheezing. Musculoskeletal:         General: No swelling. Neurological:      General: No focal deficit present. Mental Status: She is alert and oriented to person, place, and time. Psychiatric:      Comments: Less anxious, smiles more. Still some aprehension. Lab Results   Component Value Date    [id number] 302 01/14/2021       Assessment/Plan:     [facility] was seen today for follow-up and depression. Diagnoses and all orders for this visit:    PTSD (post-traumatic stress disorder)    Anxiety  -     hydrOXYzine (ATARAX) 50 MG tablet; Take 1.5 tablets by mouth nightly    Essential hypertension  -     lisinopril-hydroCHLOROthiazide (PRINZIDE;ZESTORETIC) 10-12.5 MG per tablet; Take 1 tablet by mouth daily    B12 deficiency  -     Vitamin B12; Future    Recurrent major depressive disorder, in partial remission (HCC)    Psychophysiological insomnia    Seasonal allergic rhinitis due to pollen  -     fluticasone (FLONASE) 50 MCG/ACT nasal spray; 1 spray by Each Nostril route daily    she already has referral to psychiatrist.  Will use atarax to help sleep for now.   THC use with caution, not well-versed in effects of such for THC but understand wanting relief of night

## 2021-03-18 LAB — VITAMIN B-12: 517 PG/ML (ref 211–911)

## 2021-03-21 PROBLEM — J30.1 SEASONAL ALLERGIC RHINITIS DUE TO POLLEN: Status: ACTIVE | Noted: 2021-03-21

## 2021-03-21 PROBLEM — F43.10 PTSD (POST-TRAUMATIC STRESS DISORDER): Status: ACTIVE | Noted: 2021-03-21

## 2021-03-21 ASSESSMENT — ENCOUNTER SYMPTOMS
SHORTNESS OF BREATH: 0
ABDOMINAL PAIN: 0

## 2021-03-29 ENCOUNTER — TELEMEDICINE (OUTPATIENT)
Dept: FAMILY MEDICINE CLINIC | Age: 41
End: 2021-03-29
Payer: COMMERCIAL

## 2021-03-29 DIAGNOSIS — F43.10 PTSD (POST-TRAUMATIC STRESS DISORDER): Primary | ICD-10-CM

## 2021-03-29 DIAGNOSIS — F51.04 PSYCHOPHYSIOLOGICAL INSOMNIA: ICD-10-CM

## 2021-03-29 DIAGNOSIS — F33.41 RECURRENT MAJOR DEPRESSIVE DISORDER, IN PARTIAL REMISSION (HCC): ICD-10-CM

## 2021-03-29 DIAGNOSIS — F41.9 ANXIETY: ICD-10-CM

## 2021-03-29 PROCEDURE — 99213 OFFICE O/P EST LOW 20 MIN: CPT | Performed by: FAMILY MEDICINE

## 2021-03-29 RX ORDER — ARIPIPRAZOLE 5 MG/1
5 TABLET ORAL DAILY
Qty: 30 TABLET | Refills: 5 | Status: SHIPPED | OUTPATIENT
Start: 2021-03-29 | End: 2021-04-26 | Stop reason: SDUPTHER

## 2021-03-29 RX ORDER — HYDROXYZINE 50 MG/1
100 TABLET, FILM COATED ORAL EVERY 8 HOURS PRN
Qty: 150 TABLET | Refills: 3
Start: 2021-03-29 | End: 2021-06-27

## 2021-03-29 ASSESSMENT — ENCOUNTER SYMPTOMS: SHORTNESS OF BREATH: 0

## 2021-03-29 NOTE — PROGRESS NOTES
3/29/2021    TELEHEALTH EVALUATION -- Audio/Visual (During OOEXL-43 public health emergency)    HPI:    Nikolaydayokimberly Officer (:  1980) has requested an audio/video evaluation for the following concern(s):    Patient with PTSD, depression and anxiety -- Anxiety worsening. Difficult to manage during the day. Feels bugs in evening. Atarax 25 mg am during day BID then 75 mg at night. Sleeps for a bit but can't sleep much. Walked during a weekend, like a mile. This felt good and was a bit helpful. H/o alcoholism and has been tempted to start. She talked with [facility] counseling and psychiatry. Intake and paperwork is still needed. So, she was recommended to work with us until they can get her in. This is likely in May. She normally is helped by seeing her loved ones. Unfortunately, now Niece that is close to her has [address] illness. Concerned for her but she appears she will be okay. She was concerned about the baby of the family. She was able to offer some help. They will be okay. It's hard for her not to worry. Review of Systems   Constitutional: Negative for fatigue and fever. Respiratory: Negative for shortness of breath. Cardiovascular: Negative for chest pain and leg swelling. Psychiatric/Behavioral: Negative for decreased concentration. The patient is nervous/anxious. Prior to Visit Medications    Medication Sig Taking? Authorizing Provider   ARIPiprazole (ABILIFY) 5 MG tablet Take 1 tablet by mouth daily Yes Marcio Faustin MD   hydrOXYzine (ATARAX) 50 MG tablet Take 2 tablets by mouth every 8 hours as needed for Itching Yes Marcio Faustin MD   ALPRAZolam (XANAX) 0.5 MG tablet Take 0.5 mg by mouth 3 times daily as needed for Sleep or Anxiety.   Historical Provider, MD   lisinopril-hydroCHLOROthiazide (PRINZIDE;ZESTORETIC) 10-12.5 MG per tablet Take 1 tablet by mouth daily  Marcio Faustin MD   fluticasone (FLONASE) 50 MCG/ACT nasal spray 1 spray by Each Nostril awake  [x] Oriented to person/place/time [x]Able to follow commands      Eyes:  EOM    [x]  Normal  [] Abnormal-  Sclera  [x]  Normal  [] Abnormal -         Discharge []  None visible  [] Abnormal -    HENT:   [x] Normocephalic, atraumatic. [] Abnormal   [x] Mouth/Throat: Mucous membranes are moist.     External Ears [x] Normal  [] Abnormal-     Neck: [x] No visualized mass     Pulmonary/Chest: [x] Respiratory effort normal.  [x] No visualized signs of difficulty breathing or respiratory distress        [] Abnormal-      Musculoskeletal:   [] Normal gait with no signs of ataxia         [x] Normal range of motion of neck        [] Abnormal-       Neurological:        [] No Facial Asymmetry (Cranial nerve 7 motor function) (limited exam to video visit)          [] No gaze palsy        [] Abnormal-         Skin:        [x] No significant exanthematous lesions or discoloration noted on facial skin         [] Abnormal-            Psychiatric:       [] Normal Affect [] No Hallucinations        [x] Abnormal-  Anxious. Coherent. Has good judgement. Normal speech.    -     ASSESSMENT/PLAN:  Diagnoses and all orders for this visit:    PTSD (post-traumatic stress disorder)  -     ARIPiprazole (ABILIFY) 5 MG tablet; Take 1 tablet by mouth daily  -     hydrOXYzine (ATARAX) 50 MG tablet; Take 2 tablets by mouth every 8 hours as needed for Itching    Anxiety  -     ARIPiprazole (ABILIFY) 5 MG tablet; Take 1 tablet by mouth daily  -     hydrOXYzine (ATARAX) 50 MG tablet; Take 2 tablets by mouth every 8 hours as needed for Itching    Psychophysiological insomnia  -     ARIPiprazole (ABILIFY) 5 MG tablet; Take 1 tablet by mouth daily  -     hydrOXYzine (ATARAX) 50 MG tablet; Take 2 tablets by mouth every 8 hours as needed for Itching    Recurrent major depressive disorder, in partial remission (HCC)  -     ARIPiprazole (ABILIFY) 5 MG tablet; Take 1 tablet by mouth daily  -     hydrOXYzine (ATARAX) 50 MG tablet;  Take 2 tablets by mouth every 8 hours as needed for Itching      Will add abilify 5 mg. Continue pristiq. Increase atarax. Return in about 4 weeks (around 4/26/2021). Saji Guajardo, was evaluated through a synchronous (real-time) audio-video encounter. The patient (or guardian if applicable) is aware that this is a billable service. Verbal consent to proceed has been obtained within the past 12 months. The visit was conducted pursuant to the emergency declaration under the [address] [place] [address], [address] [place] [address] and the [employer] and [employer] General Act. Patient identification was verified, and a caregiver was present when appropriate. The patient was located in a state where the provider was credentialed to provide care. Total time spent on this encounter: 15-20 minutes    --Flora Fernandez MD on 3/29/2021 at 3:36 PM    An electronic signature was used to authenticate this note.

## 2021-03-31 DIAGNOSIS — F41.9 ANXIETY: Primary | ICD-10-CM

## 2021-03-31 RX ORDER — ALPRAZOLAM 0.5 MG/1
0.5 TABLET ORAL 3 TIMES DAILY PRN
Qty: 30 TABLET | Refills: 0 | Status: SHIPPED | OUTPATIENT
Start: 2021-03-31 | End: 2021-10-09 | Stop reason: SDUPTHER

## 2021-03-31 NOTE — TELEPHONE ENCOUNTER
Hattie calls and she wonders if she should maybe try the Abilify  in the AM , she has taken it at [facility] for 2 days and she says she was up most of the night anxious  Agitated , tense , nauseated until she finally took a Xanax then she could sleep. She wonders your thoughts ? Also she will need more Xanax as she will be out this weekend.

## 2021-03-31 NOTE — TELEPHONE ENCOUNTER
Informed Emily Molina she could take her Abilify in the AM and also her script was called in for Xanax.

## 2021-04-26 ENCOUNTER — OFFICE VISIT (OUTPATIENT)
Dept: FAMILY MEDICINE CLINIC | Age: 41
End: 2021-04-26
Payer: COMMERCIAL

## 2021-04-26 VITALS
BODY MASS INDEX: 34.54 KG/M2 | WEIGHT: 214 LBS | TEMPERATURE: 97.1 F | DIASTOLIC BLOOD PRESSURE: 70 MMHG | HEART RATE: 98 BPM | OXYGEN SATURATION: 99 % | SYSTOLIC BLOOD PRESSURE: 115 MMHG

## 2021-04-26 DIAGNOSIS — F51.04 PSYCHOPHYSIOLOGICAL INSOMNIA: ICD-10-CM

## 2021-04-26 DIAGNOSIS — F10.20 ALCOHOLISM (HCC): ICD-10-CM

## 2021-04-26 DIAGNOSIS — F41.9 ANXIETY: ICD-10-CM

## 2021-04-26 DIAGNOSIS — F33.41 RECURRENT MAJOR DEPRESSIVE DISORDER, IN PARTIAL REMISSION (HCC): Primary | ICD-10-CM

## 2021-04-26 DIAGNOSIS — F43.10 PTSD (POST-TRAUMATIC STRESS DISORDER): ICD-10-CM

## 2021-04-26 PROBLEM — R74.8 ELEVATED LIVER ENZYMES: Status: RESOLVED | Noted: 2020-10-16 | Resolved: 2021-04-26

## 2021-04-26 PROBLEM — F10.939 ALCOHOL USE WITH WITHDRAWAL (HCC): Status: RESOLVED | Noted: 2020-11-14 | Resolved: 2021-04-26

## 2021-04-26 PROCEDURE — 99214 OFFICE O/P EST MOD 30 MIN: CPT | Performed by: FAMILY MEDICINE

## 2021-04-26 RX ORDER — ARIPIPRAZOLE 10 MG/1
10 TABLET ORAL DAILY
Qty: 30 TABLET | Refills: 5 | Status: SHIPPED | OUTPATIENT
Start: 2021-04-26 | End: 2021-05-14 | Stop reason: SDUPTHER

## 2021-04-26 ASSESSMENT — ENCOUNTER SYMPTOMS: SHORTNESS OF BREATH: 0

## 2021-04-26 NOTE — PROGRESS NOTES
[address] [place] [address] [address], [address], [place]  Phone:  [phone]  [facility]:[phone]       Name: Sergei Costa  : 1980    Chief Complaint   Patient presents with    1 Month Follow-Up    Depression    Hypertension       HPI:     Sergei Costa is a 39 y.o. female who presents today for     HPI    About a month ago added Abilify 5 mg daily. This has helped with her depression and anxiety significantly. Patient has depression/PTSD, alcoholism struggles. She has been sober mid-2020. Saw counselor today and he saw a big difference. -2021 to see [facility] psychiatrist.   Sleep -- 10 pm to 3 am, will lay in bed till 430 am, sometimes will lay in bed untl 5 pm if she can catch another nap. May nap in pm.  Would like to be more active. Struggles with what people might think about her. Increased appetite. Tries to eat healthily. Has some sugary items. Xanax use -- picked up at 3/31, 30 and still has plenty she says. Smoking -- again. Current Outpatient Medications:     Misc Natural Products (GRAPE SEED COMPLEX PO), Take by mouth, Disp: , Rfl:     ARIPiprazole (ABILIFY) 10 MG tablet, Take 1 tablet by mouth daily, Disp: 30 tablet, Rfl: 5    ALPRAZolam (XANAX) 0.5 MG tablet, Take 1 tablet by mouth 3 times daily as needed for Sleep or Anxiety for up to 30 days. , Disp: 30 tablet, Rfl: 0    hydrOXYzine (ATARAX) 50 MG tablet, Take 2 tablets by mouth every 8 hours as needed for Itching, Disp: 150 tablet, Rfl: 3    lisinopril-hydroCHLOROthiazide (PRINZIDE;ZESTORETIC) 10-12.5 MG per tablet, Take 1 tablet by mouth daily, Disp: 90 tablet, Rfl: 3    fluticasone (FLONASE) 50 MCG/ACT nasal spray, 1 spray by Each Nostril route daily, Disp: 1 Bottle, Rfl: 5    Cholecalciferol (VITAMIN D) 50 MCG (2000 UT) CAPS capsule, Take by mouth, Disp: , Rfl:     Milk Thistle 1000 MG CAPS, Take by mouth, Disp: , Rfl:     magnesium 30 MG tablet, Take 30 mg by mouth 2 times daily, Disp: , Rfl:     Lactobacillus (PROBIOTIC ACIDOPHILUS PO), Take by mouth, Disp: , Rfl:     OREGANO PO, Take by mouth, Disp: , Rfl:     Garlic 10 MG CAPS, Take by mouth, Disp: , Rfl:     Turmeric 500 MG CAPS, Take by mouth, Disp: , Rfl:     Ascorbic Acid (VITAMIN C) 250 MG tablet, Take 250 mg by mouth daily, Disp: , Rfl:     DIGESTIVE ENZYMES PO, Take by mouth, Disp: , Rfl:     aspirin 81 MG EC tablet, Take 81 mg by mouth daily, Disp: , Rfl:     Multiple Vitamin (MULTIVITAMIN) TABS tablet, Take 1 tablet by mouth daily, Disp: 90 tablet, Rfl: 3    thiamine 100 MG tablet, Take 1 tablet by mouth daily, Disp: 90 tablet, Rfl: 1    folic acid (FOLVITE) 1 MG tablet, Take 1 tablet by mouth daily, Disp: 90 tablet, Rfl: 1    B Complex-Biotin-FA (SUPER B-50 COMPLEX) CAPS, 1 tablet daily, Disp: 100 capsule, Rfl: 3    desvenlafaxine succinate (PRISTIQ) 100 MG TB24 extended release tablet, Take 1 tablet by mouth daily, Disp: 90 tablet, Rfl: 3    Cetirizine HCl (ZYRTEC PO), Take 1 tablet by mouth daily, Disp: , Rfl:     Allergies   Allergen Reactions    Wellbutrin [Bupropion] Anaphylaxis    Trazodone And Nefazodone      Water retention, aches/pains, slight cough       Review of Systems   Constitutional: Negative for fatigue and fever. Respiratory: Negative for shortness of breath. Cardiovascular: Negative for chest pain and leg swelling. Objective:     /70 (Site: Left Upper Arm, Position: Sitting, Cuff Size: Large Adult)   Pulse 98   Temp 97.1 °F (36.2 °C) (Temporal)   Wt 214 lb (97.1 kg)   SpO2 99%   BMI 34.54 kg/m²     Physical Exam  Constitutional:       General: She is not in acute distress. Appearance: Normal appearance. She is not ill-appearing. Cardiovascular:      Rate and Rhythm: Normal rate and regular rhythm. Heart sounds: No murmur. Pulmonary:      Effort: Pulmonary effort is normal. No respiratory distress. Breath sounds: Normal breath sounds.  No

## 2021-05-04 NOTE — TELEPHONE ENCOUNTER
I would give 2 weeks and if still fatigue/sleepy, then I would split the Abilify 5 mg. We can also change it back to 5 mg if she gets done splitting and wants that dose.

## 2021-05-06 DIAGNOSIS — F10.20 ALCOHOLISM (HCC): ICD-10-CM

## 2021-05-06 RX ORDER — FOLIC ACID 1 MG/1
TABLET ORAL
Qty: 90 TABLET | Refills: 3 | Status: SHIPPED | OUTPATIENT
Start: 2021-05-06 | End: 2022-09-07

## 2021-05-06 RX ORDER — METHION/INOS/CHOL BT/B COM/LIV 110MG-86MG
CAPSULE ORAL
Qty: 90 TABLET | Refills: 3 | Status: SHIPPED | OUTPATIENT
Start: 2021-05-06 | End: 2022-09-07

## 2021-05-14 ENCOUNTER — TELEPHONE (OUTPATIENT)
Dept: FAMILY MEDICINE CLINIC | Age: 41
End: 2021-05-14

## 2021-05-14 DIAGNOSIS — F33.41 RECURRENT MAJOR DEPRESSIVE DISORDER, IN PARTIAL REMISSION (HCC): ICD-10-CM

## 2021-05-14 DIAGNOSIS — F43.10 PTSD (POST-TRAUMATIC STRESS DISORDER): ICD-10-CM

## 2021-05-14 DIAGNOSIS — F41.9 ANXIETY: ICD-10-CM

## 2021-05-14 DIAGNOSIS — F51.04 PSYCHOPHYSIOLOGICAL INSOMNIA: ICD-10-CM

## 2021-05-14 RX ORDER — ARIPIPRAZOLE 5 MG/1
5 TABLET ORAL DAILY
Qty: 90 TABLET | Refills: 3 | Status: SHIPPED | OUTPATIENT
Start: 2021-05-14 | End: 2022-03-04 | Stop reason: SDUPTHER

## 2021-05-14 NOTE — TELEPHONE ENCOUNTER
Erick Monae calls and it was 2 weeks last Tuesday that her Abilify was increased to 10 mg. She still remained fatigued , very tired , so she decreased it back to 5 mg and she says she's doing good. She had said you told her you would send in new script for 5 mg Abilify , she uses [facility] [address].

## 2021-05-25 ENCOUNTER — TELEPHONE (OUTPATIENT)
Dept: FAMILY MEDICINE CLINIC | Age: 41
End: 2021-05-25

## 2021-05-25 NOTE — TELEPHONE ENCOUNTER
She was given 30 tablets of xanax on 3/30. If she has enough until she sees Dr. Ann-Marie Vallejo, then she may use if needed until then. If she needs refill, then I would recommend an OV.

## 2021-05-25 NOTE — TELEPHONE ENCOUNTER
Spoke with the patient regarding the medication  Patient feels she has enough medication to get her to the appt June 7th with Dr Shubham Srinivasan  Patient is taking the Xanax as need for anxiety   Patient was just asking if she can take the Xanax as needed until her appt on June 7th  Recommended the patient to schedule an appt if any changes  Patient agreeable to this

## 2021-07-26 ENCOUNTER — OFFICE VISIT (OUTPATIENT)
Dept: FAMILY MEDICINE CLINIC | Age: 41
End: 2021-07-26
Payer: COMMERCIAL

## 2021-07-26 VITALS
WEIGHT: 205 LBS | HEART RATE: 89 BPM | OXYGEN SATURATION: 98 % | BODY MASS INDEX: 32.95 KG/M2 | SYSTOLIC BLOOD PRESSURE: 115 MMHG | HEIGHT: 66 IN | TEMPERATURE: 96.8 F | DIASTOLIC BLOOD PRESSURE: 72 MMHG

## 2021-07-26 DIAGNOSIS — F51.04 PSYCHOPHYSIOLOGICAL INSOMNIA: ICD-10-CM

## 2021-07-26 DIAGNOSIS — F43.10 PTSD (POST-TRAUMATIC STRESS DISORDER): ICD-10-CM

## 2021-07-26 DIAGNOSIS — F41.9 ANXIETY: ICD-10-CM

## 2021-07-26 DIAGNOSIS — F33.41 RECURRENT MAJOR DEPRESSIVE DISORDER, IN PARTIAL REMISSION (HCC): Primary | ICD-10-CM

## 2021-07-26 DIAGNOSIS — I10 ESSENTIAL HYPERTENSION: Chronic | ICD-10-CM

## 2021-07-26 PROCEDURE — 99214 OFFICE O/P EST MOD 30 MIN: CPT | Performed by: FAMILY MEDICINE

## 2021-07-26 RX ORDER — LISINOPRIL AND HYDROCHLOROTHIAZIDE 12.5; 1 MG/1; MG/1
TABLET ORAL
Qty: 90 TABLET | Refills: 3 | Status: SHIPPED | OUTPATIENT
Start: 2021-07-26 | End: 2021-11-02

## 2021-07-26 RX ORDER — PAROXETINE HYDROCHLORIDE 20 MG/1
40 TABLET, FILM COATED ORAL DAILY
COMMUNITY
Start: 2021-07-20 | End: 2021-11-02 | Stop reason: ALTCHOICE

## 2021-07-26 RX ORDER — CHLORAL HYDRATE 500 MG
1000 CAPSULE ORAL 2 TIMES DAILY
Qty: 90 CAPSULE | Refills: 3 | COMMUNITY
Start: 2021-07-26 | End: 2022-09-07

## 2021-07-26 RX ORDER — HYDROXYZINE 50 MG/1
50 TABLET, FILM COATED ORAL PRN
COMMUNITY
Start: 2021-03-17 | End: 2022-02-17

## 2021-07-26 NOTE — PROGRESS NOTES
Thistle 1000 MG CAPS, Take by mouth, Disp: , Rfl:     magnesium 30 MG tablet, Take 30 mg by mouth 2 times daily, Disp: , Rfl:     Lactobacillus (PROBIOTIC ACIDOPHILUS PO), Take by mouth, Disp: , Rfl:     OREGANO PO, Take by mouth, Disp: , Rfl:     Garlic 10 MG CAPS, Take by mouth, Disp: , Rfl:     Turmeric 500 MG CAPS, Take by mouth, Disp: , Rfl:     Ascorbic Acid (VITAMIN C) 250 MG tablet, Take 250 mg by mouth daily, Disp: , Rfl:     DIGESTIVE ENZYMES PO, Take by mouth, Disp: , Rfl:     aspirin 81 MG EC tablet, Take 81 mg by mouth daily, Disp: , Rfl:     Multiple Vitamin (MULTIVITAMIN) TABS tablet, Take 1 tablet by mouth daily, Disp: 90 tablet, Rfl: 3    B Complex-Biotin-FA (SUPER B-50 COMPLEX) CAPS, 1 tablet daily, Disp: 100 capsule, Rfl: 3    desvenlafaxine succinate (PRISTIQ) 100 MG TB24 extended release tablet, Take 1 tablet by mouth daily, Disp: 90 tablet, Rfl: 3    Cetirizine HCl (ZYRTEC PO), Take 1 tablet by mouth daily, Disp: , Rfl:     Allergies   Allergen Reactions    Wellbutrin [Bupropion] Anaphylaxis    Trazodone And Nefazodone      Water retention, aches/pains, slight cough       Review of Systems  No fever/chills. Objective:     /72 (Site: Left Upper Arm, Position: Sitting, Cuff Size: Large Adult)   Pulse 89   Temp 96.8 °F (36 °C) (Skin)   Ht 5' 6\" (1.676 m)   Wt 205 lb (93 kg)   SpO2 98%   BMI 33.09 kg/m²     Physical Exam  Gen: NAD, AAO x 3, coherent, pleasant  CTAB. RRR  Psych: smiles appropriately, mood a bit blunted but more content    Assessment/Plan:     Ivonne Smith was seen today for 3 month follow-up and depression. Diagnoses and all orders for this visit:    Recurrent major depressive disorder, in partial remission ([facility] [address]  -     Omega-3 Fatty Acids (FISH OIL) 1000 MG CAPS; Take 1 capsule by mouth 2 times daily    Psychophysiological insomnia  -     Omega-3 Fatty Acids (FISH OIL) 1000 MG CAPS;  Take 1 capsule by mouth 2 times daily    Anxiety  -     Omega-3 Fatty Acids (FISH OIL) 1000 MG CAPS; Take 1 capsule by mouth 2 times daily    PTSD (post-traumatic stress disorder)  -     Omega-3 Fatty Acids (FISH OIL) 1000 MG CAPS; Take 1 capsule by mouth 2 times daily    Essential hypertension  -     lisinopril-hydroCHLOROthiazide (PRINZIDE;ZESTORETIC) 10-12.5 MG per tablet; 1/2 po daily    much stable currently. Grieving is a bit different currently due to medications but she understands this best.  8 months sober from alcoholism  Pristiq 100+ Paxil 40+ Abilify 5 mg currently the treatment. She has added omega-3 to her vitamin B's. Xanax as mentioned above. She will call if she needs refills but she has got 9 tablets that she is only used about 20 in the last 6 months. We will order laboratories at the next office visit for early 2022 yearly labs    Return in about 3 months (around 10/26/2021). Future Appointments   Date Time Provider Luc Maru   11/2/2021 11:20 AM Ingrid Salas MD [facility] [place]          This office note has been dictated. Effort was made to review for errors but some may have been missed. Please contact Vonda Chowdhury of note for clarification if needed.        Electronically signed by Ingrid Salas MD on 7/26/2021 at 3:22 PM

## 2021-09-28 RX ORDER — CHOLECALCIFEROL (VITAMIN D3) 25 MCG
TABLET,CHEWABLE ORAL
Qty: 100 TABLET | Refills: 3 | Status: SHIPPED | OUTPATIENT
Start: 2021-09-28 | End: 2022-09-07

## 2021-10-08 DIAGNOSIS — F41.9 ANXIETY: ICD-10-CM

## 2021-10-08 NOTE — TELEPHONE ENCOUNTER
Garret Zuniga called requesting a refill on the following medications:  Requested Prescriptions     Pending Prescriptions Disp Refills    ALPRAZolam (XANAX) 0.5 MG tablet 30 tablet 0     Sig: Take 1 tablet by mouth 3 times daily as needed for Sleep or Anxiety for up to 30 days.        Date of last visit: 7/26/2021  Date of next visit (if applicable):Visit date not found  Date of last refill: 03/21/21  Pharmacy Name: [facility] Thanks, Marlan Olszewski, LPN

## 2021-10-09 RX ORDER — ALPRAZOLAM 0.5 MG/1
0.5 TABLET ORAL 3 TIMES DAILY PRN
Qty: 30 TABLET | Refills: 0 | Status: SHIPPED | OUTPATIENT
Start: 2021-10-09 | End: 2021-11-08

## 2021-11-02 ENCOUNTER — HOSPITAL ENCOUNTER (OUTPATIENT)
Age: 41
Discharge: HOME | End: 2021-11-02
Payer: COMMERCIAL

## 2021-11-02 ENCOUNTER — OFFICE VISIT (OUTPATIENT)
Dept: FAMILY MEDICINE CLINIC | Age: 41
End: 2021-11-02
Payer: COMMERCIAL

## 2021-11-02 VITALS
WEIGHT: 216 LBS | BODY MASS INDEX: 34.72 KG/M2 | SYSTOLIC BLOOD PRESSURE: 120 MMHG | HEART RATE: 104 BPM | TEMPERATURE: 97.2 F | DIASTOLIC BLOOD PRESSURE: 70 MMHG | HEIGHT: 66 IN | OXYGEN SATURATION: 98 %

## 2021-11-02 DIAGNOSIS — F33.41 RECURRENT MAJOR DEPRESSIVE DISORDER, IN PARTIAL REMISSION (HCC): ICD-10-CM

## 2021-11-02 DIAGNOSIS — F41.9 ANXIETY: ICD-10-CM

## 2021-11-02 DIAGNOSIS — R05.8 COUGH DUE TO ACE INHIBITOR: ICD-10-CM

## 2021-11-02 DIAGNOSIS — F17.200 CURRENT SMOKER: ICD-10-CM

## 2021-11-02 DIAGNOSIS — I10 ESSENTIAL HYPERTENSION: ICD-10-CM

## 2021-11-02 DIAGNOSIS — T46.4X5A COUGH DUE TO ACE INHIBITOR: ICD-10-CM

## 2021-11-02 DIAGNOSIS — R63.5 WEIGHT GAIN: ICD-10-CM

## 2021-11-02 DIAGNOSIS — F41.9 ANXIETY: Primary | ICD-10-CM

## 2021-11-02 LAB
ALBUMIN SERPL-MCNC: 4.8 G/DL (ref 3.5–5.1)
ALP BLD-CCNC: 74 U/L (ref 38–126)
ALT SERPL-CCNC: 13 U/L (ref 11–66)
ANION GAP SERPL CALCULATED.3IONS-SCNC: 12 MEQ/L (ref 8–16)
AST SERPL-CCNC: 16 U/L (ref 5–40)
BILIRUB SERPL-MCNC: 0.2 MG/DL (ref 0.3–1.2)
BUN BLDV-MCNC: 13 MG/DL (ref 7–22)
CALCIUM SERPL-MCNC: 9.8 MG/DL (ref 8.5–10.5)
CHLORIDE BLD-SCNC: 101 MEQ/L (ref 98–111)
CO2: 25 MEQ/L (ref 23–33)
CREAT SERPL-MCNC: 0.7 MG/DL (ref 0.4–1.2)
GFR SERPL CREATININE-BSD FRML MDRD: > 90 ML/MIN/1.73M2
GLUCOSE BLD-MCNC: 98 MG/DL (ref 70–108)
POTASSIUM SERPL-SCNC: 4.5 MEQ/L (ref 3.5–5.2)
SODIUM BLD-SCNC: 138 MEQ/L (ref 135–145)
TOTAL PROTEIN: 7 G/DL (ref 6.1–8)
TSH SERPL DL<=0.05 MIU/L-ACNC: 1.76 UIU/ML (ref 0.4–4.2)

## 2021-11-02 PROCEDURE — 82607 VITAMIN B-12: CPT

## 2021-11-02 PROCEDURE — 84443 ASSAY THYROID STIM HORMONE: CPT

## 2021-11-02 PROCEDURE — 36415 COLL VENOUS BLD VENIPUNCTURE: CPT

## 2021-11-02 PROCEDURE — 99214 OFFICE O/P EST MOD 30 MIN: CPT | Performed by: FAMILY MEDICINE

## 2021-11-02 PROCEDURE — 80053 COMPREHEN METABOLIC PANEL: CPT

## 2021-11-02 RX ORDER — LOSARTAN POTASSIUM AND HYDROCHLOROTHIAZIDE 12.5; 5 MG/1; MG/1
0.5 TABLET ORAL DAILY
Qty: 90 TABLET | Refills: 3 | Status: SHIPPED | OUTPATIENT
Start: 2021-11-02 | End: 2022-03-04 | Stop reason: SDUPTHER

## 2021-11-02 RX ORDER — MULTIVITAMIN WITH FOLIC ACID 400 MCG
TABLET ORAL
Qty: 90 TABLET | Refills: 3 | Status: SHIPPED | OUTPATIENT
Start: 2021-11-02 | End: 2022-09-07

## 2021-11-02 RX ORDER — DESVENLAFAXINE 100 MG/1
100 TABLET, EXTENDED RELEASE ORAL DAILY
Qty: 90 TABLET | Refills: 3 | Status: CANCELLED | OUTPATIENT
Start: 2021-11-02

## 2021-11-02 RX ORDER — ALPRAZOLAM 0.5 MG/1
0.5 TABLET ORAL 3 TIMES DAILY PRN
Qty: 30 TABLET | Refills: 0 | Status: CANCELLED | OUTPATIENT
Start: 2021-11-02 | End: 2021-12-02

## 2021-11-02 RX ORDER — VORTIOXETINE 10 MG/1
TABLET, FILM COATED ORAL
COMMUNITY
Start: 2021-09-29 | End: 2022-03-04 | Stop reason: DRUGHIGH

## 2021-11-02 RX ORDER — LOSARTAN POTASSIUM AND HYDROCHLOROTHIAZIDE 12.5; 5 MG/1; MG/1
0.5 TABLET ORAL DAILY
Qty: 15 TABLET | Refills: 0 | Status: SHIPPED | OUTPATIENT
Start: 2021-11-02 | End: 2022-03-04

## 2021-11-02 SDOH — ECONOMIC STABILITY: FOOD INSECURITY: WITHIN THE PAST 12 MONTHS, YOU WORRIED THAT YOUR FOOD WOULD RUN OUT BEFORE YOU GOT MONEY TO BUY MORE.: NEVER TRUE

## 2021-11-02 SDOH — ECONOMIC STABILITY: FOOD INSECURITY: WITHIN THE PAST 12 MONTHS, THE FOOD YOU BOUGHT JUST DIDN'T LAST AND YOU DIDN'T HAVE MONEY TO GET MORE.: NEVER TRUE

## 2021-11-02 ASSESSMENT — SOCIAL DETERMINANTS OF HEALTH (SDOH): HOW HARD IS IT FOR YOU TO PAY FOR THE VERY BASICS LIKE FOOD, HOUSING, MEDICAL CARE, AND HEATING?: NOT HARD AT ALL

## 2021-11-02 NOTE — TELEPHONE ENCOUNTER
Venkatesh Sheikh called requesting a refill on the following medications:  Requested Prescriptions     Pending Prescriptions Disp Refills    Multiple Vitamin (MULTIVITAMIN) tablet [Pharmacy Med Name: TAB A ANGELLA TABS] 90 tablet 3     Sig: TAKE 1 TABLET DAILY       Date of last visit: 7/26/2021  Date of next visit (if applicable):11/2/2021  Date of last refill: 11/25/21  Pharmacy Name: [facility],  Kathy Franco, JAREK

## 2021-11-02 NOTE — PROGRESS NOTES
Marcelina Officer (:  1980) is a 39 y.o. female,Established patient, here for evaluation of the following chief complaint(s):  3 Month Follow-Up (Questions about medications), Anxiety, and Depression         ASSESSMENT/PLAN:  1. Anxiety  -     TSH With Reflex Ft4; Future  -     Vitamin B12; Future  -     Comprehensive Metabolic Panel; Future  2. Recurrent major depressive disorder, in partial remission (HCC)  -     TSH With Reflex Ft4; Future  -     Vitamin B12; Future  -     Comprehensive Metabolic Panel; Future  3. Weight gain  -     TSH With Reflex Ft4; Future  -     Vitamin B12; Future  -     Comprehensive Metabolic Panel; Future  4. Cough due to ACE inhibitor  5. Current smoker  6. Essential hypertension  -     losartan-hydroCHLOROthiazide (HYZAAR) 50-12.5 MG per tablet; Take 0.5 tablets by mouth daily, Disp-90 tablet, R-3Normal  -     losartan-hydroCHLOROthiazide (HYZAAR) 50-12.5 MG per tablet; Take 0.5 tablets by mouth daily, Disp-15 tablet, R-0Normal    Will change BP medication due to cough from ACE-I. Encouraged healthy diet and activity levels. Blood work to rule out potential causes. Smoking cessation strongly encouraged  Proper use of xanax, lowest amount   Due to reported increase in cravings, I recommended that she have adjustment of medications for her depression or use medication for alcohol craving control. Return in about 4 months (around 3/2/2022). Subjective   SUBJECTIVE/OBJECTIVE:  HPI     Doing 'ok'. Still getting medication adjusted with psychiatrist.  Wendi Spicer for therapy 10/21/2021. Was doing okay then but since then feeling tired. Not sure if lazy or something else. Possible triggers of anxiety/depression:   Change of seasons, colder and gloomy weather. Brother suicide in . Holidays generate mixed emotions  Refilled xanax in March. Admits increased cravings for alcohol. Eating carbs and gained weight    genesight psychotropic meds reviewed.   Current meds are on good list -- Pristiq, abilify, xanax    Some congestion and coughing. Smoking. Wants to quit. Going to AA. She is not able to stop smoking as she works to stay off alcohol. Sleep -- some resting. Coffee helps in am    Review of Systems       Objective   Physical Exam        Lab Results   Component Value Date     01/14/2021    K 3.8 01/14/2021     01/14/2021    CO2 25 01/14/2021    BUN 9 01/14/2021    CREATININE 0.7 01/14/2021    GLUCOSE 82 01/14/2021    CALCIUM 9.9 01/14/2021      Lab Results   Component Value Date    TSH 2.370 07/11/2019             An electronic signature was used to authenticate this note.     --Alyssa Hale MD

## 2021-11-03 LAB — VITAMIN B-12: 525 PG/ML (ref 211–911)

## 2021-12-01 DIAGNOSIS — J30.1 SEASONAL ALLERGIC RHINITIS DUE TO POLLEN: ICD-10-CM

## 2021-12-01 RX ORDER — FLUTICASONE PROPIONATE 50 MCG
1 SPRAY, SUSPENSION (ML) NASAL DAILY
Qty: 1 EACH | Refills: 5 | Status: SHIPPED | OUTPATIENT
Start: 2021-12-01 | End: 2022-09-07 | Stop reason: SDUPTHER

## 2021-12-01 NOTE — TELEPHONE ENCOUNTER
Garret Zuniga needs refill of   Requested Prescriptions     Pending Prescriptions Disp Refills    fluticasone (FLONASE) 50 MCG/ACT nasal spray       Si spray by Each Nostril route daily       Last Filled on:  3/17/21 1*5    Last Visit Date:  2021    Next Visit Date:    3/4/2022

## 2022-02-17 RX ORDER — HYDROXYZINE 50 MG/1
TABLET, FILM COATED ORAL
Qty: 135 TABLET | Refills: 3 | Status: SHIPPED | OUTPATIENT
Start: 2022-02-17 | End: 2022-03-04 | Stop reason: SDUPTHER

## 2022-02-17 NOTE — TELEPHONE ENCOUNTER
Jesus Piedra needs refill of   Requested Prescriptions     Pending Prescriptions Disp Refills    hydrOXYzine (ATARAX) 50 MG tablet [Pharmacy Med Name: HYDROXYZINE HCL TABS 50MG] 135 tablet 3     Sig: TAKE ONE AND ONE-HALF TABLETS NIGHTLY       Last Filled on:  3/29/21 150*3    Last Visit Date:  11/2/2021    Next Visit Date:    3/4/2022

## 2022-03-04 ENCOUNTER — OFFICE VISIT (OUTPATIENT)
Dept: FAMILY MEDICINE CLINIC | Age: 42
End: 2022-03-04
Payer: COMMERCIAL

## 2022-03-04 VITALS
OXYGEN SATURATION: 99 % | TEMPERATURE: 98.6 F | DIASTOLIC BLOOD PRESSURE: 90 MMHG | WEIGHT: 227 LBS | HEIGHT: 66 IN | HEART RATE: 92 BPM | BODY MASS INDEX: 36.48 KG/M2 | SYSTOLIC BLOOD PRESSURE: 132 MMHG

## 2022-03-04 DIAGNOSIS — R35.0 URINE FREQUENCY: ICD-10-CM

## 2022-03-04 DIAGNOSIS — F51.04 PSYCHOPHYSIOLOGICAL INSOMNIA: ICD-10-CM

## 2022-03-04 DIAGNOSIS — I10 ESSENTIAL HYPERTENSION: ICD-10-CM

## 2022-03-04 DIAGNOSIS — F41.9 ANXIETY: ICD-10-CM

## 2022-03-04 DIAGNOSIS — F33.41 RECURRENT MAJOR DEPRESSIVE DISORDER, IN PARTIAL REMISSION (HCC): Primary | ICD-10-CM

## 2022-03-04 DIAGNOSIS — F43.10 PTSD (POST-TRAUMATIC STRESS DISORDER): ICD-10-CM

## 2022-03-04 DIAGNOSIS — R23.2 HOT FLASHES: ICD-10-CM

## 2022-03-04 LAB
BILIRUBIN, POC: NEGATIVE
BLOOD URINE, POC: NEGATIVE
CLARITY, POC: CLEAR
COLOR, POC: YELLOW
GLUCOSE URINE, POC: NEGATIVE
KETONES, POC: NEGATIVE
LEUKOCYTE EST, POC: NEGATIVE
NITRITE, POC: NEGATIVE
PH, POC: 0.2
PROTEIN, POC: NEGATIVE
SPECIFIC GRAVITY, POC: >=1.005
UROBILINOGEN, POC: 0.2

## 2022-03-04 PROCEDURE — 99214 OFFICE O/P EST MOD 30 MIN: CPT | Performed by: FAMILY MEDICINE

## 2022-03-04 PROCEDURE — 81003 URINALYSIS AUTO W/O SCOPE: CPT | Performed by: FAMILY MEDICINE

## 2022-03-04 RX ORDER — HYDROXYZINE 50 MG/1
TABLET, FILM COATED ORAL
Qty: 135 TABLET | Refills: 3 | Status: SHIPPED | OUTPATIENT
Start: 2022-03-04 | End: 2022-09-07 | Stop reason: SDUPTHER

## 2022-03-04 RX ORDER — LOSARTAN POTASSIUM AND HYDROCHLOROTHIAZIDE 12.5; 5 MG/1; MG/1
0.5 TABLET ORAL DAILY
Qty: 90 TABLET | Refills: 3 | Status: SHIPPED | OUTPATIENT
Start: 2022-03-04 | End: 2022-09-07 | Stop reason: SDUPTHER

## 2022-03-04 RX ORDER — VORTIOXETINE 20 MG/1
TABLET, FILM COATED ORAL
COMMUNITY
Start: 2022-01-11

## 2022-03-04 RX ORDER — ARIPIPRAZOLE 5 MG/1
5 TABLET ORAL DAILY
Qty: 90 TABLET | Refills: 3 | Status: SHIPPED | OUTPATIENT
Start: 2022-03-04 | End: 2022-09-07 | Stop reason: SDUPTHER

## 2022-03-04 ASSESSMENT — PATIENT HEALTH QUESTIONNAIRE - PHQ9
4. FEELING TIRED OR HAVING LITTLE ENERGY: 0
9. THOUGHTS THAT YOU WOULD BE BETTER OFF DEAD, OR OF HURTING YOURSELF: 0
6. FEELING BAD ABOUT YOURSELF - OR THAT YOU ARE A FAILURE OR HAVE LET YOURSELF OR YOUR FAMILY DOWN: 0
3. TROUBLE FALLING OR STAYING ASLEEP: 0
1. LITTLE INTEREST OR PLEASURE IN DOING THINGS: 0
SUM OF ALL RESPONSES TO PHQ QUESTIONS 1-9: 0
SUM OF ALL RESPONSES TO PHQ9 QUESTIONS 1 & 2: 0
SUM OF ALL RESPONSES TO PHQ QUESTIONS 1-9: 0
10. IF YOU CHECKED OFF ANY PROBLEMS, HOW DIFFICULT HAVE THESE PROBLEMS MADE IT FOR YOU TO DO YOUR WORK, TAKE CARE OF THINGS AT HOME, OR GET ALONG WITH OTHER PEOPLE: 0
SUM OF ALL RESPONSES TO PHQ QUESTIONS 1-9: 0
8. MOVING OR SPEAKING SO SLOWLY THAT OTHER PEOPLE COULD HAVE NOTICED. OR THE OPPOSITE, BEING SO FIGETY OR RESTLESS THAT YOU HAVE BEEN MOVING AROUND A LOT MORE THAN USUAL: 0
SUM OF ALL RESPONSES TO PHQ QUESTIONS 1-9: 0
2. FEELING DOWN, DEPRESSED OR HOPELESS: 0
7. TROUBLE CONCENTRATING ON THINGS, SUCH AS READING THE NEWSPAPER OR WATCHING TELEVISION: 0
5. POOR APPETITE OR OVEREATING: 0

## 2022-03-04 ASSESSMENT — ENCOUNTER SYMPTOMS: SHORTNESS OF BREATH: 0

## 2022-03-04 NOTE — PATIENT INSTRUCTIONS
Add more fiber and healthy fats to your day to help with weight and satiety. Aim for 5 grams of fiber and some protein in your snacks/meals. -- vegetables (cruciferous vegetables)  -- legumes (soup bean)  -- nuts/seeds  -- whole grains  -- fruit     Aim for 25 grams of fiber or more per day    Aim for < 25 grams of added sugars per day. Check out Kefir to help with bowel function and overall health for probiotic options. Kefir 1/2 cup + flax seed meal 1 Tbsp + 1/2 cup fruit (optional) -- daily for 1 week. If not getting good enough results with digestion, then double up on Kefir and flax seed meal.   Once you have obtained a couple of weeks of healthier digestive and intestinal results   then use 3-4 times a week. If having too many bowel movements a day, then may decrease amount or frequency further.

## 2022-03-04 NOTE — PROGRESS NOTES
Sherita Booth (:  1980) is a 43 y.o. female,Established patient, here for evaluation of the following chief complaint(s):  Follow-up, Hypertension, Urinary Frequency (Thirsty), and Other (Hot flashes / irritable)         ASSESSMENT/PLAN:  1. Recurrent major depressive disorder, in partial remission (HCC)  -     ARIPiprazole (ABILIFY) 5 MG tablet; Take 1 tablet by mouth daily, Disp-90 tablet, R-3Normal  2. Essential hypertension  -     losartan-hydroCHLOROthiazide (HYZAAR) 50-12.5 MG per tablet; Take 0.5 tablets by mouth daily, Disp-90 tablet, R-3Normal  3. Anxiety  -     ARIPiprazole (ABILIFY) 5 MG tablet; Take 1 tablet by mouth daily, Disp-90 tablet, R-3Normal  -     hydrOXYzine (ATARAX) 50 MG tablet; TAKE ONE AND ONE-HALF TABLETS NIGHTLY, Disp-135 tablet, R-3Normal  4. Psychophysiological insomnia  -     ARIPiprazole (ABILIFY) 5 MG tablet; Take 1 tablet by mouth daily, Disp-90 tablet, R-3Normal  5. PTSD (post-traumatic stress disorder)  -     ARIPiprazole (ABILIFY) 5 MG tablet; Take 1 tablet by mouth daily, Disp-90 tablet, R-3Normal  6. Urine frequency  -     POCT Urinalysis No Micro (Auto)  7. Hot flashes    Continue with current medication  UA here benign. Recommended addressing constipation to see if urinary issues improve.  kegel exercises. Diet with more fiber, heatlhy fats and probiotic may help. Good water intake. pristiq vs hormonal effects. Consider further work up if continues    Return in about 4 months (around 2022). Subjective   SUBJECTIVE/OBJECTIVE:  HPI     Waiting for spring. Boredom and frustration contribute to eating. However Feels medications are working for her depression and anxiety. Still sober. Activities that she enjoys. Pottery class in the winter has helped ([employer]). Counseling  -- will be backing off during warmer months. Gaining weight -- less activity, diet okay. More in the middle. Carbs - pasta, pizza. Eats other healthy things. Urination issues, mostly frequency. Constipation as well  Has to take stool softener. Not taking probiotic due to money. Hot flashes -- has contraception on board and has irregular bleeding. Weight gain. On pristiq. Review of Systems   Constitutional: Negative for fatigue and fever. Respiratory: Negative for shortness of breath. Cardiovascular: Negative for chest pain and leg swelling. Lab Results   Component Value Date    TSH 1.760 11/02/2021         Objective   Physical Exam  Constitutional:       General: She is not in acute distress. Appearance: Normal appearance. She is not ill-appearing. Cardiovascular:      Rate and Rhythm: Normal rate and regular rhythm. Heart sounds: No murmur heard. Pulmonary:      Effort: Pulmonary effort is normal. No respiratory distress. Breath sounds: Normal breath sounds. No wheezing. Musculoskeletal:         General: No swelling. Neurological:      Mental Status: She is alert and oriented to person, place, and time. Psychiatric:      Comments: Smiles, calm and pleasant. Good insight and judgement. Lab Results   Component Value Date     11/02/2021    K 4.5 11/02/2021     11/02/2021    CO2 25 11/02/2021    BUN 13 11/02/2021    CREATININE 0.7 11/02/2021    GLUCOSE 98 11/02/2021    CALCIUM 9.8 11/02/2021         An electronic signature was used to authenticate this note.     --Vanessa Oliver MD

## 2022-06-10 ENCOUNTER — OFFICE VISIT (OUTPATIENT)
Dept: FAMILY MEDICINE CLINIC | Age: 42
End: 2022-06-10
Payer: COMMERCIAL

## 2022-06-10 ENCOUNTER — HOSPITAL ENCOUNTER (OUTPATIENT)
Age: 42
Discharge: HOME | End: 2022-06-10
Payer: COMMERCIAL

## 2022-06-10 VITALS
HEIGHT: 66 IN | RESPIRATION RATE: 14 BRPM | TEMPERATURE: 97.1 F | OXYGEN SATURATION: 97 % | HEART RATE: 74 BPM | DIASTOLIC BLOOD PRESSURE: 88 MMHG | SYSTOLIC BLOOD PRESSURE: 132 MMHG | WEIGHT: 233 LBS | BODY MASS INDEX: 37.45 KG/M2

## 2022-06-10 DIAGNOSIS — R60.0 BILATERAL LEG EDEMA: ICD-10-CM

## 2022-06-10 DIAGNOSIS — R60.0 BILATERAL LEG EDEMA: Primary | ICD-10-CM

## 2022-06-10 LAB
D-DIMER QUANTITATIVE: 439 NG/ML FEU (ref 0–500)
PRO-BNP: 25.8 PG/ML (ref 0–450)

## 2022-06-10 PROCEDURE — 36415 COLL VENOUS BLD VENIPUNCTURE: CPT

## 2022-06-10 PROCEDURE — 99213 OFFICE O/P EST LOW 20 MIN: CPT | Performed by: NURSE PRACTITIONER

## 2022-06-10 PROCEDURE — 85379 FIBRIN DEGRADATION QUANT: CPT

## 2022-06-10 PROCEDURE — 83880 ASSAY OF NATRIURETIC PEPTIDE: CPT

## 2022-06-10 RX ORDER — DOXEPIN HYDROCHLORIDE 25 MG/1
CAPSULE ORAL
COMMUNITY
Start: 2022-04-05 | End: 2022-06-10

## 2022-06-10 RX ORDER — PRAZOSIN HYDROCHLORIDE 1 MG/1
CAPSULE ORAL
COMMUNITY
Start: 2022-05-31

## 2022-06-10 ASSESSMENT — ENCOUNTER SYMPTOMS
WHEEZING: 0
RHINORRHEA: 0
EYE ITCHING: 0
ABDOMINAL PAIN: 0
DIARRHEA: 0
VOMITING: 0
TROUBLE SWALLOWING: 0
SINUS PRESSURE: 0
EYE DISCHARGE: 0
EYE PAIN: 0
CHEST TIGHTNESS: 0
BACK PAIN: 0
COUGH: 0
SHORTNESS OF BREATH: 0
NAUSEA: 0
CONSTIPATION: 0
SORE THROAT: 0
EYE REDNESS: 0

## 2022-06-10 NOTE — PROGRESS NOTES
Leigh Ann Fernández (:  1980) is a 43 y.o. female,Established patient, here for evaluation of the following chief complaint(s):  Leg Swelling (x 2 weeks)         ASSESSMENT/PLAN:  1. Bilateral leg edema  -     Brain Natriuretic Peptide; Future  -     D-Dimer, Quantitative; Future    Started Prazosin for nightmares 2 weeks ago. Bilateral leg edema started a couple days after. She notes the medications are helping with sleep. Will check some lab. May need to DC medications if doesn't improve or gets worse. As of now, she is enjoying the sleep and not too bothered by the swelling. Return if symptoms worsen or fail to improve. Subjective   SUBJECTIVE/OBJECTIVE:  HPI  Bilateral lower extremity edema x 2weeks. Notes just started after taking a new medications. She denies cp/sob/weakness/fatigue/leg pain. Notes her socks feel tight. Review of Systems   Constitutional: Negative for activity change, appetite change, chills, fatigue and fever. HENT: Negative for congestion, ear discharge, ear pain, hearing loss, postnasal drip, rhinorrhea, sinus pressure, sore throat and trouble swallowing. Eyes: Negative for pain, discharge, redness and itching. Respiratory: Negative for cough, chest tightness, shortness of breath and wheezing. Cardiovascular: Positive for leg swelling. Negative for chest pain and palpitations. Gastrointestinal: Negative for abdominal pain, constipation, diarrhea, nausea and vomiting. Endocrine: Negative. Genitourinary: Negative for dysuria, flank pain, frequency and hematuria. Musculoskeletal: Negative for arthralgias, back pain, joint swelling and myalgias. Skin: Negative. Neurological: Negative for dizziness, light-headedness and headaches. Hematological: Negative. Objective   Physical Exam  Vitals reviewed. Constitutional:       General: She is not in acute distress. Appearance: Normal appearance. HENT:      Head: Normocephalic and atraumatic. Cardiovascular:      Rate and Rhythm: Normal rate and regular rhythm. Heart sounds: No murmur heard. No friction rub. Pulmonary:      Effort: Pulmonary effort is normal.      Breath sounds: Normal breath sounds. No wheezing, rhonchi or rales. Musculoskeletal:      Right lower leg: Edema (mild 1+ non-pitting edema. ) present. Left lower leg: Edema present. Skin:     General: Skin is warm and dry. Neurological:      Mental Status: She is alert. An electronic signature was used to authenticate this note.     --MAURICE Gutierrez - CNP

## 2022-06-13 ENCOUNTER — TELEPHONE (OUTPATIENT)
Dept: FAMILY MEDICINE CLINIC | Age: 42
End: 2022-06-13

## 2022-06-13 NOTE — TELEPHONE ENCOUNTER
----- Message from MAURICE Cleary CNP sent at 6/13/2022  8:14 AM EDT -----  Lab work is good. No concerns for DVT.  If the swelling becomes more of an issue we can try compression stockings or you may need to discontinue to Prazosin

## 2022-09-07 ENCOUNTER — OFFICE VISIT (OUTPATIENT)
Dept: FAMILY MEDICINE CLINIC | Age: 42
End: 2022-09-07
Payer: COMMERCIAL

## 2022-09-07 VITALS
WEIGHT: 227 LBS | TEMPERATURE: 98 F | DIASTOLIC BLOOD PRESSURE: 78 MMHG | BODY MASS INDEX: 36.48 KG/M2 | HEART RATE: 82 BPM | OXYGEN SATURATION: 98 % | HEIGHT: 66 IN | SYSTOLIC BLOOD PRESSURE: 122 MMHG

## 2022-09-07 DIAGNOSIS — F51.04 PSYCHOPHYSIOLOGICAL INSOMNIA: ICD-10-CM

## 2022-09-07 DIAGNOSIS — R60.0 BILATERAL LEG EDEMA: ICD-10-CM

## 2022-09-07 DIAGNOSIS — F43.10 PTSD (POST-TRAUMATIC STRESS DISORDER): ICD-10-CM

## 2022-09-07 DIAGNOSIS — I10 ESSENTIAL HYPERTENSION: Primary | ICD-10-CM

## 2022-09-07 DIAGNOSIS — F33.41 RECURRENT MAJOR DEPRESSIVE DISORDER, IN PARTIAL REMISSION (HCC): ICD-10-CM

## 2022-09-07 DIAGNOSIS — J30.1 SEASONAL ALLERGIC RHINITIS DUE TO POLLEN: ICD-10-CM

## 2022-09-07 DIAGNOSIS — M76.62 LEFT ACHILLES TENDINITIS: ICD-10-CM

## 2022-09-07 DIAGNOSIS — F41.9 ANXIETY: ICD-10-CM

## 2022-09-07 PROCEDURE — 99214 OFFICE O/P EST MOD 30 MIN: CPT | Performed by: FAMILY MEDICINE

## 2022-09-07 RX ORDER — FLUTICASONE PROPIONATE 50 MCG
1 SPRAY, SUSPENSION (ML) NASAL DAILY
Qty: 1 EACH | Refills: 11 | Status: SHIPPED | OUTPATIENT
Start: 2022-09-07

## 2022-09-07 RX ORDER — ARIPIPRAZOLE 5 MG/1
5 TABLET ORAL DAILY
Qty: 90 TABLET | Refills: 3 | Status: SHIPPED | OUTPATIENT
Start: 2022-09-07

## 2022-09-07 RX ORDER — LOSARTAN POTASSIUM AND HYDROCHLOROTHIAZIDE 12.5; 5 MG/1; MG/1
0.5 TABLET ORAL DAILY
Qty: 90 TABLET | Refills: 3 | Status: SHIPPED | OUTPATIENT
Start: 2022-09-07

## 2022-09-07 RX ORDER — HYDROXYZINE HYDROCHLORIDE 25 MG/1
25 TABLET, FILM COATED ORAL NIGHTLY
Qty: 90 TABLET | Refills: 3 | Status: SHIPPED | OUTPATIENT
Start: 2022-09-07 | End: 2023-09-07

## 2022-09-07 RX ORDER — DESVENLAFAXINE 100 MG/1
100 TABLET, EXTENDED RELEASE ORAL DAILY
Qty: 90 TABLET | Refills: 3 | Status: SHIPPED | OUTPATIENT
Start: 2022-09-07

## 2022-09-07 NOTE — PROGRESS NOTES
Marcelina Officer (:  1980) is a 43 y.o. female,Established patient, here for evaluation of the following chief complaint(s):  6 Month Follow-Up, Depression, and Foot Pain (Left foot back of heel)         ASSESSMENT/PLAN:  1. Essential hypertension  -     losartan-hydroCHLOROthiazide (HYZAAR) 50-12.5 MG per tablet; Take 0.5 tablets by mouth daily, Disp-90 tablet, R-3Normal  2. Anxiety  -     ARIPiprazole (ABILIFY) 5 MG tablet; Take 1 tablet by mouth daily, Disp-90 tablet, R-3Normal  -     hydrOXYzine HCl (ATARAX) 25 MG tablet; Take 1 tablet by mouth at bedtime, Disp-90 tablet, R-3Normal  -     desvenlafaxine succinate (PRISTIQ) 100 MG TB24 extended release tablet; Take 1 tablet by mouth daily, Disp-90 tablet, R-3Normal  3. Psychophysiological insomnia  -     ARIPiprazole (ABILIFY) 5 MG tablet; Take 1 tablet by mouth daily, Disp-90 tablet, R-3Normal  4. PTSD (post-traumatic stress disorder)  -     ARIPiprazole (ABILIFY) 5 MG tablet; Take 1 tablet by mouth daily, Disp-90 tablet, R-3Normal  5. Recurrent major depressive disorder, in partial remission (HCC)  -     ARIPiprazole (ABILIFY) 5 MG tablet; Take 1 tablet by mouth daily, Disp-90 tablet, R-3Normal  -     desvenlafaxine succinate (PRISTIQ) 100 MG TB24 extended release tablet; Take 1 tablet by mouth daily, Disp-90 tablet, R-3Normal  6. Seasonal allergic rhinitis due to pollen  -     fluticasone (FLONASE) 50 MCG/ACT nasal spray; 1 spray by Each Nostril route daily, Disp-1 each, R-11Normal  7. Left Achilles tendinitis  8. Bilateral leg edema    Patient overall doing much better. She has been sober for a while. She is relieved to have a break from her nightmares with the prazosin. She will continue on current medications except atarax dose will be lowered to 25 mg from 50 mg. Additionally, we discussed changing abilify to 2 mg orally if sleepiness during the day continues. Has a light for light therapy to be used during dark months.   Recommend taking vitamin D 2000 units daily  Good Omega 3 fatty acid intake recommended. She also plans to engage in something new and artistic such as a sculptor class that she did last year during the winter which was a healthy distraction. She will do ice therapy to [employer] for a couple weeks or if needed. The exercises given will be done for the next 4 weeks or so. She will let us know she is not improving. Good shoe support is recommended. For the leg edema, I recommended that she focus on a low sodium diet but still include flavors from herbs and spices. More walking or activity that involves leg muscles can also be helpful. Elevation of legs when sitting down and even using MARIANA hose would be fine for now. Return in about 6 months (around 3/7/2023). Subjective   SUBJECTIVE/OBJECTIVE:  HPI    Patient following for hypertension and depression/anxiety, history of alcohol abuse and PTSD. She also notes some leg edema. Has been enjoying summer with gardening. Following with [facility] psychiatry as well. They have added prazosin and this has made a world of difference. She is able to rest without the nightmares waking her up. Therapist sees her more during colder months. But insurance won't cover. She is not sure she will use this during the winter months. She admits that sometimes she gets feeling of Blah and then gets angry at that, about once a week. May last 1/2 a day to a full day. Sleep helps. Sleeping too much she thinks 9-11 hours, abilify + prazosin 1 mg  (PTSD) + atarax 10 mg evening. Trintellix 20 mg nightly   Wt Readings from Last 3 Encounters:   09/07/22 227 lb (103 kg)   06/10/22 233 lb (105.7 kg)   03/04/22 227 lb (103 kg)   Leg edema is mild. She had seen the nurse practitioner for such a couple months ago and laboratories were unremarkable. It seemed to start after the prazosin. She sees such benefit with the medication that she does not want to stop it.   Salt intake has not been measured but she can imagine that is higher than it should be. She is overall okay on fluid intake. Physical activity has been mostly gardening but not specific exercise routine. She has not worn MARIANA hose in the past.    Foot and ankle pain on the left  3 months ago had stepped into rabbit hole. When driving pain in left foot will worsen. If working a lot, then more pain. Resting helps. Review of Systems     No fevers or chills. No shortness of breath or chest pain. Objective   Physical Exam     In general she appears to be in no acute distress, awake alert and oriented. Lungs are clear to auscultation and the heart regular rate and rhythm. Psych: Patient seems to be more at ease and content. But still little anxious and quiet at time Insight and judgment seem good. Left foot shows no deformity but she has tenderness at the Achilles tendon including the insertion into the bone. Ankle is normal and stable but she does have some discomfort with side to side movement against resistance. Flexibility of foot is okay but causes some pain. Trace edema bilaterally in legs    Lab Results   Component Value Date    TSH 1.760 11/02/2021     Lab Results   Component Value Date     11/02/2021    K 4.5 11/02/2021     11/02/2021    CO2 25 11/02/2021    BUN 13 11/02/2021    CREATININE 0.7 11/02/2021    GLUCOSE 98 11/02/2021    CALCIUM 9.8 11/02/2021    PROT 7.0 11/02/2021    LABALBU 4.8 11/02/2021    BILITOT 0.2 (L) 11/02/2021    ALKPHOS 74 11/02/2021    AST 16 11/02/2021    ALT 13 11/02/2021    LABGLOM >90 11/02/2021           An electronic signature was used to authenticate this note.     --Amilcar Dawson MD

## 2022-09-07 NOTE — PATIENT INSTRUCTIONS
Add back vitamin D 3 2000 units daily     Increase Omega 3 fatty acid foods to improve your health  -- fatty fish (salmon, tuna, shrimp, cod)  -- also walnuts, flax seed meal and/or cassandra. Ice area of left achilles -- end of day -- 5 minutes to 10 minutes. Then take a break for 5 minutes and repeat again. Do this for 2 weeks or so. Exercises above daily if able for 4 weeks to build up tendons and joints around injured area.

## 2022-09-11 PROBLEM — R60.0 BILATERAL LEG EDEMA: Status: ACTIVE | Noted: 2022-09-11

## 2022-09-11 PROBLEM — M76.62 LEFT ACHILLES TENDINITIS: Status: ACTIVE | Noted: 2022-09-11

## 2022-10-21 ENCOUNTER — TELEMEDICINE (OUTPATIENT)
Dept: FAMILY MEDICINE CLINIC | Age: 42
End: 2022-10-21
Payer: COMMERCIAL

## 2022-10-21 DIAGNOSIS — G43.909 MIGRAINE WITHOUT STATUS MIGRAINOSUS, NOT INTRACTABLE, UNSPECIFIED MIGRAINE TYPE: ICD-10-CM

## 2022-10-21 DIAGNOSIS — B96.89 ACUTE BACTERIAL SINUSITIS: Primary | ICD-10-CM

## 2022-10-21 DIAGNOSIS — J01.90 ACUTE BACTERIAL SINUSITIS: Primary | ICD-10-CM

## 2022-10-21 PROCEDURE — 99213 OFFICE O/P EST LOW 20 MIN: CPT | Performed by: NURSE PRACTITIONER

## 2022-10-21 RX ORDER — DIPHENHYDRAMINE HCL 25 MG
25 CAPSULE ORAL EVERY 6 HOURS PRN
Qty: 15 CAPSULE | Refills: 0 | Status: SHIPPED | OUTPATIENT
Start: 2022-10-21 | End: 2022-10-31

## 2022-10-21 RX ORDER — CEFDINIR 300 MG/1
300 CAPSULE ORAL 2 TIMES DAILY
Qty: 14 CAPSULE | Refills: 0 | Status: SHIPPED | OUTPATIENT
Start: 2022-10-21 | End: 2022-10-28

## 2022-10-21 RX ORDER — KETOROLAC TROMETHAMINE 10 MG/1
10 TABLET, FILM COATED ORAL EVERY 6 HOURS PRN
Qty: 20 TABLET | Refills: 0 | Status: SHIPPED | OUTPATIENT
Start: 2022-10-21 | End: 2023-10-21

## 2022-10-21 ASSESSMENT — ENCOUNTER SYMPTOMS
COUGH: 1
SINUS PAIN: 1
SINUS PRESSURE: 1
GASTROINTESTINAL NEGATIVE: 1

## 2022-10-21 NOTE — PROGRESS NOTES
Brenda Jc (:  1980) is a Established patient, here for evaluation of the following:    Assessment & Plan   Below is the assessment and plan developed based on review of pertinent history, physical exam, labs, studies, and medications. 1. Acute bacterial sinusitis  -     cefdinir (OMNICEF) 300 MG capsule; Take 1 capsule by mouth 2 times daily for 7 days, Disp-14 capsule, R-0Normal  2. Migraine without status migrainosus, not intractable, unspecified migraine type  -     ketorolac (TORADOL) 10 MG tablet; Take 1 tablet by mouth every 6 hours as needed for Pain, Disp-20 tablet, R-0Normal  -     diphenhydrAMINE (BENADRYL ALLERGY) 25 MG capsule; Take 1 capsule by mouth every 6 hours as needed for Itching, Disp-15 capsule, R-0Normal  Return if symptoms worsen or fail to improve. Increase fluids. Rest in a dark cool room    Subjective   Cough  Associated symptoms include postnasal drip. Pertinent negatives include no chills or fever. Hattie consents to a VV. She is identified by name and . She notes 3 weeks of sinus congestion, headache, and PND. She is taking OTC pain medications which are not helping. Pain under her eyes and across her forehead. Review of Systems   Constitutional:  Negative for chills, fatigue and fever. HENT:  Positive for congestion, postnasal drip, sinus pressure and sinus pain. Respiratory:  Positive for cough. Cardiovascular: Negative. Gastrointestinal: Negative. Genitourinary: Negative. Musculoskeletal: Negative. Neurological:  Negative for dizziness, weakness and light-headedness.         Objective   Patient-Reported Vitals  No data recorded     Physical Exam  [INSTRUCTIONS:  \"[x]\" Indicates a positive item  \"[]\" Indicates a negative item  -- DELETE ALL ITEMS NOT EXAMINED]    Constitutional: [x] Appears well-developed and well-nourished [x] No apparent distress      [] Abnormal -     Mental status: [x] Alert and awake  [x] Oriented to person/place/time [x] Able to follow commands    [] Abnormal -     Eyes:   EOM    [x]  Normal    [] Abnormal -   Sclera  [x]  Normal    [] Abnormal -          Discharge [x]  None visible   [] Abnormal -     HENT: [x] Normocephalic, atraumatic  [] Abnormal -   [x] Mouth/Throat: Mucous membranes are moist    External Ears [x] Normal  [] Abnormal -    Neck: [x] No visualized mass [] Abnormal -     Pulmonary/Chest: [x] Respiratory effort normal   [x] No visualized signs of difficulty breathing or respiratory distress        [] Abnormal -      Musculoskeletal:   [x] Normal gait with no signs of ataxia         [x] Normal range of motion of neck        [] Abnormal -     Neurological:        [x] No Facial Asymmetry (Cranial nerve 7 motor function) (limited exam due to video visit)          [x] No gaze palsy        [] Abnormal -          Skin:        [x] No significant exanthematous lesions or discoloration noted on facial skin         [] Abnormal -            Psychiatric:       [x] Normal Affect [] Abnormal -        [x] No Hallucinations    Other pertinent observable physical exam findings: Olegario Mayfield, was evaluated through a synchronous (real-time) audio-video encounter. The patient (or guardian if applicable) is aware that this is a billable service, which includes applicable co-pays. This Virtual Visit was conducted with patient's (and/or legal guardian's) consent. The visit was conducted pursuant to the emergency declaration under the [facility], [address] [place] [address] authority and the opendorse and [employer] General Act. Patient identification was verified, and a caregiver was present when appropriate. The patient was located at Home: [address] [place] [address]. Provider was located at [facility] ([address] [place] [address] Dept): [address] [place] [address],  [address] [facility], APRN - CNP

## 2022-12-22 ENCOUNTER — HOSPITAL ENCOUNTER (OUTPATIENT)
Age: 42
Discharge: HOME | End: 2022-12-22
Payer: COMMERCIAL

## 2022-12-22 ENCOUNTER — OFFICE VISIT (OUTPATIENT)
Dept: FAMILY MEDICINE CLINIC | Age: 42
End: 2022-12-22
Payer: COMMERCIAL

## 2022-12-22 VITALS
TEMPERATURE: 97.9 F | OXYGEN SATURATION: 97 % | HEIGHT: 66 IN | WEIGHT: 226.2 LBS | DIASTOLIC BLOOD PRESSURE: 72 MMHG | RESPIRATION RATE: 16 BRPM | SYSTOLIC BLOOD PRESSURE: 112 MMHG | BODY MASS INDEX: 36.35 KG/M2 | HEART RATE: 82 BPM

## 2022-12-22 DIAGNOSIS — F33.41 RECURRENT MAJOR DEPRESSIVE DISORDER, IN PARTIAL REMISSION (HCC): ICD-10-CM

## 2022-12-22 DIAGNOSIS — F41.9 ANXIETY: ICD-10-CM

## 2022-12-22 DIAGNOSIS — R10.13 EPIGASTRIC PAIN: ICD-10-CM

## 2022-12-22 DIAGNOSIS — F51.04 PSYCHOPHYSIOLOGICAL INSOMNIA: ICD-10-CM

## 2022-12-22 DIAGNOSIS — K29.00 ACUTE GASTRITIS WITHOUT HEMORRHAGE, UNSPECIFIED GASTRITIS TYPE: Primary | ICD-10-CM

## 2022-12-22 DIAGNOSIS — K29.00 ACUTE GASTRITIS WITHOUT HEMORRHAGE, UNSPECIFIED GASTRITIS TYPE: ICD-10-CM

## 2022-12-22 DIAGNOSIS — R11.2 NAUSEA AND VOMITING, UNSPECIFIED VOMITING TYPE: ICD-10-CM

## 2022-12-22 DIAGNOSIS — F43.10 PTSD (POST-TRAUMATIC STRESS DISORDER): ICD-10-CM

## 2022-12-22 LAB
ALBUMIN SERPL-MCNC: 4.5 G/DL (ref 3.5–5.1)
ALP BLD-CCNC: 75 U/L (ref 38–126)
ALT SERPL-CCNC: 19 U/L (ref 11–66)
ANION GAP SERPL CALCULATED.3IONS-SCNC: 14 MEQ/L (ref 8–16)
AST SERPL-CCNC: 21 U/L (ref 5–40)
BASOPHILS # BLD: 0.6 %
BASOPHILS ABSOLUTE: 0.1 THOU/MM3 (ref 0–0.1)
BILIRUB SERPL-MCNC: 0.3 MG/DL (ref 0.3–1.2)
BUN BLDV-MCNC: 14 MG/DL (ref 7–22)
CALCIUM SERPL-MCNC: 9.2 MG/DL (ref 8.5–10.5)
CHLORIDE BLD-SCNC: 100 MEQ/L (ref 98–111)
CO2: 24 MEQ/L (ref 23–33)
CREAT SERPL-MCNC: 0.8 MG/DL (ref 0.4–1.2)
EOSINOPHIL # BLD: 3.3 %
EOSINOPHILS ABSOLUTE: 0.3 THOU/MM3 (ref 0–0.4)
ERYTHROCYTE [DISTWIDTH] IN BLOOD BY AUTOMATED COUNT: 12 % (ref 11.5–14.5)
ERYTHROCYTE [DISTWIDTH] IN BLOOD BY AUTOMATED COUNT: 38.6 FL (ref 35–45)
GFR SERPL CREATININE-BSD FRML MDRD: > 60 ML/MIN/1.73M2
GLUCOSE BLD-MCNC: 89 MG/DL (ref 70–108)
HCT VFR BLD CALC: 44.9 % (ref 37–47)
HEMOGLOBIN: 14.6 GM/DL (ref 12–16)
IMMATURE GRANS (ABS): 0.02 THOU/MM3 (ref 0–0.07)
IMMATURE GRANULOCYTES: 0.2 %
LIPASE: 31.5 U/L (ref 5.6–51.3)
LYMPHOCYTES # BLD: 27.7 %
LYMPHOCYTES ABSOLUTE: 2.7 THOU/MM3 (ref 1–4.8)
MCH RBC QN AUTO: 28.9 PG (ref 26–33)
MCHC RBC AUTO-ENTMCNC: 32.5 GM/DL (ref 32.2–35.5)
MCV RBC AUTO: 88.9 FL (ref 81–99)
MONOCYTES # BLD: 5 %
MONOCYTES ABSOLUTE: 0.5 THOU/MM3 (ref 0.4–1.3)
NUCLEATED RED BLOOD CELLS: 0 /100 WBC
PLATELET # BLD: 333 THOU/MM3 (ref 130–400)
PMV BLD AUTO: 10.1 FL (ref 9.4–12.4)
POTASSIUM SERPL-SCNC: 4 MEQ/L (ref 3.5–5.2)
RBC # BLD: 5.05 MILL/MM3 (ref 4.2–5.4)
SEG NEUTROPHILS: 63.2 %
SEGMENTED NEUTROPHILS ABSOLUTE COUNT: 6.1 THOU/MM3 (ref 1.8–7.7)
SODIUM BLD-SCNC: 138 MEQ/L (ref 135–145)
TOTAL PROTEIN: 6.5 G/DL (ref 6.1–8)
WBC # BLD: 9.6 THOU/MM3 (ref 4.8–10.8)

## 2022-12-22 PROCEDURE — 36415 COLL VENOUS BLD VENIPUNCTURE: CPT

## 2022-12-22 PROCEDURE — 3074F SYST BP LT 130 MM HG: CPT | Performed by: FAMILY MEDICINE

## 2022-12-22 PROCEDURE — 80053 COMPREHEN METABOLIC PANEL: CPT

## 2022-12-22 PROCEDURE — 85651 RBC SED RATE NONAUTOMATED: CPT

## 2022-12-22 PROCEDURE — 83690 ASSAY OF LIPASE: CPT

## 2022-12-22 PROCEDURE — 85025 COMPLETE CBC W/AUTO DIFF WBC: CPT

## 2022-12-22 PROCEDURE — 3078F DIAST BP <80 MM HG: CPT | Performed by: FAMILY MEDICINE

## 2022-12-22 PROCEDURE — 99214 OFFICE O/P EST MOD 30 MIN: CPT | Performed by: FAMILY MEDICINE

## 2022-12-22 RX ORDER — PHENYLEPHRINE HCL 1 %
SPRAY, NON-AEROSOL (ML) NASAL
COMMUNITY
Start: 2022-10-21 | End: 2022-12-24

## 2022-12-22 RX ORDER — OMEPRAZOLE 40 MG/1
40 CAPSULE, DELAYED RELEASE ORAL
Qty: 30 CAPSULE | Refills: 3 | Status: SHIPPED | OUTPATIENT
Start: 2022-12-22

## 2022-12-22 RX ORDER — ALPRAZOLAM 0.5 MG/1
TABLET ORAL
COMMUNITY
Start: 2022-09-20

## 2022-12-22 SDOH — ECONOMIC STABILITY: FOOD INSECURITY: WITHIN THE PAST 12 MONTHS, YOU WORRIED THAT YOUR FOOD WOULD RUN OUT BEFORE YOU GOT MONEY TO BUY MORE.: NEVER TRUE

## 2022-12-22 SDOH — ECONOMIC STABILITY: FOOD INSECURITY: WITHIN THE PAST 12 MONTHS, THE FOOD YOU BOUGHT JUST DIDN'T LAST AND YOU DIDN'T HAVE MONEY TO GET MORE.: NEVER TRUE

## 2022-12-22 ASSESSMENT — SOCIAL DETERMINANTS OF HEALTH (SDOH): HOW HARD IS IT FOR YOU TO PAY FOR THE VERY BASICS LIKE FOOD, HOUSING, MEDICAL CARE, AND HEATING?: NOT HARD AT ALL

## 2022-12-22 NOTE — PATIENT INSTRUCTIONS
Website and Technological Resources for Quitting Smoking:    Smokefree. gov    1-800-QUIT-NOW    Smokefree TXT    BeTobaccoFree.gov      Basic anti-reflux lifestyle change suggested:   ?Elevation of the head of the bed three to four inches   ? Cessation of smoking   ? Avoidance of reflux-inducing foods (eg, fatty foods, chocolate, excess alcohol)   ? Avoidance of very acidic beverages (eg, fei, red wine, orange juice)   ? Avoidance of meals for two to three hours before lying down (except for medications)

## 2022-12-22 NOTE — PROGRESS NOTES
Lisa Le (:  1980) is a 43 y.o. female,Established patient, here for evaluation of the following chief complaint(s):  Abdominal Pain (More achy. 1 mo. Hx of bloating, increased gas, vomiting off and on, diarrhea, and increased heartburn. Pt denies any blood in the stool, black stool, or fevers. /)       ASSESSMENT/PLAN:  1. Acute gastritis without hemorrhage, unspecified gastritis type  -     Comprehensive Metabolic Panel; Future  -     CBC with Auto Differential; Future  -     Lipase; Future  -     Sedimentation Rate; Future  -     omeprazole (PRILOSEC) 40 MG delayed release capsule; Take 1 capsule by mouth every morning (before breakfast), Disp-30 capsule, R-3Normal  2. Epigastric pain  -     Comprehensive Metabolic Panel; Future  -     CBC with Auto Differential; Future  -     Lipase; Future  -     Sedimentation Rate; Future  -     omeprazole (PRILOSEC) 40 MG delayed release capsule; Take 1 capsule by mouth every morning (before breakfast), Disp-30 capsule, R-3Normal  3. Nausea and vomiting, unspecified vomiting type  -     Comprehensive Metabolic Panel; Future  -     CBC with Auto Differential; Future  -     Lipase; Future  -     Sedimentation Rate; Future  -     omeprazole (PRILOSEC) 40 MG delayed release capsule; Take 1 capsule by mouth every morning (before breakfast), Disp-30 capsule, R-3Normal    Concern for gastritis vs PUD  Start above  Anti-reflux diet    Light therapy  Artistic distractions  Smoking cessation support  Continue counseling and follow up with psychiatry    Return for 3/8/2022. Subjective   SUBJECTIVE/OBJECTIVE:  HPI    Had heartburn for a while. Off and on, had medications in the past. More often. Sometimes with eating, throat pain. More affected by drinking fluids than foods. Vomiting started 1 mo ago, not daily. Eliminated the coffee, seemed to help. Still off alcohol. Vomiting is non bilious, non bloody. Diarrhea -- last week only, possible bug. Normally has to take stool softener, however this is getting better. 10/21/2021 -- sinusitis but did not take  Atarax increased to 25 mg to 100 mg -- still dealing with flare up. Worse time of therapy  Light therapy available  Iriitability was increasing and tension HA. On abilify 10 mg currently --- increased by Dr. Flory Egan.  -- carnivor keto style eating -- she has less processed foods/snacks available    Wt Readings from Last 3 Encounters:   12/22/22 226 lb 3.2 oz (102.6 kg)   09/07/22 227 lb (103 kg)   06/10/22 233 lb (105.7 kg)     Past Surgical History:   Procedure Laterality Date    TONSILLECTOMY       Review of Systems   No fever/chills. No melena/hematochezia/hemoptysis. Objective   Physical Exam     Gen: NAD, AAO x 3, coherent, pleasant   Conjunctiva clear and nonicterus bilaterally. OP clear with Mucous membranes are moist.   CTAB. RRR  Abd: soft, tender epigastric region, no rebound/guarding. No HSM. Adiposity limits exam.       This office note may have been at least partially dictated. Effort was made to review for errors but some may have been missed. Please contact Terrence Rodrigez of note for clarification if needed. An electronic signature was used to authenticate this note.     --Bianca Rahman MD

## 2022-12-23 LAB — SEDIMENTATION RATE, ERYTHROCYTE: 6 MM/HR (ref 0–20)

## 2022-12-24 RX ORDER — ARIPIPRAZOLE 5 MG/1
10 TABLET ORAL DAILY
Qty: 90 TABLET | Refills: 3
Start: 2022-12-24

## 2023-01-25 ENCOUNTER — TELEPHONE (OUTPATIENT)
Dept: FAMILY MEDICINE CLINIC | Age: 43
End: 2023-01-25

## 2023-01-25 NOTE — TELEPHONE ENCOUNTER
Patient called wanting to know if she is able to double up on her OMEPRAZOLE? She already took one this morning and she feels like she is still going to get sick.

## 2023-02-10 ENCOUNTER — TELEPHONE (OUTPATIENT)
Dept: FAMILY MEDICINE CLINIC | Age: 43
End: 2023-02-10

## 2023-02-10 ENCOUNTER — HOSPITAL ENCOUNTER (EMERGENCY)
Age: 43
Discharge: HOME | End: 2023-02-10
Payer: COMMERCIAL

## 2023-02-10 VITALS
HEIGHT: 66 IN | HEART RATE: 108 BPM | OXYGEN SATURATION: 100 % | TEMPERATURE: 99 F | BODY MASS INDEX: 36.16 KG/M2 | WEIGHT: 225 LBS | DIASTOLIC BLOOD PRESSURE: 78 MMHG | RESPIRATION RATE: 18 BRPM | SYSTOLIC BLOOD PRESSURE: 144 MMHG

## 2023-02-10 DIAGNOSIS — U07.1 COVID-19 VIRUS INFECTION: Primary | ICD-10-CM

## 2023-02-10 LAB
S PYO AG THROAT QL: NEGATIVE
SARS-COV-2 RDRP RESP QL NAA+PROBE: DETECTED

## 2023-02-10 PROCEDURE — 87651 STREP A DNA AMP PROBE: CPT

## 2023-02-10 PROCEDURE — 87635 SARS-COV-2 COVID-19 AMP PRB: CPT

## 2023-02-10 PROCEDURE — 99213 OFFICE O/P EST LOW 20 MIN: CPT | Performed by: NURSE PRACTITIONER

## 2023-02-10 PROCEDURE — 99213 OFFICE O/P EST LOW 20 MIN: CPT

## 2023-02-10 RX ORDER — IBUPROFEN 200 MG
200 TABLET ORAL EVERY 6 HOURS PRN
COMMUNITY

## 2023-02-10 ASSESSMENT — ENCOUNTER SYMPTOMS
NAUSEA: 0
VOMITING: 0
SORE THROAT: 1
DIARRHEA: 0
SHORTNESS OF BREATH: 0
COUGH: 1

## 2023-02-10 ASSESSMENT — PAIN - FUNCTIONAL ASSESSMENT: PAIN_FUNCTIONAL_ASSESSMENT: 0-10

## 2023-02-10 ASSESSMENT — PAIN DESCRIPTION - DESCRIPTORS: DESCRIPTORS: ACHING

## 2023-02-10 ASSESSMENT — PAIN DESCRIPTION - FREQUENCY: FREQUENCY: CONTINUOUS

## 2023-02-10 ASSESSMENT — PAIN DESCRIPTION - PAIN TYPE: TYPE: ACUTE PAIN

## 2023-02-10 ASSESSMENT — PAIN SCALES - GENERAL: PAINLEVEL_OUTOF10: 4

## 2023-02-10 ASSESSMENT — PAIN DESCRIPTION - ONSET: ONSET: GRADUAL

## 2023-02-10 ASSESSMENT — PAIN DESCRIPTION - LOCATION: LOCATION: BACK;HEAD;GENERALIZED

## 2023-02-10 NOTE — ED PROVIDER NOTES
[address]  Urgent Care Encounter       CHIEF COMPLAINT       Chief Complaint   Patient presents with    Cough    Headache    Generalized Body Aches    Nasal Congestion     Runny nose         Nurses Notes reviewed and I agree except as noted in the HPI. HISTORY OF PRESENT ILLNESS   Christiana Briscoe is a 43 y.o. female who presents for evaluation of runny nose, mild cough, sore throat and body aches with chills. Patient states that the symptoms began last night and have continued into today. States that she did take some ibuprofen roughly 4 hours before arrival at the urgent care. Denies any other medications or interventions. She denies any confirmed sick exposures but states that she is around a lot of kids on a regular basis. Denies any chest tightness or shortness of breath. The history is provided by the patient. REVIEW OF SYSTEMS     Review of Systems   Constitutional:  Positive for chills and fever (subjective). HENT:  Positive for congestion and sore throat. Respiratory:  Positive for cough. Negative for shortness of breath. Cardiovascular:  Negative for chest pain. Gastrointestinal:  Negative for diarrhea, nausea and vomiting. Musculoskeletal:  Negative for arthralgias and myalgias. Skin:  Negative for rash. Allergic/Immunologic: Negative for immunocompromised state. Neurological:  Negative for headaches. PAST MEDICAL HISTORY         Diagnosis Date    Anxiety     Depression     Hypertension     Hypothyroidism     Multiple allergies        SURGICALHISTORY     Patient  has a past surgical history that includes Tonsillectomy. CURRENT MEDICATIONS       Previous Medications    ALPRAZOLAM (XANAX) 0.5 MG TABLET    TAKE 1 TABLET BY MOUTH TWICE A DAY AS NEEDED FOR ANXIETY    ARIPIPRAZOLE (ABILIFY) 5 MG TABLET    Take 2 tablets by mouth daily Per Dr. Chicho Ledezma.     ASPIRIN 81 MG EC TABLET    Take 81 mg by mouth daily    CETIRIZINE HCL (ZYRTEC PO)    Take 1 tablet by mouth daily    DESVENLAFAXINE SUCCINATE (PRISTIQ) 100 MG TB24 EXTENDED RELEASE TABLET    Take 1 tablet by mouth daily    FLUTICASONE (FLONASE) 50 MCG/ACT NASAL SPRAY    1 spray by Each Nostril route daily    HYDROXYZINE HCL (ATARAX) 25 MG TABLET    Take 1 tablet by mouth at bedtime    IBUPROFEN (ADVIL;MOTRIN) 200 MG TABLET    Take 200 mg by mouth every 6 hours as needed for Pain    LOSARTAN-HYDROCHLOROTHIAZIDE (HYZAAR) 50-12.5 MG PER TABLET    Take 0.5 tablets by mouth daily    OMEPRAZOLE (PRILOSEC) 40 MG DELAYED RELEASE CAPSULE    Take 1 capsule by mouth every morning (before breakfast)    PRAZOSIN (MINIPRESS) 1 MG CAPSULE    TAKE 1 CAPSULE BY MOUTH AT BEDTIME    TRINTELLIX 20 MG TABS TABLET           ALLERGIES     Patient is is allergic to wellbutrin [bupropion] and trazodone and nefazodone. Patients   Immunization History   Administered Date(s) Administered    Tdap (Boostrix, Adacel) 05/11/2019       FAMILY HISTORY     Patient's family history includes Alcohol Abuse in her brother, brother, father, and sister; Breast Cancer in her mother and sister; Depression in her mother; Mental Illness in her father; Substance Abuse in her sister. SOCIAL HISTORY     Patient  reports that she has been smoking cigarettes. She has a 2.50 pack-year smoking history. She has never used smokeless tobacco. She reports that she does not currently use alcohol. She reports current drug use. Drug: Marijuana Delpha Reaper). PHYSICAL EXAM     ED TRIAGE VITALS  BP: (!) 144/78, Temp: 99 °F (37.2 °C), Heart Rate: (!) 108, Resp: 18, SpO2: 100 %,Estimated body mass index is 36.32 kg/m² as calculated from the following:    Height as of this encounter: 5' 6\" (1.676 m). Weight as of this encounter: 225 lb (102.1 kg). ,No LMP recorded. Physical Exam  Vitals and nursing note reviewed. Constitutional:       General: She is not in acute distress. Appearance: She is well-developed. She is not diaphoretic.    HENT:      Right Ear: Tympanic membrane and ear canal normal.      Left Ear: Tympanic membrane and ear canal normal.      Mouth/Throat:      Mouth: Mucous membranes are moist.      Pharynx: Oropharynx is clear. Posterior oropharyngeal erythema present. Comments: Bilateral tonsils have been surgically removed  Eyes:      Conjunctiva/sclera:      Right eye: Right conjunctiva is not injected. Left eye: Left conjunctiva is not injected. Pupils: Pupils are equal.   Cardiovascular:      Rate and Rhythm: Regular rhythm. Tachycardia present. Heart sounds: No murmur heard. Pulmonary:      Effort: Pulmonary effort is normal. No respiratory distress. Breath sounds: Normal breath sounds. Musculoskeletal:      Cervical back: Normal range of motion. Lymphadenopathy:      Head:      Right side of head: No tonsillar adenopathy. Left side of head: No tonsillar adenopathy. Cervical: No cervical adenopathy. Skin:     General: Skin is warm. Findings: No rash. Neurological:      Mental Status: She is alert and oriented to person, place, and time. Psychiatric:         Behavior: Behavior normal.       DIAGNOSTIC RESULTS     Labs:  Results for orders placed or performed during the hospital encounter of 02/10/23   COVID-19, Rapid   Result Value Ref Range    SARS-CoV-2, JESSICA DETECTED (AA) NOT DETECTED   Strep Screen Group A Throat   Result Value Ref Range    Rapid Strep A Screen NEGATIVE        IMAGING:    No orders to display         EKG:      URGENT CARE COURSE:     Vitals:    02/10/23 0938   BP: (!) 144/78   Pulse: (!) 108   Resp: 18   Temp: 99 °F (37.2 °C)   TempSrc: Temporal   SpO2: 100%   Weight: 225 lb (102.1 kg)   Height: 5' 6\" (1.676 m)       Medications - No data to display         PROCEDURES:  None    FINAL IMPRESSION      1. COVID-19 virus infection          DISPOSITION/ PLAN       Patient is positive for COVID-19 today in the urgent care.   I discussed the plan to treat symptomatically with continued Tylenol and ibuprofen and I did discuss appropriate ibuprofen dosing for the patient. She is advised to rest and hydrate and I did discuss the CDC guidelines for quarantine as well. She is instructed to follow-up on an outpatient basis within the next week or present to the ER if her symptoms would worsen. She is agreeable to the plan as discussed.     PATIENT REFERRED TO:  MD Shakila Velazquez [address] / [facility] [address]      DISCHARGE MEDICATIONS:  New Prescriptions    No medications on file       Discontinued Medications    No medications on file       Current Discharge Medication List          MAURICE Pozo CNP    (Please note that portions of this note were completed with a voice recognition program. Efforts were made to edit the dictations but occasionally words are mis-transcribed.)           MAURICE Pozo CNP  02/10/23 9156

## 2023-02-10 NOTE — TELEPHONE ENCOUNTER
Patient called and reports doing an e-visit on my chart. Patient reports at the end, it said she needed to call the office. Patient reports having flu s/s,with chills, runny nose, aches, and pain. Patient reports the body aches are so bad. Unfortunately there are no available appointments today. Advised patient that she will need to get swabbed for flu/covid, to go to [facility] for further assessment. Patient verbalized understanding.

## 2023-02-10 NOTE — DISCHARGE INSTRUCTIONS
Ibuprofen dosing:    3 tablets (600mg) every 6 hours as needed. OR    4 tablets (800mg) every 8 hours as needed. Quarantine:    Isolate and quarantine at home until 2/16/2023. Wear a mask or face covering while in public until 1/03/7535.

## 2023-02-10 NOTE — ED TRIAGE NOTES
Arrives to [facility] for the evaluation of cough, headache, body aches and runny nose. Symptoms started last night around 2100. States she is more tired today but did not sleep well last night. Respirations unlabored. Not actively coughing at this time. Temp 99.0 (T). Denies fever at home. Did take Ibuprofen 200 mg this morning around 0530. Current pain level is 4/10 in severity. Feels aching in head, back and \"all over\". Swabbed for Strep and COVID. Patient assessed by Marla Jeronimo CNP.

## 2023-02-27 DIAGNOSIS — R10.13 EPIGASTRIC PAIN: ICD-10-CM

## 2023-02-27 DIAGNOSIS — R11.2 NAUSEA AND VOMITING, UNSPECIFIED VOMITING TYPE: ICD-10-CM

## 2023-02-27 DIAGNOSIS — K29.00 ACUTE GASTRITIS WITHOUT HEMORRHAGE, UNSPECIFIED GASTRITIS TYPE: ICD-10-CM

## 2023-02-27 NOTE — TELEPHONE ENCOUNTER
Lewis Epstein called requesting a refill on the following medications:  Requested Prescriptions     Pending Prescriptions Disp Refills    omeprazole (PRILOSEC) 40 MG delayed release capsule [Pharmacy Med Name: Omeprazole 40 MG Oral Capsule Delayed Release] 90 capsule 3     Sig: TAKE 1 CAPSULE BY MOUTH IN THE  MORNING BEFORE BREAKFAST       Date of last visit: 12/22/2022  Date of next visit (if applicable):3/8/2023  Date of last refill: 12/22/22, #3/30  Pharmacy Name: [facility]      [place], [place]

## 2023-02-28 RX ORDER — OMEPRAZOLE 40 MG/1
CAPSULE, DELAYED RELEASE ORAL
Qty: 90 CAPSULE | Refills: 3 | Status: SHIPPED | OUTPATIENT
Start: 2023-02-28

## 2023-03-08 ENCOUNTER — OFFICE VISIT (OUTPATIENT)
Dept: FAMILY MEDICINE CLINIC | Age: 43
End: 2023-03-08

## 2023-03-08 VITALS
DIASTOLIC BLOOD PRESSURE: 64 MMHG | OXYGEN SATURATION: 100 % | HEIGHT: 66 IN | TEMPERATURE: 97.4 F | SYSTOLIC BLOOD PRESSURE: 116 MMHG | HEART RATE: 93 BPM | BODY MASS INDEX: 34.87 KG/M2 | WEIGHT: 217 LBS

## 2023-03-08 DIAGNOSIS — F41.9 ANXIETY: ICD-10-CM

## 2023-03-08 DIAGNOSIS — F33.41 RECURRENT MAJOR DEPRESSIVE DISORDER, IN PARTIAL REMISSION (HCC): ICD-10-CM

## 2023-03-08 DIAGNOSIS — F51.04 PSYCHOPHYSIOLOGICAL INSOMNIA: ICD-10-CM

## 2023-03-08 DIAGNOSIS — K21.9 GASTROESOPHAGEAL REFLUX DISEASE WITHOUT ESOPHAGITIS: Primary | ICD-10-CM

## 2023-03-08 DIAGNOSIS — E53.8 B12 DEFICIENCY: ICD-10-CM

## 2023-03-08 DIAGNOSIS — F17.200 SMOKER: ICD-10-CM

## 2023-03-08 DIAGNOSIS — E66.01 SEVERE OBESITY (BMI 35.0-39.9) WITH COMORBIDITY (HCC): ICD-10-CM

## 2023-03-08 DIAGNOSIS — F10.11 HISTORY OF ALCOHOL ABUSE: ICD-10-CM

## 2023-03-08 DIAGNOSIS — I10 ESSENTIAL HYPERTENSION: Chronic | ICD-10-CM

## 2023-03-08 DIAGNOSIS — Z00.00 HEALTHCARE MAINTENANCE: ICD-10-CM

## 2023-03-08 DIAGNOSIS — F43.10 PTSD (POST-TRAUMATIC STRESS DISORDER): ICD-10-CM

## 2023-03-08 DIAGNOSIS — Z13.220 SCREENING, LIPID: ICD-10-CM

## 2023-03-08 PROBLEM — F10.20 ALCOHOLISM (HCC): Status: RESOLVED | Noted: 2020-10-16 | Resolved: 2023-03-08

## 2023-03-08 RX ORDER — HYDROXYZINE 50 MG/1
50 TABLET, FILM COATED ORAL 3 TIMES DAILY PRN
COMMUNITY

## 2023-03-08 RX ORDER — FAMOTIDINE 20 MG/1
20 TABLET, FILM COATED ORAL NIGHTLY
Qty: 60 TABLET | Refills: 0 | Status: SHIPPED | OUTPATIENT
Start: 2023-03-08 | End: 2023-05-07

## 2023-03-08 SDOH — ECONOMIC STABILITY: INCOME INSECURITY: HOW HARD IS IT FOR YOU TO PAY FOR THE VERY BASICS LIKE FOOD, HOUSING, MEDICAL CARE, AND HEATING?: NOT VERY HARD

## 2023-03-08 SDOH — ECONOMIC STABILITY: FOOD INSECURITY: WITHIN THE PAST 12 MONTHS, YOU WORRIED THAT YOUR FOOD WOULD RUN OUT BEFORE YOU GOT MONEY TO BUY MORE.: NEVER TRUE

## 2023-03-08 SDOH — ECONOMIC STABILITY: FOOD INSECURITY: WITHIN THE PAST 12 MONTHS, THE FOOD YOU BOUGHT JUST DIDN'T LAST AND YOU DIDN'T HAVE MONEY TO GET MORE.: NEVER TRUE

## 2023-03-08 SDOH — ECONOMIC STABILITY: HOUSING INSECURITY
IN THE LAST 12 MONTHS, WAS THERE A TIME WHEN YOU DID NOT HAVE A STEADY PLACE TO SLEEP OR SLEPT IN A SHELTER (INCLUDING NOW)?: NO

## 2023-03-08 ASSESSMENT — PATIENT HEALTH QUESTIONNAIRE - PHQ9
4. FEELING TIRED OR HAVING LITTLE ENERGY: 0
9. THOUGHTS THAT YOU WOULD BE BETTER OFF DEAD, OR OF HURTING YOURSELF: 0
7. TROUBLE CONCENTRATING ON THINGS, SUCH AS READING THE NEWSPAPER OR WATCHING TELEVISION: 0
SUM OF ALL RESPONSES TO PHQ QUESTIONS 1-9: 0
SUM OF ALL RESPONSES TO PHQ QUESTIONS 1-9: 0
SUM OF ALL RESPONSES TO PHQ9 QUESTIONS 1 & 2: 0
2. FEELING DOWN, DEPRESSED OR HOPELESS: 0
SUM OF ALL RESPONSES TO PHQ QUESTIONS 1-9: 0
8. MOVING OR SPEAKING SO SLOWLY THAT OTHER PEOPLE COULD HAVE NOTICED. OR THE OPPOSITE, BEING SO FIGETY OR RESTLESS THAT YOU HAVE BEEN MOVING AROUND A LOT MORE THAN USUAL: 0
6. FEELING BAD ABOUT YOURSELF - OR THAT YOU ARE A FAILURE OR HAVE LET YOURSELF OR YOUR FAMILY DOWN: 0
SUM OF ALL RESPONSES TO PHQ QUESTIONS 1-9: 0
5. POOR APPETITE OR OVEREATING: 0
1. LITTLE INTEREST OR PLEASURE IN DOING THINGS: 0
3. TROUBLE FALLING OR STAYING ASLEEP: 0
10. IF YOU CHECKED OFF ANY PROBLEMS, HOW DIFFICULT HAVE THESE PROBLEMS MADE IT FOR YOU TO DO YOUR WORK, TAKE CARE OF THINGS AT HOME, OR GET ALONG WITH OTHER PEOPLE: 0

## 2023-03-08 NOTE — PROGRESS NOTES
Jesus Espinal (:  1980) is a 37 y.o. female,Established patient, here for evaluation of the following chief complaint(s):  6 Month Follow-Up and Hypertension         ASSESSMENT/PLAN:  1. Gastroesophageal reflux disease without esophagitis  -     famotidine (PEPCID) 20 MG tablet; Take 1 tablet by mouth at bedtime, Disp-60 tablet, R-0Normal  2. Recurrent major depressive disorder, in partial remission ([facility]  3. Severe obesity (BMI 35.0-39. 9) with comorbidity ([facility]  4. History of alcohol abuse  5. PTSD (post-traumatic stress disorder)  6. Smoker  7. Anxiety  8. Essential hypertension  9. B12 deficiency  -     Vitamin B12; Future  10. Psychophysiological insomnia  11. Healthcare maintenance  -     Vitamin B12; Future  -     Lipid Panel; Future  -     Basic Metabolic Panel; Future  12. Screening, lipid  -     Lipid Panel; Future    Will continue on current medication with PPI in am and add Pepcid pm for next month or so. The goal is to allow time for her to go through some of the stress and related grieving/processing and continue to eat healthfully for the gut, then wean the H2 blocker. After that, if doing well for a week or 2, she can try weaning the PPI. We discussed rebound acidity with PPI and she may need to do some famotidine to wean off Prilosec. We discussed that the neck step if not able to wean off the medications will be to have a scope done with GI referral.  In the meantime I encouraged counseling and continue to follow with a psychiatrist.  Smoking cessation advised. Return in about 3 months (around 2023). Subjective   SUBJECTIVE/OBJECTIVE:  HPI    Patient is following up here for belly issues and mental health. She follows with psychiatrist and went back to Counseling. This has helped and medications used to be balance. Interestingly, she is taking Trintellix plus Pristiq. Abilify and prazosin have helped significantly with PTSD.   She continues to use some Xanax intermittently. GERD  -- medication working. However she has not been able to wean off as she desired. There is a lot of stress going on however. Stopped coffee. Not doing alcohol. Smoking still -- wants to quit, closer to 1 ppd, instead of 1/2 ppd. She found out cheating on her emotionally. Increased stress. Now marriage counseling. Making things work. Covid19 decreased appetite. Then stomach bug for 5 days. Vomiting off and on. Usually in am.   No melena/hematochezia/hemoptysis. Wt Readings from Last 3 Encounters:   03/08/23 217 lb (98.4 kg)   02/10/23 225 lb (102.1 kg)   12/22/22 226 lb 3.2 oz (102.6 kg)     Review of Systems  As above. No fevers or chills. Objective   Physical Exam     In general appears to be in no acute distress, alert and oriented. Lungs are clear to auscultation with good air exchange and the heart is regular rate and rhythm. Abdomen is soft and nontender. This office note may have been at least partially dictated. Effort was made to review for errors but some may have been missed. Please contact Shannan Cano of note for clarification if needed. An electronic signature was used to authenticate this note.     --Loren Wilcox MD

## 2023-05-06 DIAGNOSIS — K21.9 GASTROESOPHAGEAL REFLUX DISEASE WITHOUT ESOPHAGITIS: ICD-10-CM

## 2023-05-08 RX ORDER — FAMOTIDINE 20 MG/1
TABLET, FILM COATED ORAL
Qty: 30 TABLET | Refills: 1 | Status: SHIPPED | OUTPATIENT
Start: 2023-05-08 | End: 2023-05-12

## 2023-05-08 NOTE — TELEPHONE ENCOUNTER
Bety Ornelas called requesting a refill on the following medications:  Requested Prescriptions     Pending Prescriptions Disp Refills    famotidine (PEPCID) 20 MG tablet [Pharmacy Med Name: FAMOTIDINE 20 MG TABLET] 30 tablet 1     Sig: TAKE 1 TABLET BY MOUTH EVERYDAY AT BEDTIME       Date of last visit: 3/8/2023  Date of next visit (if applicable):6/16/2023  Date of last refill: 3/8/23  Pharmacy Name: [facility]    Rx pending #60/0      Thanks,  Liliya Shahid LPN

## 2023-05-12 ENCOUNTER — OFFICE VISIT (OUTPATIENT)
Dept: FAMILY MEDICINE CLINIC | Age: 43
End: 2023-05-12
Payer: COMMERCIAL

## 2023-05-12 ENCOUNTER — HOSPITAL ENCOUNTER (OUTPATIENT)
Age: 43
Discharge: HOME | End: 2023-05-12
Payer: COMMERCIAL

## 2023-05-12 VITALS
TEMPERATURE: 97.8 F | HEIGHT: 66 IN | DIASTOLIC BLOOD PRESSURE: 72 MMHG | WEIGHT: 211 LBS | HEART RATE: 88 BPM | SYSTOLIC BLOOD PRESSURE: 132 MMHG | OXYGEN SATURATION: 99 % | BODY MASS INDEX: 33.91 KG/M2

## 2023-05-12 DIAGNOSIS — R11.2 NAUSEA AND VOMITING, UNSPECIFIED VOMITING TYPE: ICD-10-CM

## 2023-05-12 DIAGNOSIS — E53.8 B12 DEFICIENCY: ICD-10-CM

## 2023-05-12 DIAGNOSIS — K29.00 ACUTE GASTRITIS WITHOUT HEMORRHAGE, UNSPECIFIED GASTRITIS TYPE: Primary | ICD-10-CM

## 2023-05-12 DIAGNOSIS — R10.13 EPIGASTRIC PAIN: ICD-10-CM

## 2023-05-12 DIAGNOSIS — Z00.00 HEALTHCARE MAINTENANCE: ICD-10-CM

## 2023-05-12 DIAGNOSIS — Z13.220 SCREENING, LIPID: ICD-10-CM

## 2023-05-12 DIAGNOSIS — Z82.49 FAMILY HISTORY OF PREMATURE CAD: ICD-10-CM

## 2023-05-12 LAB
ANION GAP SERPL CALC-SCNC: 11 MEQ/L (ref 8–16)
BUN SERPL-MCNC: 16 MG/DL (ref 7–22)
CALCIUM SERPL-MCNC: 9.5 MG/DL (ref 8.5–10.5)
CHLORIDE SERPL-SCNC: 101 MEQ/L (ref 98–111)
CHOLEST SERPL-MCNC: 219 MG/DL (ref 100–199)
CO2 SERPL-SCNC: 26 MEQ/L (ref 23–33)
CREAT SERPL-MCNC: 0.9 MG/DL (ref 0.4–1.2)
GFR SERPL CREATININE-BSD FRML MDRD: > 60 ML/MIN/1.73M2
GLUCOSE SERPL-MCNC: 101 MG/DL (ref 70–108)
HDLC SERPL-MCNC: 43 MG/DL
LDLC SERPL CALC-MCNC: 149 MG/DL
POTASSIUM SERPL-SCNC: 4.2 MEQ/L (ref 3.5–5.2)
SODIUM SERPL-SCNC: 138 MEQ/L (ref 135–145)
TRIGL SERPL-MCNC: 133 MG/DL (ref 0–199)
VIT B12 SERPL-MCNC: 811 PG/ML (ref 211–911)

## 2023-05-12 PROCEDURE — 3078F DIAST BP <80 MM HG: CPT | Performed by: FAMILY MEDICINE

## 2023-05-12 PROCEDURE — 82607 VITAMIN B-12: CPT

## 2023-05-12 PROCEDURE — 3075F SYST BP GE 130 - 139MM HG: CPT | Performed by: FAMILY MEDICINE

## 2023-05-12 PROCEDURE — 99214 OFFICE O/P EST MOD 30 MIN: CPT | Performed by: FAMILY MEDICINE

## 2023-05-12 PROCEDURE — 80048 BASIC METABOLIC PNL TOTAL CA: CPT

## 2023-05-12 PROCEDURE — 36415 COLL VENOUS BLD VENIPUNCTURE: CPT

## 2023-05-12 PROCEDURE — 80061 LIPID PANEL: CPT

## 2023-05-12 RX ORDER — ARIPIPRAZOLE 10 MG/1
20 TABLET ORAL DAILY
COMMUNITY

## 2023-05-12 RX ORDER — ONDANSETRON 4 MG/1
4 TABLET, ORALLY DISINTEGRATING ORAL 3 TIMES DAILY PRN
Qty: 21 TABLET | Refills: 0 | Status: SHIPPED | OUTPATIENT
Start: 2023-05-12

## 2023-05-12 RX ORDER — PANTOPRAZOLE SODIUM 40 MG/1
40 TABLET, DELAYED RELEASE ORAL
Qty: 60 TABLET | Refills: 2 | Status: SHIPPED | OUTPATIENT
Start: 2023-05-12

## 2023-05-18 ENCOUNTER — HOSPITAL ENCOUNTER (OUTPATIENT)
Dept: ULTRASOUND IMAGING | Age: 43
Discharge: HOME | End: 2023-05-18
Payer: COMMERCIAL

## 2023-05-18 DIAGNOSIS — K29.00 ACUTE GASTRITIS WITHOUT HEMORRHAGE, UNSPECIFIED GASTRITIS TYPE: ICD-10-CM

## 2023-05-18 DIAGNOSIS — R11.2 NAUSEA AND VOMITING, UNSPECIFIED VOMITING TYPE: ICD-10-CM

## 2023-05-18 DIAGNOSIS — R10.13 EPIGASTRIC PAIN: ICD-10-CM

## 2023-05-18 PROCEDURE — 76705 ECHO EXAM OF ABDOMEN: CPT

## 2023-05-26 DIAGNOSIS — R10.13 EPIGASTRIC PAIN: ICD-10-CM

## 2023-05-26 DIAGNOSIS — R11.2 NAUSEA AND VOMITING, UNSPECIFIED VOMITING TYPE: ICD-10-CM

## 2023-05-26 DIAGNOSIS — K29.00 ACUTE GASTRITIS WITHOUT HEMORRHAGE, UNSPECIFIED GASTRITIS TYPE: ICD-10-CM

## 2023-05-26 RX ORDER — ONDANSETRON 4 MG/1
4 TABLET, ORALLY DISINTEGRATING ORAL 3 TIMES DAILY PRN
Qty: 21 TABLET | Refills: 0 | Status: SHIPPED | OUTPATIENT
Start: 2023-05-26

## 2023-05-26 NOTE — TELEPHONE ENCOUNTER
Christiana Briscoe called requesting a refill on the following medications:  Requested Prescriptions     Pending Prescriptions Disp Refills    ondansetron (ZOFRAN-ODT) 4 MG disintegrating tablet 21 tablet 0     Sig: Take 1 tablet by mouth 3 times daily as needed for Nausea or Vomiting       Date of last visit: 5/12/2023  Date of next visit (if applicable):6/16/2023  Date of last refill: 5/12/2023  [facility] [address] [place], [place] [address]Vikash MA

## 2023-06-05 ENCOUNTER — TELEPHONE (OUTPATIENT)
Dept: FAMILY MEDICINE CLINIC | Age: 43
End: 2023-06-05

## 2023-06-05 DIAGNOSIS — R10.13 EPIGASTRIC PAIN: ICD-10-CM

## 2023-06-05 DIAGNOSIS — R11.2 NAUSEA AND VOMITING, UNSPECIFIED VOMITING TYPE: ICD-10-CM

## 2023-06-05 DIAGNOSIS — K29.00 ACUTE GASTRITIS WITHOUT HEMORRHAGE, UNSPECIFIED GASTRITIS TYPE: ICD-10-CM

## 2023-06-05 RX ORDER — ONDANSETRON 4 MG/1
4 TABLET, ORALLY DISINTEGRATING ORAL 3 TIMES DAILY PRN
Qty: 30 TABLET | Refills: 0 | Status: SHIPPED | OUTPATIENT
Start: 2023-06-05

## 2023-06-05 NOTE — TELEPHONE ENCOUNTER
Festus Thorne called requesting a refill on the following medications:  Requested Prescriptions     Pending Prescriptions Disp Refills    ondansetron (ZOFRAN-ODT) 4 MG disintegrating tablet 30 tablet 0     Sig: Take 1 tablet by mouth 3 times daily as needed for Nausea or Vomiting       Date of last visit: 5/12/2023  Date of next visit (if applicable):6/16/2023  Date of last refill:   Pharmacy Name: [facility], [address] [place] [address]

## 2023-06-05 NOTE — TELEPHONE ENCOUNTER
Mily Brynnav needs a refill on Zofran. Does not get scope done until 6/22/23. Call into [facility] in [facility].

## 2023-06-06 LAB — MAMMOGRAPHY, EXTERNAL: NORMAL

## 2023-07-02 DIAGNOSIS — R10.13 EPIGASTRIC PAIN: ICD-10-CM

## 2023-07-02 DIAGNOSIS — K29.00 ACUTE GASTRITIS WITHOUT HEMORRHAGE, UNSPECIFIED GASTRITIS TYPE: ICD-10-CM

## 2023-07-06 RX ORDER — FAMOTIDINE 20 MG/1
TABLET, FILM COATED ORAL
Qty: 30 TABLET | Refills: 1 | OUTPATIENT
Start: 2023-07-06

## 2023-07-28 DIAGNOSIS — R11.2 NAUSEA AND VOMITING, UNSPECIFIED VOMITING TYPE: ICD-10-CM

## 2023-07-28 DIAGNOSIS — R10.13 EPIGASTRIC PAIN: ICD-10-CM

## 2023-07-28 DIAGNOSIS — K29.00 ACUTE GASTRITIS WITHOUT HEMORRHAGE, UNSPECIFIED GASTRITIS TYPE: ICD-10-CM

## 2023-07-28 RX ORDER — ONDANSETRON 4 MG/1
4 TABLET, ORALLY DISINTEGRATING ORAL 3 TIMES DAILY PRN
Qty: 30 TABLET | Refills: 1 | Status: SHIPPED | OUTPATIENT
Start: 2023-07-28

## 2023-07-28 NOTE — TELEPHONE ENCOUNTER
Jori Quiros called requesting a refill on the following medications:  Requested Prescriptions     Pending Prescriptions Disp Refills    ondansetron (ZOFRAN-ODT) 4 MG disintegrating tablet 30 tablet 0     Sig: Take 1 tablet by mouth 3 times daily as needed for Nausea or Vomiting       Date of last visit: 6/16/2023  Date of next visit (if applicable):9/5/2023  Date of last refill: 6/16/23  Pharmacy Name: [facility]    Rx pending #30/0      Thanks,  Monty Rowan LPN

## 2023-09-05 ENCOUNTER — OFFICE VISIT (OUTPATIENT)
Dept: FAMILY MEDICINE CLINIC | Age: 43
End: 2023-09-05
Payer: COMMERCIAL

## 2023-09-05 VITALS
BODY MASS INDEX: 33.27 KG/M2 | SYSTOLIC BLOOD PRESSURE: 118 MMHG | TEMPERATURE: 97.3 F | WEIGHT: 207 LBS | DIASTOLIC BLOOD PRESSURE: 76 MMHG | HEIGHT: 66 IN | HEART RATE: 100 BPM | OXYGEN SATURATION: 98 %

## 2023-09-05 DIAGNOSIS — K21.9 GASTROESOPHAGEAL REFLUX DISEASE WITHOUT ESOPHAGITIS: ICD-10-CM

## 2023-09-05 DIAGNOSIS — I10 ESSENTIAL HYPERTENSION: Chronic | ICD-10-CM

## 2023-09-05 DIAGNOSIS — F41.9 ANXIETY: Primary | ICD-10-CM

## 2023-09-05 DIAGNOSIS — R11.2 NAUSEA AND VOMITING, UNSPECIFIED VOMITING TYPE: ICD-10-CM

## 2023-09-05 DIAGNOSIS — R10.13 EPIGASTRIC PAIN: ICD-10-CM

## 2023-09-05 DIAGNOSIS — K29.00 ACUTE GASTRITIS WITHOUT HEMORRHAGE, UNSPECIFIED GASTRITIS TYPE: ICD-10-CM

## 2023-09-05 PROCEDURE — 3078F DIAST BP <80 MM HG: CPT | Performed by: FAMILY MEDICINE

## 2023-09-05 PROCEDURE — 3074F SYST BP LT 130 MM HG: CPT | Performed by: FAMILY MEDICINE

## 2023-09-05 PROCEDURE — 99214 OFFICE O/P EST MOD 30 MIN: CPT | Performed by: FAMILY MEDICINE

## 2023-09-05 RX ORDER — ONDANSETRON 4 MG/1
4 TABLET, ORALLY DISINTEGRATING ORAL 3 TIMES DAILY PRN
Qty: 30 TABLET | Refills: 0 | Status: SHIPPED | OUTPATIENT
Start: 2023-09-05 | End: 2023-09-08 | Stop reason: SDUPTHER

## 2023-09-05 RX ORDER — ONDANSETRON 4 MG/1
4 TABLET, FILM COATED ORAL DAILY PRN
Qty: 90 TABLET | Refills: 1 | Status: SHIPPED | OUTPATIENT
Start: 2023-09-05 | End: 2023-09-08

## 2023-09-05 ASSESSMENT — ENCOUNTER SYMPTOMS: SHORTNESS OF BREATH: 0

## 2023-09-05 NOTE — PROGRESS NOTES
Loyda Yeung (:  1980) is a 37 y.o. female,Established patient, here for evaluation of the following chief complaint(s):  Hypertension and Gastroesophageal Reflux         ASSESSMENT/PLAN:  1. Anxiety  2. Acute gastritis without hemorrhage, unspecified gastritis type  -     ondansetron (ZOFRAN-ODT) 4 MG disintegrating tablet; Take 1 tablet by mouth 3 times daily as needed for Nausea or Vomiting, Disp-30 tablet, R-0Normal  -     ondansetron (ZOFRAN) 4 MG tablet; Take 1 tablet by mouth daily as needed for Nausea or Vomiting, Disp-90 tablet, R-1Normal  3. Epigastric pain  -     ondansetron (ZOFRAN-ODT) 4 MG disintegrating tablet; Take 1 tablet by mouth 3 times daily as needed for Nausea or Vomiting, Disp-30 tablet, R-0Normal  -     ondansetron (ZOFRAN) 4 MG tablet; Take 1 tablet by mouth daily as needed for Nausea or Vomiting, Disp-90 tablet, R-1Normal  4. Nausea and vomiting, unspecified vomiting type  -     ondansetron (ZOFRAN-ODT) 4 MG disintegrating tablet; Take 1 tablet by mouth 3 times daily as needed for Nausea or Vomiting, Disp-30 tablet, R-0Normal  -     ondansetron (ZOFRAN) 4 MG tablet; Take 1 tablet by mouth daily as needed for Nausea or Vomiting, Disp-90 tablet, R-1Normal  5. Essential hypertension  6. Gastroesophageal reflux disease without esophagitis    Patient will continue on PPI dosing as current. Zofran will continue. She will continue work with GI for symptom management. Management of anxiety through various means including nonpharmacological and healthy distractions discussed. Patient to continue with counseling and also has psychiatry available to review management of medications for her anxiety. Consideration may be given to gluten-free or dairy free diet trials to see if further improvement of symptoms occur  Smoking cessation would likely help with esophageal and gastric irritation. Consider effect of THC on the stomach issues.   Currently patient feels unable to cope with the

## 2023-09-08 DIAGNOSIS — R10.13 EPIGASTRIC PAIN: ICD-10-CM

## 2023-09-08 DIAGNOSIS — R11.2 NAUSEA AND VOMITING, UNSPECIFIED VOMITING TYPE: ICD-10-CM

## 2023-09-08 DIAGNOSIS — K29.00 ACUTE GASTRITIS WITHOUT HEMORRHAGE, UNSPECIFIED GASTRITIS TYPE: ICD-10-CM

## 2023-09-08 RX ORDER — ONDANSETRON 4 MG/1
4 TABLET, ORALLY DISINTEGRATING ORAL 3 TIMES DAILY PRN
Qty: 90 TABLET | Refills: 1 | Status: SHIPPED | OUTPATIENT
Start: 2023-09-08

## 2023-11-27 ENCOUNTER — OFFICE VISIT (OUTPATIENT)
Dept: FAMILY MEDICINE CLINIC | Age: 43
End: 2023-11-27
Payer: COMMERCIAL

## 2023-11-27 VITALS
HEIGHT: 66 IN | WEIGHT: 211.4 LBS | DIASTOLIC BLOOD PRESSURE: 80 MMHG | RESPIRATION RATE: 18 BRPM | SYSTOLIC BLOOD PRESSURE: 122 MMHG | BODY MASS INDEX: 33.97 KG/M2 | TEMPERATURE: 98.1 F | HEART RATE: 90 BPM | OXYGEN SATURATION: 100 %

## 2023-11-27 DIAGNOSIS — R11.2 NAUSEA AND VOMITING, UNSPECIFIED VOMITING TYPE: ICD-10-CM

## 2023-11-27 DIAGNOSIS — K21.9 GASTROESOPHAGEAL REFLUX DISEASE WITHOUT ESOPHAGITIS: ICD-10-CM

## 2023-11-27 DIAGNOSIS — F41.9 ANXIETY: ICD-10-CM

## 2023-11-27 DIAGNOSIS — K29.00 ACUTE GASTRITIS WITHOUT HEMORRHAGE, UNSPECIFIED GASTRITIS TYPE: ICD-10-CM

## 2023-11-27 DIAGNOSIS — R10.13 EPIGASTRIC PAIN: Primary | ICD-10-CM

## 2023-11-27 PROCEDURE — 3078F DIAST BP <80 MM HG: CPT | Performed by: NURSE PRACTITIONER

## 2023-11-27 PROCEDURE — 3074F SYST BP LT 130 MM HG: CPT | Performed by: NURSE PRACTITIONER

## 2023-11-27 PROCEDURE — 99214 OFFICE O/P EST MOD 30 MIN: CPT | Performed by: NURSE PRACTITIONER

## 2023-11-27 RX ORDER — FAMOTIDINE 20 MG/1
40 TABLET, FILM COATED ORAL NIGHTLY
Qty: 28 TABLET | Refills: 0 | Status: SHIPPED | OUTPATIENT
Start: 2023-11-27 | End: 2023-12-11

## 2023-11-27 RX ORDER — PANTOPRAZOLE SODIUM 40 MG/1
40 TABLET, DELAYED RELEASE ORAL
Qty: 28 TABLET | Refills: 0 | Status: SHIPPED | OUTPATIENT
Start: 2023-11-27 | End: 2023-12-11

## 2023-11-27 ASSESSMENT — ENCOUNTER SYMPTOMS
BELCHING: 1
FLATUS: 1
NAUSEA: 1
CONSTIPATION: 0
ABDOMINAL PAIN: 1
VOMITING: 0
DIARRHEA: 1

## 2023-11-27 NOTE — PROGRESS NOTES
[facility]  [address] [place] [address]  Dept: [phone]  Loc: [address] Jalil Clark (:  1980) is a 37 y.o. female, here for evaluation of the following chief complaint(s):  Abdominal Pain (Pain started about 2 weeks ago. Pain is upper quadrant under sternum. Pt stated it hurts a lot of the time. Pt described pain as a dull ache. Pain level is 1 today. Pain calms down after eating. Pt stated anxiety triggers the pain. Drinking caffeine drinks also a trigger. Sometimes gets diarrhea. Pt noticed chest  pain with it but stated xanax took care of the stomach ache and chest pain. )      ASSESSMENT/PLAN:  1. Epigastric pain  -     famotidine (PEPCID) 20 MG tablet; Take 2 tablets by mouth at bedtime for 14 days, Disp-28 tablet, R-0Normal  -     pantoprazole (PROTONIX) 40 MG tablet; Take 1 tablet by mouth 2 times daily (before meals) for 14 days, Disp-28 tablet, R-0Normal  2. Gastroesophageal reflux disease without esophagitis  3. Anxiety  4. Acute gastritis without hemorrhage, unspecified gastritis type  -     famotidine (PEPCID) 20 MG tablet; Take 2 tablets by mouth at bedtime for 14 days, Disp-28 tablet, R-0Normal  -     pantoprazole (PROTONIX) 40 MG tablet; Take 1 tablet by mouth 2 times daily (before meals) for 14 days, Disp-28 tablet, R-0Normal  5. Nausea and vomiting, unspecified vomiting type  -     pantoprazole (PROTONIX) 40 MG tablet; Take 1 tablet by mouth 2 times daily (before meals) for 14 days, Disp-28 tablet, R-0Normal    Increase famotidine to 40 mg at bedtime. Continue pantoprazole twice a day. If this does not help call GI and discuss with them. Call psychiatrist and discuss worsening anxiety and depression. Call if you have any questions or concerns. Return for Regular follow up as scheduled and as needed. SUBJECTIVE/OBJECTIVE:  States pain has been present for about 2 weeks.  States

## 2023-11-28 ASSESSMENT — ENCOUNTER SYMPTOMS
SORE THROAT: 0
CHEST TIGHTNESS: 0
EYE PAIN: 0
SHORTNESS OF BREATH: 0
COUGH: 0

## 2024-01-30 ENCOUNTER — TELEPHONE (OUTPATIENT)
Dept: FAMILY MEDICINE CLINIC | Age: 44
End: 2024-01-30

## 2024-01-30 RX ORDER — CYCLOBENZAPRINE HCL 10 MG
10 TABLET ORAL 3 TIMES DAILY PRN
Qty: 21 TABLET | Refills: 0 | Status: SHIPPED | OUTPATIENT
Start: 2024-01-30 | End: 2024-02-09

## 2024-01-30 NOTE — TELEPHONE ENCOUNTER
Will prescribe some Flexeril which is a muscle relaxer to help with the surrounding tissues.  She is also recommended to try ice and then heat to see which 1 works best for her pain.  Also lidocaine patches over-the-counter can be quite helpful.  And as soon as she is able we recommend gentle stretches to lower the muscle spasms which contribute greatly to the pain in these cases.

## 2024-01-30 NOTE — TELEPHONE ENCOUNTER
Pt called in regarding her ongoing back pain. States she was seen in the ER this past Saturday and they told her she had a herniated disk.    She is already taking steroids but is wondering if there is anything that could be given or bought otc to get her through to her appointment on 2/7/24?

## 2024-02-02 ENCOUNTER — TELEPHONE (OUTPATIENT)
Dept: FAMILY MEDICINE CLINIC | Age: 44
End: 2024-02-02

## 2024-02-02 ENCOUNTER — HOSPITAL ENCOUNTER (EMERGENCY)
Age: 44
Discharge: HOME | End: 2024-02-02
Payer: COMMERCIAL

## 2024-02-02 VITALS
DIASTOLIC BLOOD PRESSURE: 63 MMHG | BODY MASS INDEX: 33.75 KG/M2 | WEIGHT: 210 LBS | SYSTOLIC BLOOD PRESSURE: 136 MMHG | RESPIRATION RATE: 16 BRPM | HEART RATE: 100 BPM | TEMPERATURE: 97.2 F | HEIGHT: 66 IN | OXYGEN SATURATION: 99 %

## 2024-02-02 DIAGNOSIS — M51.27 LUMBOSACRAL DISC HERNIATION: Primary | ICD-10-CM

## 2024-02-02 PROCEDURE — 99213 OFFICE O/P EST LOW 20 MIN: CPT | Performed by: NURSE PRACTITIONER

## 2024-02-02 PROCEDURE — 99213 OFFICE O/P EST LOW 20 MIN: CPT

## 2024-02-02 RX ORDER — PREDNISONE 20 MG/1
TABLET ORAL
Qty: 10 TABLET | Refills: 0 | Status: SHIPPED | OUTPATIENT
Start: 2024-02-02 | End: 2024-02-08

## 2024-02-02 RX ORDER — KETOROLAC TROMETHAMINE 10 MG/1
10 TABLET, FILM COATED ORAL EVERY 6 HOURS PRN
Qty: 20 TABLET | Refills: 0 | Status: SHIPPED | OUTPATIENT
Start: 2024-02-02

## 2024-02-02 ASSESSMENT — PAIN DESCRIPTION - PAIN TYPE: TYPE: ACUTE PAIN

## 2024-02-02 ASSESSMENT — PAIN DESCRIPTION - ORIENTATION: ORIENTATION: LOWER

## 2024-02-02 ASSESSMENT — PAIN DESCRIPTION - ONSET: ONSET: GRADUAL

## 2024-02-02 ASSESSMENT — ENCOUNTER SYMPTOMS
NAUSEA: 0
ABDOMINAL PAIN: 0
ABDOMINAL DISTENTION: 0
BLOOD IN STOOL: 0
SHORTNESS OF BREATH: 0
VOMITING: 0
BACK PAIN: 1

## 2024-02-02 ASSESSMENT — PAIN DESCRIPTION - FREQUENCY: FREQUENCY: INTERMITTENT

## 2024-02-02 ASSESSMENT — PAIN DESCRIPTION - LOCATION: LOCATION: BACK

## 2024-02-02 ASSESSMENT — PAIN DESCRIPTION - DESCRIPTORS: DESCRIPTORS: SHOOTING;SHARP

## 2024-02-02 ASSESSMENT — PAIN SCALES - GENERAL: PAINLEVEL_OUTOF10: 6

## 2024-02-02 NOTE — ED TRIAGE NOTES
Arrives to [facility] for the evaluation of lower back pain that started worsening 2 days ago.  Was seen at [facility] on 1/27/24 and had a CT scan of lumbar spine for c/o lower back pain.  Dx with herniation of intervertebral disc between L5 and S1.  Was prescribed Lidocaine patches and Prednisone dose pack.  Finished steroid yesterday.  Has an appt to be seen by PCP Dr Morris next Thurs 2/8/24.  Was prescribed Flexeril 10 mg and has taken 2 doses today so far.  Last dose of Flexeril was at 1300 today.  Pain in lower back rated 6/10 with movement and ambulation.  Did have a chiropractor adjustment today.  Also reports having numbness and tingling in hands and feet.  Has an appt coming up with Dr Morris and called the office today about getting a PT referral.  VSS.  Waiting provider to assess for orders.

## 2024-02-02 NOTE — ED PROVIDER NOTES
[facility] [place]  Urgent Care Encounter      CHIEF COMPLAINT       Chief Complaint   Patient presents with    Back Pain     Lower- Hx of Herniated Disk   Seen in the ER 1/27/24  Finished steroid prescription and pain is reoccurring     Numbness     Numbness and tingling in Hands and feet       Nurses Notes reviewed and I agree except as noted in the HPI.  HISTORY OF PRESENT ILLNESS   Hattie Mora is a 43 y.o. female who presents for evaluation of of low back pain secondary to herniated disc.  Patient was seen at [facility] emergency department.  CT scan showed possible herniated disc to the lumbosacral region.  Pain is midline, continuous.  Rates 6/10.  Pain causing sharp shooting sensations to bilateral hands and feet.  No weakness.  No loss of bowel or bladder control.  Patient states that she was doing well on steroids.  No improvement with ibuprofen.  No additional complaints.    REVIEW OF SYSTEMS     Review of Systems   Constitutional:  Negative for fatigue and fever.   Respiratory:  Negative for shortness of breath.    Cardiovascular:  Negative for chest pain.   Gastrointestinal:  Negative for abdominal distention, abdominal pain, blood in stool, nausea and vomiting.   Genitourinary:  Negative for difficulty urinating, dysuria and flank pain.   Musculoskeletal:  Positive for back pain. Negative for neck pain and neck stiffness.   Skin:  Negative for rash and wound.   Neurological:  Negative for weakness.   Hematological:  Does not bruise/bleed easily.       PAST MEDICAL HISTORY         Diagnosis Date    Anxiety     Depression     Hypertension     Hypothyroidism     Multiple allergies        SURGICAL HISTORY     Patient  has a past surgical history that includes Tonsillectomy.    CURRENT MEDICATIONS       Discharge Medication List as of 2/2/2024  2:51 PM        CONTINUE these medications which have NOT CHANGED    Details   cyclobenzaprine (FLEXERIL) 10 MG tablet Take 1 tablet by mouth 3 times  daily as needed for Muscle spasms, Disp-21 tablet, R-0Normal      linaCLOtide (LINZESS PO) Take by mouthHistorical Med      famotidine (PEPCID) 20 MG tablet Take 2 tablets by mouth at bedtime for 14 days, Disp-28 tablet, R-0Normal      pantoprazole (PROTONIX) 40 MG tablet Take 1 tablet by mouth 2 times daily (before meals) for 14 days, Disp-28 tablet, R-0Normal      ondansetron (ZOFRAN-ODT) 4 MG disintegrating tablet Take 1 tablet by mouth 3 times daily as needed for Nausea or Vomiting, Disp-90 tablet, R-1Clarification for order #[id number]      losartan-hydroCHLOROthiazide (HYZAAR) 50-12.5 MG per tablet Take 0.5 tablets by mouth daily, Disp-90 tablet, R-3Normal      desvenlafaxine succinate (PRISTIQ) 100 MG TB24 extended release tablet Take 1 tablet by mouth daily, Disp-90 tablet, R-3Normal      fluticasone (FLONASE) 50 MCG/ACT nasal spray 1 spray by Each Nostril route daily, Disp-3 each, R-3Normal      ARIPiprazole (ABILIFY) 10 MG tablet Take 2 tablets by mouth daily Dr McbrideoHistorical Med      hydrOXYzine HCl (ATARAX) 50 MG tablet Take 1 tablet by mouth 3 times daily as needed for ItchingHistorical Med      ALPRAZolam (XANAX) 0.5 MG tablet TAKE 1 TABLET BY MOUTH TWICE A DAY AS NEEDED FOR ANXIETYHistorical Med      prazosin (MINIPRESS) 1 MG capsule TAKE 1 CAPSULE BY MOUTH AT BEDTIMEHistorical Med      TRINTELLIX 20 MG TABS tablet DAWHistorical Med      aspirin 81 MG EC tablet Take 1 tablet by mouth dailyHistorical Med      Cetirizine HCl (ZYRTEC PO) Take 1 tablet by mouth dailyHistorical Med             ALLERGIES     Patient is is allergic to wellbutrin [bupropion] and trazodone and nefazodone.    FAMILY HISTORY     Patient'sfamily history includes Alcohol Abuse in her brother, brother, father, and sister; Breast Cancer in her mother and sister; Depression in her mother; Heart Attack in her brother and brother; Mental Illness in her father; Substance Abuse in her sister.    SOCIAL HISTORY     Patient  reports

## 2024-02-02 NOTE — TELEPHONE ENCOUNTER
Patient called regarding upcoming appointment with dr. Morris, she would like for the doctor to send her a referral to physical therapy before appt if possible, and would like to be contacted back with an answer.

## 2024-02-07 ENCOUNTER — OFFICE VISIT (OUTPATIENT)
Dept: FAMILY MEDICINE CLINIC | Age: 44
End: 2024-02-07

## 2024-02-07 VITALS
RESPIRATION RATE: 16 BRPM | TEMPERATURE: 98.5 F | HEART RATE: 90 BPM | SYSTOLIC BLOOD PRESSURE: 136 MMHG | BODY MASS INDEX: 34.13 KG/M2 | WEIGHT: 212.4 LBS | OXYGEN SATURATION: 98 % | DIASTOLIC BLOOD PRESSURE: 88 MMHG | HEIGHT: 66 IN

## 2024-02-07 DIAGNOSIS — F33.42 RECURRENT MAJOR DEPRESSIVE DISORDER, IN FULL REMISSION (HCC): ICD-10-CM

## 2024-02-07 DIAGNOSIS — R11.2 NAUSEA AND VOMITING, UNSPECIFIED VOMITING TYPE: ICD-10-CM

## 2024-02-07 DIAGNOSIS — K21.9 GASTROESOPHAGEAL REFLUX DISEASE WITHOUT ESOPHAGITIS: ICD-10-CM

## 2024-02-07 DIAGNOSIS — Z13.1 SCREENING FOR DIABETES MELLITUS (DM): ICD-10-CM

## 2024-02-07 DIAGNOSIS — E53.8 B12 DEFICIENCY: ICD-10-CM

## 2024-02-07 DIAGNOSIS — I10 ESSENTIAL HYPERTENSION: ICD-10-CM

## 2024-02-07 DIAGNOSIS — Z13.220 SCREENING, LIPID: ICD-10-CM

## 2024-02-07 DIAGNOSIS — Z00.00 HEALTHCARE MAINTENANCE: ICD-10-CM

## 2024-02-07 DIAGNOSIS — M54.50 ACUTE BILATERAL LOW BACK PAIN WITHOUT SCIATICA: Primary | ICD-10-CM

## 2024-02-07 RX ORDER — LOSARTAN POTASSIUM AND HYDROCHLOROTHIAZIDE 12.5; 5 MG/1; MG/1
0.5 TABLET ORAL DAILY
Qty: 90 TABLET | Refills: 3 | Status: SHIPPED | OUTPATIENT
Start: 2024-02-07

## 2024-02-07 RX ORDER — ONDANSETRON 4 MG/1
4 TABLET, ORALLY DISINTEGRATING ORAL 3 TIMES DAILY PRN
Qty: 90 TABLET | Refills: 1 | Status: SHIPPED | OUTPATIENT
Start: 2024-02-07

## 2024-02-07 RX ORDER — PANTOPRAZOLE SODIUM 40 MG/1
40 TABLET, DELAYED RELEASE ORAL
Qty: 180 TABLET | Refills: 1 | Status: SHIPPED | OUTPATIENT
Start: 2024-02-07 | End: 2024-08-05

## 2024-02-07 ASSESSMENT — PATIENT HEALTH QUESTIONNAIRE - PHQ9
SUM OF ALL RESPONSES TO PHQ QUESTIONS 1-9: 0
2. FEELING DOWN, DEPRESSED OR HOPELESS: 0
SUM OF ALL RESPONSES TO PHQ9 QUESTIONS 1 & 2: 0
SUM OF ALL RESPONSES TO PHQ QUESTIONS 1-9: 0
SUM OF ALL RESPONSES TO PHQ QUESTIONS 1-9: 0
3. TROUBLE FALLING OR STAYING ASLEEP: 0
9. THOUGHTS THAT YOU WOULD BE BETTER OFF DEAD, OR OF HURTING YOURSELF: 0
5. POOR APPETITE OR OVEREATING: 0
8. MOVING OR SPEAKING SO SLOWLY THAT OTHER PEOPLE COULD HAVE NOTICED. OR THE OPPOSITE, BEING SO FIGETY OR RESTLESS THAT YOU HAVE BEEN MOVING AROUND A LOT MORE THAN USUAL: 0
4. FEELING TIRED OR HAVING LITTLE ENERGY: 0
10. IF YOU CHECKED OFF ANY PROBLEMS, HOW DIFFICULT HAVE THESE PROBLEMS MADE IT FOR YOU TO DO YOUR WORK, TAKE CARE OF THINGS AT HOME, OR GET ALONG WITH OTHER PEOPLE: 0
7. TROUBLE CONCENTRATING ON THINGS, SUCH AS READING THE NEWSPAPER OR WATCHING TELEVISION: 0
1. LITTLE INTEREST OR PLEASURE IN DOING THINGS: 0
SUM OF ALL RESPONSES TO PHQ QUESTIONS 1-9: 0
6. FEELING BAD ABOUT YOURSELF - OR THAT YOU ARE A FAILURE OR HAVE LET YOURSELF OR YOUR FAMILY DOWN: 0

## 2024-02-07 NOTE — PROGRESS NOTES
Hattie Mora (:  1980) is a 43 y.o. female,Established patient, here for evaluation of the following chief complaint(s):  Follow-up (Was having back pain and has one day of steroids left, she can feel the pain but it is a dull pain as of right now and not excruciating like it was. )       ASSESSMENT/PLAN:  1. Acute bilateral low back pain without sciatica  2. Essential hypertension  -     losartan-hydroCHLOROthiazide (HYZAAR) 50-12.5 MG per tablet; Take 0.5 tablets by mouth daily, Disp-90 tablet, R-3Normal  3. Nausea and vomiting, unspecified vomiting type  -     pantoprazole (PROTONIX) 40 MG tablet; Take 1 tablet by mouth 2 times daily (before meals), Disp-180 tablet, R-1Normal  -     ondansetron (ZOFRAN-ODT) 4 MG disintegrating tablet; Take 1 tablet by mouth 3 times daily as needed for Nausea or Vomiting, Disp-90 tablet, R-1Normal  4. Screening for diabetes mellitus (DM)  -     Hemoglobin A1C; Future  5. Screening, lipid  -     Lipid Panel; Future  6. Healthcare maintenance  -     Hemoglobin A1C; Future  -     Lipid Panel; Future  -     Comprehensive Metabolic Panel; Future  -     Vitamin B12 & Folate; Future  -     TSH With Reflex Ft4; Future  7. B12 deficiency  -     Vitamin B12 & Folate; Future  8. Recurrent major depressive disorder, in full remission (HCC)  9. Gastroesophageal reflux disease without esophagitis  -     pantoprazole (PROTONIX) 40 MG tablet; Take 1 tablet by mouth 2 times daily (before meals), Disp-180 tablet, R-1Normal    Back is better. No red flags.    She will work on core strengthening and hip toning.  Refills as above  Continue with psychiatry/psychology support.    Return in about 6 months (around 2024).         Subjective   SUBJECTIVE/OBJECTIVE:  HPI  When got up in am, started right away with sharp pain. Thought it was kidney stone but went to ER. Back troubles was dx.Now better, slow movement.   2nd round of steroids and muscle relaxers.  Pain into her buttocks,

## 2024-02-16 ENCOUNTER — HOSPITAL ENCOUNTER (OUTPATIENT)
Age: 44
Discharge: HOME | End: 2024-02-16
Payer: COMMERCIAL

## 2024-02-16 DIAGNOSIS — Z00.00 HEALTHCARE MAINTENANCE: ICD-10-CM

## 2024-02-16 DIAGNOSIS — Z13.1 SCREENING FOR DIABETES MELLITUS (DM): ICD-10-CM

## 2024-02-16 DIAGNOSIS — Z13.220 SCREENING, LIPID: ICD-10-CM

## 2024-02-16 DIAGNOSIS — E53.8 B12 DEFICIENCY: ICD-10-CM

## 2024-02-16 LAB
ALBUMIN SERPL BCG-MCNC: 4.6 G/DL (ref 3.5–5.1)
ALP SERPL-CCNC: 50 U/L (ref 38–126)
ALT SERPL W/O P-5'-P-CCNC: 12 U/L (ref 11–66)
ANION GAP SERPL CALC-SCNC: 12 MEQ/L (ref 8–16)
AST SERPL-CCNC: 13 U/L (ref 5–40)
BILIRUB SERPL-MCNC: 0.3 MG/DL (ref 0.3–1.2)
BUN SERPL-MCNC: 17 MG/DL (ref 7–22)
CALCIUM SERPL-MCNC: 9.3 MG/DL (ref 8.5–10.5)
CHLORIDE SERPL-SCNC: 103 MEQ/L (ref 98–111)
CHOLEST SERPL-MCNC: 279 MG/DL (ref 100–199)
CO2 SERPL-SCNC: 25 MEQ/L (ref 23–33)
CREAT SERPL-MCNC: 0.8 MG/DL (ref 0.4–1.2)
DEPRECATED MEAN GLUCOSE BLD GHB EST-ACNC: 96 MG/DL (ref 70–126)
FOLATE SERPL-MCNC: 6.4 NG/ML (ref 4.8–24.2)
GFR SERPL CREATININE-BSD FRML MDRD: > 60 ML/MIN/1.73M2
GLUCOSE SERPL-MCNC: 92 MG/DL (ref 70–108)
HBA1C MFR BLD HPLC: 5.2 % (ref 4.4–6.4)
HDLC SERPL-MCNC: 50 MG/DL
LDLC SERPL CALC-MCNC: 210 MG/DL
POTASSIUM SERPL-SCNC: 4 MEQ/L (ref 3.5–5.2)
PROT SERPL-MCNC: 6.9 G/DL (ref 6.1–8)
SODIUM SERPL-SCNC: 140 MEQ/L (ref 135–145)
TRIGL SERPL-MCNC: 94 MG/DL (ref 0–199)
TSH SERPL DL<=0.005 MIU/L-ACNC: 1.71 UIU/ML (ref 0.4–4.2)
VIT B12 SERPL-MCNC: 1136 PG/ML (ref 211–911)

## 2024-02-16 PROCEDURE — 80061 LIPID PANEL: CPT

## 2024-02-16 PROCEDURE — 82607 VITAMIN B-12: CPT

## 2024-02-16 PROCEDURE — 84443 ASSAY THYROID STIM HORMONE: CPT

## 2024-02-16 PROCEDURE — 83036 HEMOGLOBIN GLYCOSYLATED A1C: CPT

## 2024-02-16 PROCEDURE — 82746 ASSAY OF FOLIC ACID SERUM: CPT

## 2024-02-16 PROCEDURE — 80053 COMPREHEN METABOLIC PANEL: CPT

## 2024-02-16 PROCEDURE — 36415 COLL VENOUS BLD VENIPUNCTURE: CPT

## 2024-06-19 DIAGNOSIS — K29.00 ACUTE GASTRITIS WITHOUT HEMORRHAGE, UNSPECIFIED GASTRITIS TYPE: ICD-10-CM

## 2024-06-19 DIAGNOSIS — R10.13 EPIGASTRIC PAIN: ICD-10-CM

## 2024-06-20 RX ORDER — FAMOTIDINE 20 MG/1
TABLET, FILM COATED ORAL
COMMUNITY
Start: 2024-04-25 | End: 2024-06-20

## 2024-06-20 RX ORDER — FAMOTIDINE 20 MG/1
20 TABLET, FILM COATED ORAL NIGHTLY
Qty: 90 TABLET | Refills: 0 | Status: SHIPPED | OUTPATIENT
Start: 2024-06-20

## 2024-06-20 RX ORDER — LAMOTRIGINE 25 MG/1
TABLET ORAL
COMMUNITY
Start: 2024-06-12

## 2024-06-20 NOTE — TELEPHONE ENCOUNTER
Hattie Mora called requesting a refill on the following medications:  Requested Prescriptions     Pending Prescriptions Disp Refills    famotidine (PEPCID) 20 MG tablet [Pharmacy Med Name: Famotidine 20 MG Oral Tablet] 90 tablet 3     Sig: TAKE 1 TABLET BY MOUTH AT  BEDTIME       Date of last visit: 2/7/2024  Date of next visit (if applicable):8/7/2024  Date of last refill: 000  Pharmacy Name: 000    I see pantoprazole on this patient per last visit in February. Pepcid OTC?      Thanks,  Vidhi Bernard MA

## 2024-08-07 ENCOUNTER — OFFICE VISIT (OUTPATIENT)
Dept: FAMILY MEDICINE CLINIC | Age: 44
End: 2024-08-07
Payer: COMMERCIAL

## 2024-08-07 VITALS
RESPIRATION RATE: 16 BRPM | BODY MASS INDEX: 33.11 KG/M2 | OXYGEN SATURATION: 96 % | HEIGHT: 66 IN | DIASTOLIC BLOOD PRESSURE: 70 MMHG | HEART RATE: 87 BPM | WEIGHT: 206 LBS | SYSTOLIC BLOOD PRESSURE: 128 MMHG

## 2024-08-07 DIAGNOSIS — K21.9 GASTROESOPHAGEAL REFLUX DISEASE WITHOUT ESOPHAGITIS: ICD-10-CM

## 2024-08-07 DIAGNOSIS — F41.9 ANXIETY: ICD-10-CM

## 2024-08-07 DIAGNOSIS — R11.2 NAUSEA AND VOMITING, UNSPECIFIED VOMITING TYPE: ICD-10-CM

## 2024-08-07 DIAGNOSIS — R42 LIGHTHEADEDNESS: Primary | ICD-10-CM

## 2024-08-07 DIAGNOSIS — R00.2 PALPITATIONS: ICD-10-CM

## 2024-08-07 DIAGNOSIS — R51.9 NONINTRACTABLE HEADACHE, UNSPECIFIED CHRONICITY PATTERN, UNSPECIFIED HEADACHE TYPE: ICD-10-CM

## 2024-08-07 DIAGNOSIS — F33.41 RECURRENT MAJOR DEPRESSIVE DISORDER, IN PARTIAL REMISSION (HCC): ICD-10-CM

## 2024-08-07 DIAGNOSIS — R55 PRE-SYNCOPE: ICD-10-CM

## 2024-08-07 PROCEDURE — 99214 OFFICE O/P EST MOD 30 MIN: CPT | Performed by: FAMILY MEDICINE

## 2024-08-07 PROCEDURE — 3074F SYST BP LT 130 MM HG: CPT | Performed by: FAMILY MEDICINE

## 2024-08-07 PROCEDURE — 3078F DIAST BP <80 MM HG: CPT | Performed by: FAMILY MEDICINE

## 2024-08-07 RX ORDER — PANTOPRAZOLE SODIUM 40 MG/1
40 TABLET, DELAYED RELEASE ORAL
Qty: 180 TABLET | Refills: 3 | Status: SHIPPED | OUTPATIENT
Start: 2024-08-07 | End: 2025-08-07

## 2024-08-07 SDOH — ECONOMIC STABILITY: FOOD INSECURITY: WITHIN THE PAST 12 MONTHS, YOU WORRIED THAT YOUR FOOD WOULD RUN OUT BEFORE YOU GOT MONEY TO BUY MORE.: NEVER TRUE

## 2024-08-07 SDOH — ECONOMIC STABILITY: FOOD INSECURITY: WITHIN THE PAST 12 MONTHS, THE FOOD YOU BOUGHT JUST DIDN'T LAST AND YOU DIDN'T HAVE MONEY TO GET MORE.: NEVER TRUE

## 2024-08-07 SDOH — ECONOMIC STABILITY: INCOME INSECURITY: HOW HARD IS IT FOR YOU TO PAY FOR THE VERY BASICS LIKE FOOD, HOUSING, MEDICAL CARE, AND HEATING?: NOT HARD AT ALL

## 2024-08-07 NOTE — PROGRESS NOTES
Hattie Mora (:  1980) is a 44 y.o. female,Established patient, here for evaluation of the following chief complaint(s):  Follow-up (States she has been getting lightheaded a lot over the past couple. Has had some headaches. Random times. )      Assessment & Plan   ASSESSMENT/PLAN:  1. Lightheadedness  -     EKG 12 lead; Future  -     CBC with Auto Differential; Future  -     Magnesium; Future  -     Basic Metabolic Panel; Future  -     Cortisol Total; Future  -     Extended cardiac holter monitor (3 days-14 day); Future  2. Anxiety  -     CBC with Auto Differential; Future  -     Magnesium; Future  -     Basic Metabolic Panel; Future  -     Cortisol Total; Future  3. Recurrent major depressive disorder, in partial remission (HCC)  -     CBC with Auto Differential; Future  -     Magnesium; Future  -     Basic Metabolic Panel; Future  -     Cortisol Total; Future  4. Nausea and vomiting, unspecified vomiting type  -     pantoprazole (PROTONIX) 40 MG tablet; Take 1 tablet by mouth 2 times daily (before meals), Disp-180 tablet, R-3Normal  -     EKG 12 lead; Future  -     CBC with Auto Differential; Future  -     Magnesium; Future  -     Basic Metabolic Panel; Future  -     Cortisol Total; Future  5. Gastroesophageal reflux disease without esophagitis  -     pantoprazole (PROTONIX) 40 MG tablet; Take 1 tablet by mouth 2 times daily (before meals), Disp-180 tablet, R-3Normal  -     CBC with Auto Differential; Future  -     Magnesium; Future  -     Basic Metabolic Panel; Future  -     Cortisol Total; Future  6. Palpitations  -     EKG 12 lead; Future  -     CBC with Auto Differential; Future  -     Magnesium; Future  -     Basic Metabolic Panel; Future  -     Cortisol Total; Future  -     Extended cardiac holter monitor (3 days-14 day); Future  7. Nonintractable headache, unspecified chronicity pattern, unspecified headache type  -     CBC with Auto Differential; Future  -     Magnesium; Future  -     Basic

## 2024-08-08 ENCOUNTER — HOSPITAL ENCOUNTER (OUTPATIENT)
Age: 44
Discharge: HOME | End: 2024-08-08
Payer: COMMERCIAL

## 2024-08-08 DIAGNOSIS — F41.9 ANXIETY: ICD-10-CM

## 2024-08-08 DIAGNOSIS — F33.41 RECURRENT MAJOR DEPRESSIVE DISORDER, IN PARTIAL REMISSION (HCC): ICD-10-CM

## 2024-08-08 DIAGNOSIS — R11.2 NAUSEA AND VOMITING, UNSPECIFIED VOMITING TYPE: ICD-10-CM

## 2024-08-08 DIAGNOSIS — R00.2 PALPITATIONS: ICD-10-CM

## 2024-08-08 DIAGNOSIS — K21.9 GASTROESOPHAGEAL REFLUX DISEASE WITHOUT ESOPHAGITIS: ICD-10-CM

## 2024-08-08 DIAGNOSIS — R51.9 NONINTRACTABLE HEADACHE, UNSPECIFIED CHRONICITY PATTERN, UNSPECIFIED HEADACHE TYPE: ICD-10-CM

## 2024-08-08 DIAGNOSIS — R42 LIGHTHEADEDNESS: ICD-10-CM

## 2024-08-08 LAB
ANION GAP SERPL CALC-SCNC: 14 MEQ/L (ref 8–16)
BASOPHILS ABSOLUTE: 0.1 THOU/MM3 (ref 0–0.1)
BASOPHILS NFR BLD AUTO: 1 %
BUN SERPL-MCNC: 9 MG/DL (ref 7–22)
CALCIUM SERPL-MCNC: 9.2 MG/DL (ref 8.5–10.5)
CHLORIDE SERPL-SCNC: 101 MEQ/L (ref 98–111)
CO2 SERPL-SCNC: 24 MEQ/L (ref 23–33)
CORTIS SERPL-MCNC: 8.89 UG/DL
CORTISOL COLLECTION INFO: NORMAL
CREAT SERPL-MCNC: 0.8 MG/DL (ref 0.4–1.2)
DEPRECATED RDW RBC AUTO: 41.6 FL (ref 35–45)
EKG ATRIAL RATE: 84 BPM
EKG P AXIS: 78 DEGREES
EKG P-R INTERVAL: 152 MS
EKG Q-T INTERVAL: 384 MS
EKG QRS DURATION: 68 MS
EKG QTC CALCULATION (BAZETT): 453 MS
EKG R AXIS: 52 DEGREES
EKG T AXIS: 58 DEGREES
EKG VENTRICULAR RATE: 84 BPM
EOSINOPHIL NFR BLD AUTO: 7.5 %
EOSINOPHILS ABSOLUTE: 0.5 THOU/MM3 (ref 0–0.4)
ERYTHROCYTE [DISTWIDTH] IN BLOOD BY AUTOMATED COUNT: 12.8 % (ref 11.5–14.5)
GFR SERPL CREATININE-BSD FRML MDRD: > 90 ML/MIN/1.73M2
GLUCOSE SERPL-MCNC: 93 MG/DL (ref 70–108)
HCT VFR BLD AUTO: 45.7 % (ref 37–47)
HGB BLD-MCNC: 14.9 GM/DL (ref 12–16)
IMM GRANULOCYTES # BLD AUTO: 0.03 THOU/MM3 (ref 0–0.07)
IMM GRANULOCYTES NFR BLD AUTO: 0.4 %
LYMPHOCYTES ABSOLUTE: 2.1 THOU/MM3 (ref 1–4.8)
LYMPHOCYTES NFR BLD AUTO: 30.9 %
MAGNESIUM SERPL-MCNC: 2.3 MG/DL (ref 1.6–2.4)
MCH RBC QN AUTO: 28.8 PG (ref 26–33)
MCHC RBC AUTO-ENTMCNC: 32.6 GM/DL (ref 32.2–35.5)
MCV RBC AUTO: 88.2 FL (ref 81–99)
MONOCYTES ABSOLUTE: 0.5 THOU/MM3 (ref 0.4–1.3)
MONOCYTES NFR BLD AUTO: 7.9 %
NEUTROPHILS ABSOLUTE: 3.6 THOU/MM3 (ref 1.8–7.7)
NEUTROPHILS NFR BLD AUTO: 52.3 %
NRBC BLD AUTO-RTO: 0 /100 WBC
PLATELET # BLD AUTO: 270 THOU/MM3 (ref 130–400)
PMV BLD AUTO: 10.2 FL (ref 9.4–12.4)
POTASSIUM SERPL-SCNC: 4.6 MEQ/L (ref 3.5–5.2)
RBC # BLD AUTO: 5.18 MILL/MM3 (ref 4.2–5.4)
SODIUM SERPL-SCNC: 139 MEQ/L (ref 135–145)
WBC # BLD AUTO: 6.8 THOU/MM3 (ref 4.8–10.8)

## 2024-08-08 PROCEDURE — 83735 ASSAY OF MAGNESIUM: CPT

## 2024-08-08 PROCEDURE — 36415 COLL VENOUS BLD VENIPUNCTURE: CPT

## 2024-08-08 PROCEDURE — 80048 BASIC METABOLIC PNL TOTAL CA: CPT

## 2024-08-08 PROCEDURE — 85025 COMPLETE CBC W/AUTO DIFF WBC: CPT

## 2024-08-08 PROCEDURE — 82533 TOTAL CORTISOL: CPT

## 2024-08-08 PROCEDURE — 93005 ELECTROCARDIOGRAM TRACING: CPT

## 2024-08-08 PROCEDURE — 93010 ELECTROCARDIOGRAM REPORT: CPT | Performed by: INTERNAL MEDICINE

## 2024-08-19 ENCOUNTER — HOSPITAL ENCOUNTER (OUTPATIENT)
Age: 44
Discharge: HOME | End: 2024-08-21
Attending: FAMILY MEDICINE
Payer: COMMERCIAL

## 2024-08-19 DIAGNOSIS — R42 LIGHTHEADEDNESS: ICD-10-CM

## 2024-08-19 DIAGNOSIS — R00.2 PALPITATIONS: ICD-10-CM

## 2024-08-19 DIAGNOSIS — R55 PRE-SYNCOPE: ICD-10-CM

## 2024-08-19 PROCEDURE — 93242 EXT ECG>48HR<7D RECORDING: CPT

## 2024-08-26 DIAGNOSIS — K29.00 ACUTE GASTRITIS WITHOUT HEMORRHAGE, UNSPECIFIED GASTRITIS TYPE: ICD-10-CM

## 2024-08-26 DIAGNOSIS — R10.13 EPIGASTRIC PAIN: ICD-10-CM

## 2024-08-27 RX ORDER — FAMOTIDINE 20 MG/1
20 TABLET, FILM COATED ORAL NIGHTLY
Qty: 90 TABLET | Refills: 3 | Status: SHIPPED | OUTPATIENT
Start: 2024-08-27

## 2024-08-27 NOTE — TELEPHONE ENCOUNTER
Hattie Mora called requesting a refill on the following medications:  Requested Prescriptions     Pending Prescriptions Disp Refills    famotidine (PEPCID) 20 MG tablet [Pharmacy Med Name: Famotidine 20 MG Oral Tablet] 90 tablet 3     Sig: TAKE 1 TABLET BY MOUTH AT  BEDTIME       Date of last visit: 8/7/2024  Date of next visit (if applicable):11/18/2024  Date of last refill: 6/20/24  Pharmacy Name: [facility] LPN

## 2024-09-13 DIAGNOSIS — R11.2 NAUSEA AND VOMITING, UNSPECIFIED VOMITING TYPE: ICD-10-CM

## 2024-09-13 RX ORDER — ONDANSETRON 4 MG/1
4 TABLET, ORALLY DISINTEGRATING ORAL 3 TIMES DAILY PRN
Qty: 90 TABLET | Refills: 1 | Status: SHIPPED | OUTPATIENT
Start: 2024-09-13

## 2024-11-18 ENCOUNTER — OFFICE VISIT (OUTPATIENT)
Dept: FAMILY MEDICINE CLINIC | Age: 44
End: 2024-11-18

## 2024-11-18 VITALS
BODY MASS INDEX: 37.19 KG/M2 | DIASTOLIC BLOOD PRESSURE: 78 MMHG | SYSTOLIC BLOOD PRESSURE: 112 MMHG | OXYGEN SATURATION: 97 % | WEIGHT: 230.4 LBS | HEART RATE: 84 BPM

## 2024-11-18 DIAGNOSIS — R11.2 NAUSEA AND VOMITING, UNSPECIFIED VOMITING TYPE: ICD-10-CM

## 2024-11-18 DIAGNOSIS — Z71.3 DIETARY COUNSELING: ICD-10-CM

## 2024-11-18 DIAGNOSIS — I10 ESSENTIAL HYPERTENSION: Chronic | ICD-10-CM

## 2024-11-18 DIAGNOSIS — F33.41 RECURRENT MAJOR DEPRESSIVE DISORDER, IN PARTIAL REMISSION (HCC): ICD-10-CM

## 2024-11-18 DIAGNOSIS — K21.9 GASTROESOPHAGEAL REFLUX DISEASE WITHOUT ESOPHAGITIS: Primary | ICD-10-CM

## 2024-11-18 DIAGNOSIS — F17.200 SMOKER: ICD-10-CM

## 2024-11-18 RX ORDER — ONDANSETRON 4 MG/1
4 TABLET, ORALLY DISINTEGRATING ORAL 3 TIMES DAILY PRN
Qty: 90 TABLET | Refills: 1 | Status: SHIPPED | OUTPATIENT
Start: 2024-11-18

## 2024-11-18 SDOH — ECONOMIC STABILITY: FOOD INSECURITY: WITHIN THE PAST 12 MONTHS, YOU WORRIED THAT YOUR FOOD WOULD RUN OUT BEFORE YOU GOT MONEY TO BUY MORE.: NEVER TRUE

## 2024-11-18 SDOH — ECONOMIC STABILITY: FOOD INSECURITY: WITHIN THE PAST 12 MONTHS, THE FOOD YOU BOUGHT JUST DIDN'T LAST AND YOU DIDN'T HAVE MONEY TO GET MORE.: NEVER TRUE

## 2024-11-18 SDOH — ECONOMIC STABILITY: INCOME INSECURITY: HOW HARD IS IT FOR YOU TO PAY FOR THE VERY BASICS LIKE FOOD, HOUSING, MEDICAL CARE, AND HEATING?: NOT HARD AT ALL

## 2024-11-18 NOTE — PATIENT INSTRUCTIONS
Add fiber -- legumes/beans  (soups), lots of vegetables (salad kits, frozen vegetable, deli prepped)    Fish twice a week (wild) or okay more if farm raised.      Continue olive oil use     Continue to limit added sugars (aim for < 25 grams)

## 2024-11-18 NOTE — PROGRESS NOTES
note, and support improvement of the AI technology. The patient (or guardian, if applicable) and other individuals in attendance at the appointment consented to the use of AI, including the recording.        This office note may have been at least partially dictated. Effort was made to review for errors but some may have been missed. Please contact author of note for clarification if needed.     An electronic signature was used to authenticate this note.    --Lesley Morris MD

## 2025-02-12 DIAGNOSIS — I10 ESSENTIAL HYPERTENSION: ICD-10-CM

## 2025-02-13 RX ORDER — LOSARTAN POTASSIUM AND HYDROCHLOROTHIAZIDE 12.5; 5 MG/1; MG/1
0.5 TABLET ORAL DAILY
Qty: 45 TABLET | Refills: 0 | Status: SHIPPED | OUTPATIENT
Start: 2025-02-13

## 2025-02-13 NOTE — TELEPHONE ENCOUNTER
Hattie Mora called requesting a refill on the following medications:  Requested Prescriptions     Pending Prescriptions Disp Refills    losartan-hydroCHLOROthiazide (HYZAAR) 50-12.5 MG per tablet [Pharmacy Med Name: Losartan Potassium-HCTZ 50-12.5 MG Oral Tablet] 45 tablet 3     Sig: TAKE ONE-HALF TABLET BY MOUTH  DAILY       Date of last visit: 11/18/2024  Date of next visit (if applicable):3/19/2025  Date of last refill: 2/7/24  Pharmacy Name: [facility]ara MA

## 2025-02-26 DIAGNOSIS — R11.2 NAUSEA AND VOMITING, UNSPECIFIED VOMITING TYPE: ICD-10-CM

## 2025-02-26 RX ORDER — ONDANSETRON 4 MG/1
4 TABLET, ORALLY DISINTEGRATING ORAL 3 TIMES DAILY PRN
Qty: 90 TABLET | Refills: 0 | Status: SHIPPED | OUTPATIENT
Start: 2025-02-26

## 2025-02-26 NOTE — TELEPHONE ENCOUNTER
Hattie Mora called requesting a refill on the following medications:  Requested Prescriptions     Pending Prescriptions Disp Refills    ondansetron (ZOFRAN-ODT) 4 MG disintegrating tablet 90 tablet 1     Sig: Take 1 tablet by mouth 3 times daily as needed for Nausea or Vomiting       Date of last visit: 11/18/2024  Date of next visit (if applicable):3/19/2025  Date of last refill: 11/18/24   Pharmacy Name: [facility]       Thanks,  Leanne Milton MA

## 2025-03-19 ENCOUNTER — OFFICE VISIT (OUTPATIENT)
Dept: FAMILY MEDICINE CLINIC | Age: 45
End: 2025-03-19

## 2025-03-19 VITALS
SYSTOLIC BLOOD PRESSURE: 118 MMHG | WEIGHT: 219 LBS | BODY MASS INDEX: 35.35 KG/M2 | HEART RATE: 86 BPM | DIASTOLIC BLOOD PRESSURE: 72 MMHG | OXYGEN SATURATION: 98 %

## 2025-03-19 DIAGNOSIS — Z71.6 ENCOUNTER FOR SMOKING CESSATION COUNSELING: ICD-10-CM

## 2025-03-19 DIAGNOSIS — K44.9 HIATAL HERNIA WITH GERD: ICD-10-CM

## 2025-03-19 DIAGNOSIS — M25.551 BILATERAL HIP PAIN: Primary | ICD-10-CM

## 2025-03-19 DIAGNOSIS — K21.9 GASTROESOPHAGEAL REFLUX DISEASE WITHOUT ESOPHAGITIS: ICD-10-CM

## 2025-03-19 DIAGNOSIS — M25.552 BILATERAL HIP PAIN: Primary | ICD-10-CM

## 2025-03-19 DIAGNOSIS — F10.11 HISTORY OF ALCOHOL ABUSE: ICD-10-CM

## 2025-03-19 DIAGNOSIS — Z13.1 SCREENING FOR DIABETES MELLITUS: ICD-10-CM

## 2025-03-19 DIAGNOSIS — I10 ESSENTIAL HYPERTENSION: ICD-10-CM

## 2025-03-19 DIAGNOSIS — E53.8 B12 DEFICIENCY: ICD-10-CM

## 2025-03-19 DIAGNOSIS — Z13.220 SCREENING, LIPID: ICD-10-CM

## 2025-03-19 DIAGNOSIS — Z00.00 HEALTHCARE MAINTENANCE: ICD-10-CM

## 2025-03-19 DIAGNOSIS — J30.1 SEASONAL ALLERGIC RHINITIS DUE TO POLLEN: ICD-10-CM

## 2025-03-19 DIAGNOSIS — K21.9 HIATAL HERNIA WITH GERD: ICD-10-CM

## 2025-03-19 DIAGNOSIS — F33.41 RECURRENT MAJOR DEPRESSIVE DISORDER, IN PARTIAL REMISSION: ICD-10-CM

## 2025-03-19 RX ORDER — FLUTICASONE PROPIONATE 50 MCG
1 SPRAY, SUSPENSION (ML) NASAL DAILY
Qty: 3 EACH | Refills: 3 | Status: SHIPPED | OUTPATIENT
Start: 2025-03-19

## 2025-03-19 RX ORDER — VARENICLINE TARTRATE 1 MG/1
1 TABLET, FILM COATED ORAL 2 TIMES DAILY
Qty: 60 TABLET | Refills: 2 | Status: SHIPPED | OUTPATIENT
Start: 2025-04-19

## 2025-03-19 RX ORDER — VARENICLINE TARTRATE 0.5 MG/1
.5-1 TABLET, FILM COATED ORAL SEE ADMIN INSTRUCTIONS
Qty: 57 TABLET | Refills: 0 | Status: SHIPPED | OUTPATIENT
Start: 2025-03-19

## 2025-03-19 RX ORDER — LOSARTAN POTASSIUM AND HYDROCHLOROTHIAZIDE 12.5; 5 MG/1; MG/1
0.5 TABLET ORAL DAILY
Qty: 45 TABLET | Refills: 3 | Status: SHIPPED | OUTPATIENT
Start: 2025-03-19

## 2025-03-19 SDOH — ECONOMIC STABILITY: FOOD INSECURITY: WITHIN THE PAST 12 MONTHS, THE FOOD YOU BOUGHT JUST DIDN'T LAST AND YOU DIDN'T HAVE MONEY TO GET MORE.: NEVER TRUE

## 2025-03-19 SDOH — ECONOMIC STABILITY: FOOD INSECURITY: WITHIN THE PAST 12 MONTHS, YOU WORRIED THAT YOUR FOOD WOULD RUN OUT BEFORE YOU GOT MONEY TO BUY MORE.: NEVER TRUE

## 2025-03-19 ASSESSMENT — PATIENT HEALTH QUESTIONNAIRE - PHQ9
3. TROUBLE FALLING OR STAYING ASLEEP: MORE THAN HALF THE DAYS
SUM OF ALL RESPONSES TO PHQ QUESTIONS 1-9: 2
2. FEELING DOWN, DEPRESSED OR HOPELESS: NOT AT ALL
6. FEELING BAD ABOUT YOURSELF - OR THAT YOU ARE A FAILURE OR HAVE LET YOURSELF OR YOUR FAMILY DOWN: NOT AT ALL
5. POOR APPETITE OR OVEREATING: NOT AT ALL
10. IF YOU CHECKED OFF ANY PROBLEMS, HOW DIFFICULT HAVE THESE PROBLEMS MADE IT FOR YOU TO DO YOUR WORK, TAKE CARE OF THINGS AT HOME, OR GET ALONG WITH OTHER PEOPLE: NOT DIFFICULT AT ALL
SUM OF ALL RESPONSES TO PHQ QUESTIONS 1-9: 2
9. THOUGHTS THAT YOU WOULD BE BETTER OFF DEAD, OR OF HURTING YOURSELF: NOT AT ALL
7. TROUBLE CONCENTRATING ON THINGS, SUCH AS READING THE NEWSPAPER OR WATCHING TELEVISION: NOT AT ALL
SUM OF ALL RESPONSES TO PHQ QUESTIONS 1-9: 2
8. MOVING OR SPEAKING SO SLOWLY THAT OTHER PEOPLE COULD HAVE NOTICED. OR THE OPPOSITE, BEING SO FIGETY OR RESTLESS THAT YOU HAVE BEEN MOVING AROUND A LOT MORE THAN USUAL: NOT AT ALL
SUM OF ALL RESPONSES TO PHQ QUESTIONS 1-9: 2
4. FEELING TIRED OR HAVING LITTLE ENERGY: NOT AT ALL
1. LITTLE INTEREST OR PLEASURE IN DOING THINGS: NOT AT ALL

## 2025-03-19 NOTE — PROGRESS NOTES
bilaterally negative. Hip ROM okay bilaterally. Equivocal alcira. IT stretch negative.  Hip flexor testing associated with cramping.    Neurological:      Mental Status: She is alert.   Psychiatric:         Mood and Affect: Mood normal.         Behavior: Behavior normal.        Lab Results   Component Value Date    RCRHIFEH65 1136 (H) 02/16/2024     Lab Results   Component Value Date     08/08/2024    K 4.6 08/08/2024     08/08/2024    CO2 24 08/08/2024    BUN 9 08/08/2024    CREATININE 0.8 08/08/2024    GLUCOSE 93 08/08/2024    CALCIUM 9.2 08/08/2024    BILITOT 0.3 02/16/2024    ALKPHOS 50 02/16/2024    AST 13 02/16/2024    ALT 12 02/16/2024    LABGLOM > 90 08/08/2024       Lab Results   Component Value Date    WBC 6.8 08/08/2024    HGB 14.9 08/08/2024    HCT 45.7 08/08/2024    MCV 88.2 08/08/2024     08/08/2024       All questions and concerns addressed as best as possible. Discussed possible side effects of medications. Patient voiced understanding of plan of care.       This office note may have been at least partially dictated. Effort was made to review for errors but some may have been missed. Please contact author of note for clarification if needed.     An electronic signature was used to authenticate this note.    --Lesley Morris MD

## 2025-03-19 NOTE — PATIENT INSTRUCTIONS
Check BP and if top number < 105 or less and bottom number is <55 or less then hold BP medication       
23-Jan-2017 17:38

## 2025-03-22 PROBLEM — K44.9 HIATAL HERNIA WITH GERD: Status: ACTIVE | Noted: 2025-03-22

## 2025-03-22 PROBLEM — K21.9 HIATAL HERNIA WITH GERD: Status: ACTIVE | Noted: 2025-03-22

## 2025-03-22 ASSESSMENT — ENCOUNTER SYMPTOMS: SHORTNESS OF BREATH: 0

## 2025-05-22 DIAGNOSIS — K21.9 GASTROESOPHAGEAL REFLUX DISEASE WITHOUT ESOPHAGITIS: ICD-10-CM

## 2025-05-22 DIAGNOSIS — R11.2 NAUSEA AND VOMITING, UNSPECIFIED VOMITING TYPE: ICD-10-CM

## 2025-05-22 RX ORDER — PANTOPRAZOLE SODIUM 40 MG/1
40 TABLET, DELAYED RELEASE ORAL
Qty: 180 TABLET | Refills: 0 | Status: SHIPPED | OUTPATIENT
Start: 2025-05-22 | End: 2026-05-22

## 2025-05-22 NOTE — TELEPHONE ENCOUNTER
Hattie Mora called requesting a refill on the following medications:  Requested Prescriptions     Pending Prescriptions Disp Refills    pantoprazole (PROTONIX) 40 MG tablet 180 tablet 3     Sig: Take 1 tablet by mouth 2 times daily (before meals)       Date of last visit: 3/19/2025  Date of next visit (if applicable):7/30/2025  Date of last refill: 8/7/24- updated pharmacy  [facility]      Thanks,  Candi Singh, JAREK

## 2025-06-16 ENCOUNTER — TELEPHONE (OUTPATIENT)
Dept: FAMILY MEDICINE CLINIC | Age: 45
End: 2025-06-16

## 2025-06-16 ENCOUNTER — OFFICE VISIT (OUTPATIENT)
Dept: FAMILY MEDICINE CLINIC | Age: 45
End: 2025-06-16
Payer: COMMERCIAL

## 2025-06-16 VITALS
WEIGHT: 222 LBS | DIASTOLIC BLOOD PRESSURE: 86 MMHG | TEMPERATURE: 98 F | SYSTOLIC BLOOD PRESSURE: 130 MMHG | RESPIRATION RATE: 18 BRPM | HEIGHT: 66 IN | HEART RATE: 79 BPM | OXYGEN SATURATION: 99 % | BODY MASS INDEX: 35.68 KG/M2

## 2025-06-16 DIAGNOSIS — L98.9 SKIN LESION OF CHEST WALL: Primary | ICD-10-CM

## 2025-06-16 DIAGNOSIS — R42 DIZZINESS: ICD-10-CM

## 2025-06-16 PROCEDURE — 99214 OFFICE O/P EST MOD 30 MIN: CPT | Performed by: NURSE PRACTITIONER

## 2025-06-16 PROCEDURE — 3079F DIAST BP 80-89 MM HG: CPT | Performed by: NURSE PRACTITIONER

## 2025-06-16 PROCEDURE — 3075F SYST BP GE 130 - 139MM HG: CPT | Performed by: NURSE PRACTITIONER

## 2025-06-16 RX ORDER — LAMOTRIGINE 100 MG/1
100 TABLET ORAL DAILY
COMMUNITY
Start: 2025-05-06

## 2025-06-16 RX ORDER — MECLIZINE HYDROCHLORIDE 25 MG/1
25 TABLET ORAL 3 TIMES DAILY PRN
Qty: 30 TABLET | Refills: 0 | Status: SHIPPED | OUTPATIENT
Start: 2025-06-16 | End: 2025-06-26

## 2025-06-16 NOTE — TELEPHONE ENCOUNTER
Patient notified   Patient states she scheduled therapy however the first available is not until July 7  What should she do in the meantime?

## 2025-06-16 NOTE — PROGRESS NOTES
[facility]  [address]  [facility] [address]  Dept: [phone]  Dept Fax: [phone]  Loc: [phone]     Visit Date:  6/16/2025    Provider: MAURICE Kiran CNP        Patient:  Hattie Mora  YOB: 1980    HPI:     Chief Complaint   Patient presents with    Dizziness     Dizzy spells for the last 4 days. Pt stated it started Friday when walking through her garden.  No nausea when this occurs.  No vision changes and pt has not had a fall. Some headaches but pt has allergies and isn't sure if it is from that.        History of Present Illness  The patient is a 45-year-old female who presents for evaluation of dizziness.    She began experiencing dizziness on Friday, which has been persistent, particularly in the mornings upon standing up. The sensation is described as a feeling of impending faintness. She reports no associated symptoms, although she does have a history of anxiety. She attempted to alleviate the dizziness with Xanax yesterday, but it only induced sleep without resolving the dizziness. The onset of her symptoms was noted while she was ambulating in her garden. She reports no tinnitus or hearing loss. She has not been experiencing headaches. She also reports pruritus in her ears but no tinnitus or hearing loss. She has discontinued her losartan hydrochlorothiazide medication as advised by Dr. Morris due to stable blood pressure readings around 120/70. She also reports palpitations, which she attributes to her anxiety, but notes no significant increase in the past four days. She has an upcoming appointment with Dr. Morris at the end of 07/2025.    She has a spot on her chest that has been present for probably 2 months.       Medications    Current Outpatient Medications:     lamoTRIgine (LAMICTAL) 100 MG tablet, Take 1 tablet by mouth daily, Disp: , Rfl:     meclizine (ANTIVERT) 25 MG

## 2025-06-16 NOTE — TELEPHONE ENCOUNTER
Spoke with patient. She had called. She has taken her meclizine twice already and it is not helping Not sure if this is normal or if she can double it up.     Please advise.

## 2025-06-17 NOTE — TELEPHONE ENCOUNTER
Notified patient of Epley maneuver and where to source information. Patient will call if she has further questions or concerns.

## 2025-07-13 DIAGNOSIS — R10.13 EPIGASTRIC PAIN: ICD-10-CM

## 2025-07-13 DIAGNOSIS — K29.00 ACUTE GASTRITIS WITHOUT HEMORRHAGE, UNSPECIFIED GASTRITIS TYPE: ICD-10-CM

## 2025-07-14 RX ORDER — FAMOTIDINE 20 MG/1
20 TABLET, FILM COATED ORAL NIGHTLY
Qty: 90 TABLET | Refills: 0 | Status: SHIPPED | OUTPATIENT
Start: 2025-07-14

## 2025-07-14 NOTE — TELEPHONE ENCOUNTER
Hattie Mora called requesting a refill on the following medications:  Requested Prescriptions     Pending Prescriptions Disp Refills    famotidine (PEPCID) 20 MG tablet [Pharmacy Med Name: Famotidine 20 MG Oral Tablet] 90 tablet 3     Sig: TAKE 1 TABLET BY MOUTH AT  BEDTIME       Date of last visit: 6/16/2025  Date of next visit (if applicable):7/30/2025  Date of last refill: 8/27/24  Pharmacy Name: Candi Gaitan LPN

## 2025-07-28 ENCOUNTER — HOSPITAL ENCOUNTER (OUTPATIENT)
Age: 45
Discharge: HOME | End: 2025-07-28
Payer: COMMERCIAL

## 2025-07-28 DIAGNOSIS — Z00.00 HEALTHCARE MAINTENANCE: ICD-10-CM

## 2025-07-28 DIAGNOSIS — E53.8 B12 DEFICIENCY: ICD-10-CM

## 2025-07-28 DIAGNOSIS — Z13.220 SCREENING, LIPID: ICD-10-CM

## 2025-07-28 DIAGNOSIS — Z13.1 SCREENING FOR DIABETES MELLITUS: ICD-10-CM

## 2025-07-28 LAB
25(OH)D3 SERPL-MCNC: 34 NG/ML (ref 30–100)
ALBUMIN SERPL BCG-MCNC: 4.4 G/DL (ref 3.4–4.9)
ALP SERPL-CCNC: 68 U/L (ref 38–126)
ALT SERPL W/O P-5'-P-CCNC: 26 U/L (ref 10–35)
ANION GAP SERPL CALC-SCNC: 12 MEQ/L (ref 8–16)
AST SERPL-CCNC: 22 U/L (ref 10–35)
BASOPHILS ABSOLUTE: 0 THOU/MM3 (ref 0–0.1)
BASOPHILS NFR BLD AUTO: 0.8 %
BILIRUB CONJ SERPL-MCNC: < 0.1 MG/DL (ref 0–0.2)
BILIRUB SERPL-MCNC: 0.4 MG/DL (ref 0.3–1.2)
BUN SERPL-MCNC: 6 MG/DL (ref 8–23)
CALCIUM SERPL-MCNC: 9.3 MG/DL (ref 8.6–10)
CHLORIDE SERPL-SCNC: 104 MEQ/L (ref 98–111)
CHOLEST SERPL-MCNC: 248 MG/DL (ref 100–199)
CO2 SERPL-SCNC: 23 MEQ/L (ref 22–29)
CREAT SERPL-MCNC: 1.1 MG/DL (ref 0.5–0.9)
DEPRECATED MEAN GLUCOSE BLD GHB EST-ACNC: 96 MG/DL (ref 70–126)
DEPRECATED RDW RBC AUTO: 35.8 FL (ref 35–45)
EOSINOPHIL NFR BLD AUTO: 1.8 %
EOSINOPHILS ABSOLUTE: 0.1 THOU/MM3 (ref 0–0.4)
ERYTHROCYTE [DISTWIDTH] IN BLOOD BY AUTOMATED COUNT: 12 % (ref 11.5–14.5)
GFR SERPL CREATININE-BSD FRML MDRD: 63 ML/MIN/1.73M2
GLUCOSE SERPL-MCNC: 112 MG/DL (ref 74–109)
HBA1C MFR BLD HPLC: 5.2 % (ref 4–6)
HCT VFR BLD AUTO: 42.1 % (ref 37–47)
HDLC SERPL-MCNC: 56 MG/DL
HGB BLD-MCNC: 13.8 GM/DL (ref 12–16)
IMM GRANULOCYTES # BLD AUTO: 0.01 THOU/MM3 (ref 0–0.07)
IMM GRANULOCYTES NFR BLD AUTO: 0.2 %
LDLC SERPL CALC-MCNC: 162 MG/DL
LYMPHOCYTES ABSOLUTE: 1.8 THOU/MM3 (ref 1–4.8)
LYMPHOCYTES NFR BLD AUTO: 36.7 %
MAGNESIUM SERPL-MCNC: 2.4 MG/DL (ref 1.6–2.6)
MCH RBC QN AUTO: 27.7 PG (ref 26–33)
MCHC RBC AUTO-ENTMCNC: 32.8 GM/DL (ref 32.2–35.5)
MCV RBC AUTO: 84.4 FL (ref 81–99)
MONOCYTES ABSOLUTE: 0.3 THOU/MM3 (ref 0.4–1.3)
MONOCYTES NFR BLD AUTO: 6.4 %
NEUTROPHILS ABSOLUTE: 2.7 THOU/MM3 (ref 1.8–7.7)
NEUTROPHILS NFR BLD AUTO: 54.1 %
NRBC BLD AUTO-RTO: 0 /100 WBC
PLATELET # BLD AUTO: 317 THOU/MM3 (ref 130–400)
PMV BLD AUTO: 9.7 FL (ref 9.4–12.4)
POTASSIUM SERPL-SCNC: 4.1 MEQ/L (ref 3.5–5.2)
PROT SERPL-MCNC: 6.9 G/DL (ref 6.4–8.3)
RBC # BLD AUTO: 4.99 MILL/MM3 (ref 4.2–5.4)
SODIUM SERPL-SCNC: 139 MEQ/L (ref 135–145)
TRIGL SERPL-MCNC: 151 MG/DL (ref 0–199)
TSH SERPL DL<=0.05 MIU/L-ACNC: 3.01 UIU/ML (ref 0.27–4.2)
VIT B12 SERPL-MCNC: 1398 PG/ML (ref 232–1245)
WBC # BLD AUTO: 4.9 THOU/MM3 (ref 4.8–10.8)

## 2025-07-28 PROCEDURE — 80053 COMPREHEN METABOLIC PANEL: CPT

## 2025-07-28 PROCEDURE — 83036 HEMOGLOBIN GLYCOSYLATED A1C: CPT

## 2025-07-28 PROCEDURE — 36415 COLL VENOUS BLD VENIPUNCTURE: CPT

## 2025-07-28 PROCEDURE — 83735 ASSAY OF MAGNESIUM: CPT

## 2025-07-28 PROCEDURE — 82306 VITAMIN D 25 HYDROXY: CPT

## 2025-07-28 PROCEDURE — 80061 LIPID PANEL: CPT

## 2025-07-28 PROCEDURE — 85025 COMPLETE CBC W/AUTO DIFF WBC: CPT

## 2025-07-28 PROCEDURE — 84443 ASSAY THYROID STIM HORMONE: CPT

## 2025-07-28 PROCEDURE — 82607 VITAMIN B-12: CPT

## 2025-07-28 PROCEDURE — 82248 BILIRUBIN DIRECT: CPT

## 2025-07-30 ENCOUNTER — OFFICE VISIT (OUTPATIENT)
Dept: FAMILY MEDICINE CLINIC | Age: 45
End: 2025-07-30
Payer: COMMERCIAL

## 2025-07-30 VITALS
RESPIRATION RATE: 18 BRPM | TEMPERATURE: 98.7 F | SYSTOLIC BLOOD PRESSURE: 124 MMHG | OXYGEN SATURATION: 98 % | HEIGHT: 66 IN | WEIGHT: 215.4 LBS | BODY MASS INDEX: 34.62 KG/M2 | DIASTOLIC BLOOD PRESSURE: 72 MMHG | HEART RATE: 89 BPM

## 2025-07-30 DIAGNOSIS — G89.29 CHRONIC LEFT SHOULDER PAIN: ICD-10-CM

## 2025-07-30 DIAGNOSIS — Z82.49 FAMILY HISTORY OF PREMATURE CORONARY HEART DISEASE: ICD-10-CM

## 2025-07-30 DIAGNOSIS — F33.41 RECURRENT MAJOR DEPRESSIVE DISORDER, IN PARTIAL REMISSION: ICD-10-CM

## 2025-07-30 DIAGNOSIS — R07.89 ATYPICAL CHEST PAIN: ICD-10-CM

## 2025-07-30 DIAGNOSIS — N95.1 PERIMENOPAUSE: Primary | ICD-10-CM

## 2025-07-30 DIAGNOSIS — K21.9 GASTROESOPHAGEAL REFLUX DISEASE WITHOUT ESOPHAGITIS: ICD-10-CM

## 2025-07-30 DIAGNOSIS — M25.512 CHRONIC LEFT SHOULDER PAIN: ICD-10-CM

## 2025-07-30 DIAGNOSIS — R92.30 DENSE BREASTS: ICD-10-CM

## 2025-07-30 DIAGNOSIS — R92.2 INCONCLUSIVE MAMMOGRAPHY DUE TO DENSE BREASTS: ICD-10-CM

## 2025-07-30 DIAGNOSIS — R92.30 INCONCLUSIVE MAMMOGRAPHY DUE TO DENSE BREASTS: ICD-10-CM

## 2025-07-30 DIAGNOSIS — F41.9 ANXIETY: ICD-10-CM

## 2025-07-30 DIAGNOSIS — R11.2 NAUSEA AND VOMITING, UNSPECIFIED VOMITING TYPE: ICD-10-CM

## 2025-07-30 PROCEDURE — 3078F DIAST BP <80 MM HG: CPT | Performed by: FAMILY MEDICINE

## 2025-07-30 PROCEDURE — 3074F SYST BP LT 130 MM HG: CPT | Performed by: FAMILY MEDICINE

## 2025-07-30 PROCEDURE — 99214 OFFICE O/P EST MOD 30 MIN: CPT | Performed by: FAMILY MEDICINE

## 2025-07-30 RX ORDER — PANTOPRAZOLE SODIUM 40 MG/1
40 TABLET, DELAYED RELEASE ORAL
Qty: 180 TABLET | Refills: 3 | Status: SHIPPED | OUTPATIENT
Start: 2025-07-30 | End: 2026-07-30

## 2025-07-30 RX ORDER — ONDANSETRON 4 MG/1
4 TABLET, ORALLY DISINTEGRATING ORAL 3 TIMES DAILY PRN
Qty: 90 TABLET | Refills: 3 | Status: SHIPPED | OUTPATIENT
Start: 2025-07-30

## 2025-07-30 RX ORDER — DESVENLAFAXINE 100 MG/1
100 TABLET, EXTENDED RELEASE ORAL DAILY
Qty: 90 TABLET | Refills: 3 | Status: SHIPPED | OUTPATIENT
Start: 2025-07-30

## 2025-07-30 NOTE — PROGRESS NOTES
07/28/2025 0.3 (L)  0.4 - 1.3 thou/mm3 Final    Eosinophils Absolute 07/28/2025 0.1  0.0 - 0.4 thou/mm3 Final    Basophils Absolute 07/28/2025 0.0  0.0 - 0.1 thou/mm3 Final    Immature Grans (Abs) 07/28/2025 0.01  0.00 - 0.07 thou/mm3 Final    nRBC 07/28/2025 0  /100 wbc Final    [place] [address]    Anion Gap 07/28/2025 12.0  8.0 - 16.0 meq/L Final    Comment: ANION GAP = Sodium -(Chloride + CO2)  [place] [address]      Est, Glom Filt Rate 07/28/2025 63  >60 ml/min/1.73m2 Final    Comment: Pediatric calculator link  https://www.kidney.org/professionals/kdoqi/gfr_calculatorped  Effective Oct 3, 2022  These results are not intended for use in patients  <18 years of age.  eGFR results are calculated without a race factor  using the 2021 CKD-EPI equation.  Careful clinical  correlation is recommended, particularly when comparing  to results calculated using previous equations. The  CKD-EPI equation is less accurate in patients with  extremes of muscle mass, extra-renal metabolism of  creatinine, excessive creatine ingestion, or following  therapy that affects renal tubular secretion.  [place] [address]         Review of Systems   Constitutional:  Negative for fatigue and fever.   Respiratory:  Negative for shortness of breath.    Cardiovascular:  Negative for chest pain and leg swelling.          Objective   Physical Exam  Constitutional:       General: She is not in acute distress.     Appearance: Normal appearance. She is not ill-appearing.   Cardiovascular:      Rate and Rhythm: Normal rate and regular rhythm.      Heart sounds: No murmur heard.  Pulmonary:      Effort: Pulmonary effort is normal. No respiratory distress.      Breath sounds: Normal breath sounds. No wheezing.   Musculoskeletal:         General: No swelling.   Neurological:      Mental Status: She is

## 2025-08-13 ENCOUNTER — OFFICE VISIT (OUTPATIENT)
Dept: CARDIOLOGY CLINIC | Age: 45
End: 2025-08-13
Payer: COMMERCIAL

## 2025-08-13 VITALS
BODY MASS INDEX: 34.78 KG/M2 | DIASTOLIC BLOOD PRESSURE: 88 MMHG | WEIGHT: 216.4 LBS | HEIGHT: 66 IN | HEART RATE: 80 BPM | SYSTOLIC BLOOD PRESSURE: 138 MMHG

## 2025-08-13 DIAGNOSIS — R00.2 PALPITATIONS: ICD-10-CM

## 2025-08-13 DIAGNOSIS — K21.9 GASTROESOPHAGEAL REFLUX DISEASE, UNSPECIFIED WHETHER ESOPHAGITIS PRESENT: ICD-10-CM

## 2025-08-13 DIAGNOSIS — R07.9 CHEST PAIN, UNSPECIFIED TYPE: Primary | ICD-10-CM

## 2025-08-13 DIAGNOSIS — R06.09 DOE (DYSPNEA ON EXERTION): ICD-10-CM

## 2025-08-13 DIAGNOSIS — M25.512 LEFT SHOULDER PAIN, UNSPECIFIED CHRONICITY: ICD-10-CM

## 2025-08-13 DIAGNOSIS — I10 PRIMARY HYPERTENSION: ICD-10-CM

## 2025-08-13 DIAGNOSIS — R94.31 ABNORMAL ELECTROCARDIOGRAPHY: ICD-10-CM

## 2025-08-13 DIAGNOSIS — E78.5 HYPERLIPIDEMIA, UNSPECIFIED HYPERLIPIDEMIA TYPE: ICD-10-CM

## 2025-08-13 DIAGNOSIS — F17.211 NICOTINE DEPENDENCE, CIGARETTES, IN REMISSION: ICD-10-CM

## 2025-08-13 PROCEDURE — 3079F DIAST BP 80-89 MM HG: CPT | Performed by: INTERNAL MEDICINE

## 2025-08-13 PROCEDURE — 3075F SYST BP GE 130 - 139MM HG: CPT | Performed by: INTERNAL MEDICINE

## 2025-08-13 PROCEDURE — 99204 OFFICE O/P NEW MOD 45 MIN: CPT | Performed by: INTERNAL MEDICINE

## 2025-08-13 PROCEDURE — 93000 ELECTROCARDIOGRAM COMPLETE: CPT | Performed by: INTERNAL MEDICINE

## 2025-08-13 RX ORDER — NITROGLYCERIN 0.4 MG/1
0.4 TABLET SUBLINGUAL EVERY 5 MIN PRN
Qty: 25 TABLET | Refills: 3 | Status: SHIPPED | OUTPATIENT
Start: 2025-08-13

## 2025-08-25 ENCOUNTER — TELEPHONE (OUTPATIENT)
Dept: CARDIOLOGY CLINIC | Age: 45
End: 2025-08-25